# Patient Record
Sex: MALE | Race: BLACK OR AFRICAN AMERICAN | NOT HISPANIC OR LATINO | URBAN - METROPOLITAN AREA
[De-identification: names, ages, dates, MRNs, and addresses within clinical notes are randomized per-mention and may not be internally consistent; named-entity substitution may affect disease eponyms.]

---

## 2019-08-23 ENCOUNTER — EMERGENCY (EMERGENCY)
Facility: HOSPITAL | Age: 49
LOS: 1 days | Discharge: HOME | End: 2019-08-23
Attending: EMERGENCY MEDICINE
Payer: MEDICAID

## 2019-08-23 VITALS
HEART RATE: 78 BPM | OXYGEN SATURATION: 99 % | TEMPERATURE: 98 F | WEIGHT: 184.97 LBS | SYSTOLIC BLOOD PRESSURE: 119 MMHG | DIASTOLIC BLOOD PRESSURE: 84 MMHG | RESPIRATION RATE: 18 BRPM | HEIGHT: 70 IN

## 2019-08-23 PROCEDURE — 99283 EMERGENCY DEPT VISIT LOW MDM: CPT

## 2019-08-23 PROCEDURE — 99282 EMERGENCY DEPT VISIT SF MDM: CPT

## 2019-08-23 NOTE — ED STATDOCS - NS ED ROS FT
ROS: no CP/SOB. no cough. no fever. (+) nausea No v/d/c. (+) abd pain. no rash. no bleeding. no urinary complaints. no weakness. no vision changes. no HA. no neck/back pain. no extremity swelling/deformity. No change in mental status.

## 2019-08-23 NOTE — ED STATDOCS - CLINICAL SUMMARY MEDICAL DECISION MAKING FREE TEXT BOX
Pt with several weeks of abd pain, cramping, intermittent. Patient is asymptomatic now. Abdominal exam unremarkable. Tolerating PO. passing flatus, Normal bowel movement yesterday. No SHx. Will recommend Motrin, Tylenol and GI followup. Pt with several weeks of abd pain, cramping, intermittent. Patient is asymptomatic now. Abdominal exam unremarkable. Tolerating PO. passing flatus, Normal bowel movement yesterday. No SHx. no concern for acute surgical or infectious process. Will recommend Motrin, Tylenol and GI followup.

## 2019-08-23 NOTE — ED STATDOCS - PHYSICAL EXAMINATION
Gen: NAD, AOx3  Head: NCAT  HEENT: PERRL, EOMI, oral mucosa moist, normal conjunctiva, neck supple  Lung: CTAB, no respiratory distress  CV: rrr, no murmur, Normal perfusion  Abd: soft, NTND, no CVA tenderness  MSK: No edema, no visible deformities  Neuro: No focal neurologic deficits  Skin: No rash   Psych: Flat affect Gen: NAD, AOx3  Head: NCAT  HEENT: EOMI, oral mucosa moist, normal conjunctiva, neck supple  Lung: CTAB, no respiratory distress  CV: rrr, no murmur, Normal perfusion  Abd: soft, NTND  MSK: No edema, no visible deformities  Neuro: No focal neurologic deficits  Skin: No rash   Psych: Flat affect

## 2019-08-23 NOTE — ED STATDOCS - NS_ ATTENDINGSCRIBEDETAILS _ED_A_ED_FT
I, Teresita De La Cruz, performed the initial face to face bedside interview with this patient regarding history of present illness, review of symptoms and relevant past medical, social and family history.  I completed an independent physical examination.  The history, relevant review of systems, past medical and surgical history, medical decision making, and physical examination was documented by the scribe in my presence and I attest to the accuracy of the documentation.

## 2019-08-23 NOTE — ED STATDOCS - OBJECTIVE STATEMENT
49 y/o M pt with no significant PMHx presents to the ED c/o intermittent abdominal pain, onset 2 weeks ago. The pain is described as pressure. Associated sx of nausea and reported constipation. He reports that last night he had a HA along with the abdominal pain, prompting him to come to the ED. Patient last had bowel movement yesterday, and states that he had to strain to pass it. Patient has not taken any medication for his pain. Patient is planning to set up appointment to see GI. Denies CP, vomiting, diarrhea, recent travel, weight loss. No further acute complaints at this time. 47 y/o M pt with no significant PMHx presents to the ED c/o intermittent abdominal pain, onset 2 weeks ago. The pain is described as pressure. Associated sx of nausea and reported constipation. He reports that last night he had a HA along with the abdominal pain, prompting him to come to the ED. Patient last had bowel movement yesterday, and states that he had to strain to pass it. Patient has not taken any medication for his pain. +flatus. Patient is planning to set up appointment to see GI. so surgical history. no fevers. Denies CP, vomiting, diarrhea, recent travel, weight loss. No further acute complaints at this time.

## 2019-08-23 NOTE — ED ADULT TRIAGE NOTE - CHIEF COMPLAINT QUOTE
"I was seen in urgent care on Saturday for abdominal pain and nausea and the nausea won't go away"  Pt is here due to the nausea not going away. "I was seen in urgent care on Saturday for abdominal pain and nausea and the nausea won't go away"  Pt is here due to the nausea not going away.  Lower abdominal pain, + constipation, denies chest pain, denies difficulty breathing.  Was prescribed meds for nausea but finished it.

## 2022-05-31 ENCOUNTER — EMERGENCY (EMERGENCY)
Facility: HOSPITAL | Age: 52
LOS: 0 days | Discharge: HOME | End: 2022-05-31
Attending: EMERGENCY MEDICINE
Payer: MEDICAID

## 2022-05-31 VITALS — HEIGHT: 70 IN

## 2022-05-31 VITALS
RESPIRATION RATE: 18 BRPM | DIASTOLIC BLOOD PRESSURE: 84 MMHG | TEMPERATURE: 99 F | SYSTOLIC BLOOD PRESSURE: 121 MMHG | HEART RATE: 79 BPM | OXYGEN SATURATION: 98 %

## 2022-05-31 DIAGNOSIS — M79.602 PAIN IN LEFT ARM: ICD-10-CM

## 2022-05-31 DIAGNOSIS — Z85.3 PERSONAL HISTORY OF MALIGNANT NEOPLASM OF BREAST: ICD-10-CM

## 2022-05-31 DIAGNOSIS — R07.89 OTHER CHEST PAIN: ICD-10-CM

## 2022-05-31 LAB
ALBUMIN SERPL ELPH-MCNC: 3.8 G/DL — SIGNIFICANT CHANGE UP (ref 3.3–5)
ALP SERPL-CCNC: 64 U/L — SIGNIFICANT CHANGE UP (ref 40–120)
ALT FLD-CCNC: 32 U/L — SIGNIFICANT CHANGE UP (ref 12–78)
ANION GAP SERPL CALC-SCNC: 4 MMOL/L — LOW (ref 5–17)
AST SERPL-CCNC: 32 U/L — SIGNIFICANT CHANGE UP (ref 15–37)
BASOPHILS # BLD AUTO: 0.03 K/UL — SIGNIFICANT CHANGE UP (ref 0–0.2)
BASOPHILS NFR BLD AUTO: 0.7 % — SIGNIFICANT CHANGE UP (ref 0–2)
BILIRUB SERPL-MCNC: 0.5 MG/DL — SIGNIFICANT CHANGE UP (ref 0.2–1.2)
BUN SERPL-MCNC: 14 MG/DL — SIGNIFICANT CHANGE UP (ref 7–23)
CALCIUM SERPL-MCNC: 9.4 MG/DL — SIGNIFICANT CHANGE UP (ref 8.5–10.1)
CHLORIDE SERPL-SCNC: 108 MMOL/L — SIGNIFICANT CHANGE UP (ref 96–108)
CO2 SERPL-SCNC: 29 MMOL/L — SIGNIFICANT CHANGE UP (ref 22–31)
CREAT SERPL-MCNC: 1.16 MG/DL — SIGNIFICANT CHANGE UP (ref 0.5–1.3)
EGFR: 76 ML/MIN/1.73M2 — SIGNIFICANT CHANGE UP
EOSINOPHIL # BLD AUTO: 0.05 K/UL — SIGNIFICANT CHANGE UP (ref 0–0.5)
EOSINOPHIL NFR BLD AUTO: 1.2 % — SIGNIFICANT CHANGE UP (ref 0–6)
GLUCOSE SERPL-MCNC: 91 MG/DL — SIGNIFICANT CHANGE UP (ref 70–99)
HCT VFR BLD CALC: 40.3 % — SIGNIFICANT CHANGE UP (ref 39–50)
HGB BLD-MCNC: 14.3 G/DL — SIGNIFICANT CHANGE UP (ref 13–17)
IMM GRANULOCYTES NFR BLD AUTO: 0.2 % — SIGNIFICANT CHANGE UP (ref 0–1.5)
LYMPHOCYTES # BLD AUTO: 1.84 K/UL — SIGNIFICANT CHANGE UP (ref 1–3.3)
LYMPHOCYTES # BLD AUTO: 42.4 % — SIGNIFICANT CHANGE UP (ref 13–44)
MCHC RBC-ENTMCNC: 28.4 PG — SIGNIFICANT CHANGE UP (ref 27–34)
MCHC RBC-ENTMCNC: 35.5 GM/DL — SIGNIFICANT CHANGE UP (ref 32–36)
MCV RBC AUTO: 80 FL — SIGNIFICANT CHANGE UP (ref 80–100)
MONOCYTES # BLD AUTO: 0.4 K/UL — SIGNIFICANT CHANGE UP (ref 0–0.9)
MONOCYTES NFR BLD AUTO: 9.2 % — SIGNIFICANT CHANGE UP (ref 2–14)
NEUTROPHILS # BLD AUTO: 2.01 K/UL — SIGNIFICANT CHANGE UP (ref 1.8–7.4)
NEUTROPHILS NFR BLD AUTO: 46.3 % — SIGNIFICANT CHANGE UP (ref 43–77)
PLATELET # BLD AUTO: 174 K/UL — SIGNIFICANT CHANGE UP (ref 150–400)
POTASSIUM SERPL-MCNC: 4.3 MMOL/L — SIGNIFICANT CHANGE UP (ref 3.5–5.3)
POTASSIUM SERPL-SCNC: 4.3 MMOL/L — SIGNIFICANT CHANGE UP (ref 3.5–5.3)
PROT SERPL-MCNC: 7.6 GM/DL — SIGNIFICANT CHANGE UP (ref 6–8.3)
RBC # BLD: 5.04 M/UL — SIGNIFICANT CHANGE UP (ref 4.2–5.8)
RBC # FLD: 13.8 % — SIGNIFICANT CHANGE UP (ref 10.3–14.5)
SODIUM SERPL-SCNC: 141 MMOL/L — SIGNIFICANT CHANGE UP (ref 135–145)
TROPONIN I, HIGH SENSITIVITY RESULT: 6.44 NG/L — SIGNIFICANT CHANGE UP
WBC # BLD: 4.34 K/UL — SIGNIFICANT CHANGE UP (ref 3.8–10.5)
WBC # FLD AUTO: 4.34 K/UL — SIGNIFICANT CHANGE UP (ref 3.8–10.5)

## 2022-05-31 PROCEDURE — 85025 COMPLETE CBC W/AUTO DIFF WBC: CPT

## 2022-05-31 PROCEDURE — 84484 ASSAY OF TROPONIN QUANT: CPT

## 2022-05-31 PROCEDURE — 71046 X-RAY EXAM CHEST 2 VIEWS: CPT

## 2022-05-31 PROCEDURE — 99284 EMERGENCY DEPT VISIT MOD MDM: CPT

## 2022-05-31 PROCEDURE — 36415 COLL VENOUS BLD VENIPUNCTURE: CPT

## 2022-05-31 PROCEDURE — 80053 COMPREHEN METABOLIC PANEL: CPT

## 2022-05-31 PROCEDURE — 99283 EMERGENCY DEPT VISIT LOW MDM: CPT | Mod: 25

## 2022-05-31 PROCEDURE — 71046 X-RAY EXAM CHEST 2 VIEWS: CPT | Mod: 26

## 2022-05-31 NOTE — ED STATDOCS - PATIENT PORTAL LINK FT
You can access the FollowMyHealth Patient Portal offered by [facility] by registering at the following website: [email or web address]. By joining [employer]’s FollowMyHealth portal, you will also be able to view your health information using other applications (apps) compatible with our system.

## 2022-05-31 NOTE — ED ADULT TRIAGE NOTE - CHIEF COMPLAINT QUOTE
pt presents to ed ambulatory for evaluation of left arm pain and chest pain - pt reports he was being evaluated by dr bridger vergara at [place] for chemo initiation and port placement for lymphoma and ekg on 5/26 had changes but unsure of what and started on lisinopril and coreg. ekg in progress

## 2022-05-31 NOTE — ED STATDOCS - PROGRESS NOTE DETAILS
signed Paty Lima PA-C Pt seen initially in intake by Dr Lee.   51M c/o left sided chest pain since last night. pt has some soreness over his left sided chest port which was placed within the past few weeks, pt being treated for lymphoma. No swelling or apparent erythema of port area. Pt had recent wokrup with his cardiologist at [place], cannot recall their name. No significant findings on labs, imaging, or EKG. UNclear cause of pts chest pain, does not seem serious at this time, recommend close outpt f/u with his cardiologist this week. return precautions given. Pt feeling well at DC, agrees with DC and plan of care.

## 2022-05-31 NOTE — ED STATDOCS - OBJECTIVE STATEMENT
50 yo male w/PMH of lymphoma and breast cancer presents to the ED for CP and left arm pain since last night. Pt reports he was evaluated by Dr. Chris Pinto at [facility] x5 days ago for chemo initiation and port placement for lymphoma and breast cancer. States he had an echocardiogram done x5 days ago as well. Denies fever/chills, cough, SOB, abdominal pain or N/V/D. Pt is on Coreg and Lisinopril.

## 2022-05-31 NOTE — ED STATDOCS - CLINICAL SUMMARY MEDICAL DECISION MAKING FREE TEXT BOX
Pt poor historian but being worked up for lymphoma at [facility] and had a port placed last week. Saw cardiologist and had echo and started on BP medications. Some tenderness at port site. Reports CP and left arm pain x1 day. Doubt ACS. EKG normal. Will check labs including 1 troponin, if unremarkable pt will need to f/u with cardiologist this week.

## 2022-05-31 NOTE — ED ADULT NURSE NOTE - OBJECTIVE STATEMENT
c/o chest pain radiating to left arm, patient has history of breast cancer, lymophoma, and medication chest porty

## 2022-05-31 NOTE — ED STATDOCS - NSFOLLOWUPINSTRUCTIONS_ED_ALL_ED_FT
FOLLOW UP WITH YOUR CARDIOLOGIST IN 1-2 DAYS. RETURN TO THE ER FOR ANY WORSENING SYMPTOMS OR NEW CONCERNS.     Chest Pain    WHAT YOU NEED TO KNOW:    Chest pain can be caused by a range of conditions, from not serious to life-threatening. Chest pain can be a symptom of a digestive problem, such as acid reflux or a stomach ulcer. An anxiety attack or a strong emotion, such as anger, can also cause chest pain. Infection, inflammation, or a fracture in the bones or cartilage in your chest can cause pain or discomfort. Sometimes chest pain or pressure is caused by poor blood flow to your heart (angina). Chest pain may also be caused by life-threatening conditions such as a heart attack or blood clot in your lungs.     DISCHARGE INSTRUCTIONS:    Call 911 if:     You have any of the following signs of a heart attack:   Squeezing, pressure, or pain in your chest       and any of the following:   Discomfort or pain in your back, neck, jaw, stomach, or arm       Shortness of breath      Nausea or vomiting      Lightheadedness or a sudden cold sweat        Return to the emergency department if:     You have chest discomfort that gets worse, even with medicine.      You cough or vomit blood.       Your bowel movements are black or bloody.       You cannot stop vomiting, or it hurts to swallow.     Contact your healthcare provider if:     You have questions or concerns about your condition or care.        Medicines:     Medicines may be given to treat the cause of your chest pain. Examples include pain medicine, anxiety medicine, or medicines to increase blood flow to your heart.       Do not take certain medicines without asking your healthcare provider first. These include NSAIDs, herbal or vitamin supplements, or hormones (estrogen or progestin).       Take your medicine as directed. Contact your healthcare provider if you think your medicine is not helping or if you have side effects. Tell him or her if you are allergic to any medicine. Keep a list of the medicines, vitamins, and herbs you take. Include the amounts, and when and why you take them. Bring the list or the pill bottles to follow-up visits. Carry your medicine list with you in case of an emergency.    Follow up with your healthcare provider within 72 hours, or as directed: You may need to return for more tests to find the cause of your chest pain. You may be referred to a specialist, such as a cardiologist or gastroenterologist. Write down your questions so you remember to ask them during your visits.     Healthy living tips: The following are general healthy guidelines. If your chest pain is caused by a heart problem, your healthcare provider will give you specific guidelines to follow.    Do not smoke. Nicotine and other chemicals in cigarettes and cigars can cause lung and heart damage. Ask your healthcare provider for information if you currently smoke and need help to quit. E-cigarettes or smokeless tobacco still contain nicotine. Talk to your healthcare provider before you use these products.       Eat a variety of healthy, low-fat, low-salt foods. Healthy foods include fruits, vegetables, whole-grain breads, low-fat dairy products, beans, lean meats, and fish. Ask for more information about a heart healthy diet.      Drink plenty of water every day. Your body is made of mostly water. Water helps your body to control your temperature and blood pressure. Ask your healthcare provider how much water you should drink every day.      Ask about activity. Your healthcare provider will tell you which activities to limit or avoid. Ask when you can drive, return to work, and have sex. Ask about the best exercise plan for you.      Maintain a healthy weight. Ask your healthcare provider how much you should weigh. Ask him or her to help you create a weight loss plan if you are overweight.       Get the flu and pneumonia vaccines. All adults should get the influenza (flu) vaccine. Get it every year as soon as it becomes available. The pneumococcal vaccine is given to adults aged 65 years or older. The vaccine is given every 5 years to prevent pneumococcal disease, such as pneumonia.    If you have a stent:     Carry your stent card with you at all times.       Let all healthcare providers know that you have a stent.

## 2022-05-31 NOTE — ED STATDOCS - NS ED ATTENDING STATEMENT MOD
This was a shared visit with the JOYCE. I reviewed and verified the documentation and independently performed the documented:

## 2022-05-31 NOTE — ED STATDOCS - ATTENDING APP SHARED VISIT CONTRIBUTION OF CARE
I,Rustam Lee MD,  performed the initial face to face bedside interview with this patient regarding history of present illness, review of symptoms and relevant past medical, social and family history.  I completed an independent physical examination.  I was the initial provider who evaluated this patient. I have signed out the follow up of any pending tests (i.e. labs, radiological studies) to the ACP.  I have communicated the patient’s plan of care and disposition with the ACP.  The history, relevant review of systems, past medical and surgical history, medical decision making, and physical examination was documented by the scribe in my presence and I attest to the accuracy of the documentation.

## 2022-05-31 NOTE — ED ADULT NURSE NOTE - CHIEF COMPLAINT QUOTE
pt presents to ed ambulatory for evaluation of left arm pain and chest pain - pt reports he was being evaluated by dr bridger vergara at [place] for chemo initiation and port placement for lymphoma and ekg on 5/26 had changes but unsure of what and started on lisinopril and coreg. ekg in progress

## 2022-07-21 ENCOUNTER — EMERGENCY (EMERGENCY)
Facility: HOSPITAL | Age: 52
LOS: 1 days | Discharge: HOME | End: 2022-07-21
Attending: EMERGENCY MEDICINE
Payer: COMMERCIAL

## 2022-07-21 VITALS
OXYGEN SATURATION: 100 % | DIASTOLIC BLOOD PRESSURE: 69 MMHG | TEMPERATURE: 99 F | WEIGHT: 177.91 LBS | SYSTOLIC BLOOD PRESSURE: 104 MMHG | RESPIRATION RATE: 18 BRPM | HEART RATE: 76 BPM | HEIGHT: 70 IN

## 2022-07-21 LAB
ALBUMIN SERPL ELPH-MCNC: 3.8 G/DL — SIGNIFICANT CHANGE UP (ref 3.3–5.2)
ALP SERPL-CCNC: 55 U/L — SIGNIFICANT CHANGE UP (ref 40–120)
ALT FLD-CCNC: 34 U/L — SIGNIFICANT CHANGE UP
ANION GAP SERPL CALC-SCNC: 10 MMOL/L — SIGNIFICANT CHANGE UP (ref 5–17)
ANISOCYTOSIS BLD QL: SLIGHT — SIGNIFICANT CHANGE UP
AST SERPL-CCNC: 30 U/L — SIGNIFICANT CHANGE UP
BASOPHILS # BLD AUTO: 0.17 K/UL — SIGNIFICANT CHANGE UP (ref 0–0.2)
BASOPHILS NFR BLD AUTO: 1.8 % — SIGNIFICANT CHANGE UP (ref 0–2)
BILIRUB SERPL-MCNC: 0.2 MG/DL — LOW (ref 0.4–2)
BUN SERPL-MCNC: 9.9 MG/DL — SIGNIFICANT CHANGE UP (ref 8–20)
CALCIUM SERPL-MCNC: 8.8 MG/DL — SIGNIFICANT CHANGE UP (ref 8.6–10.2)
CHLORIDE SERPL-SCNC: 103 MMOL/L — SIGNIFICANT CHANGE UP (ref 98–107)
CO2 SERPL-SCNC: 26 MMOL/L — SIGNIFICANT CHANGE UP (ref 22–29)
CREAT SERPL-MCNC: 1.03 MG/DL — SIGNIFICANT CHANGE UP (ref 0.5–1.3)
EGFR: 87 ML/MIN/1.73M2 — SIGNIFICANT CHANGE UP
EOSINOPHIL # BLD AUTO: 0 K/UL — SIGNIFICANT CHANGE UP (ref 0–0.5)
EOSINOPHIL NFR BLD AUTO: 0 % — SIGNIFICANT CHANGE UP (ref 0–6)
GIANT PLATELETS BLD QL SMEAR: PRESENT — SIGNIFICANT CHANGE UP
GLUCOSE SERPL-MCNC: 93 MG/DL — SIGNIFICANT CHANGE UP (ref 70–99)
HCT VFR BLD CALC: 27.9 % — LOW (ref 39–50)
HGB BLD-MCNC: 10 G/DL — LOW (ref 13–17)
LIDOCAIN IGE QN: 30 U/L — SIGNIFICANT CHANGE UP (ref 22–51)
LYMPHOCYTES # BLD AUTO: 2.37 K/UL — SIGNIFICANT CHANGE UP (ref 1–3.3)
LYMPHOCYTES # BLD AUTO: 25.6 % — SIGNIFICANT CHANGE UP (ref 13–44)
MACROCYTES BLD QL: SLIGHT — SIGNIFICANT CHANGE UP
MANUAL SMEAR VERIFICATION: SIGNIFICANT CHANGE UP
MCHC RBC-ENTMCNC: 28.2 PG — SIGNIFICANT CHANGE UP (ref 27–34)
MCHC RBC-ENTMCNC: 35.8 GM/DL — SIGNIFICANT CHANGE UP (ref 32–36)
MCV RBC AUTO: 78.8 FL — LOW (ref 80–100)
METAMYELOCYTES # FLD: 0.9 % — HIGH (ref 0–0)
MONOCYTES # BLD AUTO: 1.31 K/UL — HIGH (ref 0–0.9)
MONOCYTES NFR BLD AUTO: 14.1 % — HIGH (ref 2–14)
MYELOCYTES NFR BLD: 0.9 % — HIGH (ref 0–0)
NEUTROPHILS # BLD AUTO: 5.08 K/UL — SIGNIFICANT CHANGE UP (ref 1.8–7.4)
NEUTROPHILS NFR BLD AUTO: 54.9 % — SIGNIFICANT CHANGE UP (ref 43–77)
OVALOCYTES BLD QL SMEAR: SLIGHT — SIGNIFICANT CHANGE UP
PLAT MORPH BLD: NORMAL — SIGNIFICANT CHANGE UP
PLATELET # BLD AUTO: 311 K/UL — SIGNIFICANT CHANGE UP (ref 150–400)
POIKILOCYTOSIS BLD QL AUTO: SLIGHT — SIGNIFICANT CHANGE UP
POLYCHROMASIA BLD QL SMEAR: SLIGHT — SIGNIFICANT CHANGE UP
POTASSIUM SERPL-MCNC: 4.1 MMOL/L — SIGNIFICANT CHANGE UP (ref 3.5–5.3)
POTASSIUM SERPL-SCNC: 4.1 MMOL/L — SIGNIFICANT CHANGE UP (ref 3.5–5.3)
PROMYELOCYTES # FLD: 1.8 % — HIGH (ref 0–0)
PROT SERPL-MCNC: 6.2 G/DL — LOW (ref 6.6–8.7)
RBC # BLD: 3.54 M/UL — LOW (ref 4.2–5.8)
RBC # FLD: 15.4 % — HIGH (ref 10.3–14.5)
RBC BLD AUTO: ABNORMAL
SMUDGE CELLS # BLD: PRESENT — SIGNIFICANT CHANGE UP
SODIUM SERPL-SCNC: 139 MMOL/L — SIGNIFICANT CHANGE UP (ref 135–145)
TARGETS BLD QL SMEAR: SLIGHT — SIGNIFICANT CHANGE UP
TROPONIN T SERPL-MCNC: <0.01 NG/ML — SIGNIFICANT CHANGE UP (ref 0–0.06)
WBC # BLD: 9.26 K/UL — SIGNIFICANT CHANGE UP (ref 3.8–10.5)
WBC # FLD AUTO: 9.26 K/UL — SIGNIFICANT CHANGE UP (ref 3.8–10.5)

## 2022-07-21 PROCEDURE — 93010 ELECTROCARDIOGRAM REPORT: CPT

## 2022-07-21 PROCEDURE — 99285 EMERGENCY DEPT VISIT HI MDM: CPT

## 2022-07-21 RX ORDER — QUETIAPINE FUMARATE 200 MG/1
200 TABLET, FILM COATED ORAL ONCE
Refills: 0 | Status: COMPLETED | OUTPATIENT
Start: 2022-07-21 | End: 2022-07-21

## 2022-07-21 RX ADMIN — QUETIAPINE FUMARATE 200 MILLIGRAM(S): 200 TABLET, FILM COATED ORAL at 21:41

## 2022-07-21 NOTE — ED PROVIDER NOTE - OBJECTIVE STATEMENT
50 yo male with hx of breast cancer and active lymphoma on chemo presents to the ED for diarrhea. Patient states that he has had diarrhea for two weeks assc with abdominal pain in the center of abdomen. Denies N/V. Last chemo treatment 11 days ago. Tolerating PO, no abdominal surgeries in the past.

## 2022-07-21 NOTE — ED PROVIDER NOTE - ATTENDING CONTRIBUTION TO CARE
I, Rustam Aguilera, performed a face to face bedside interview with this patient regarding history of present illness, review of symptoms and relevant past medical, social and family history.  I completed an independent physical examination. I have communicated the patient’s plan of care and disposition with the resident.  51 year old male with PMh lymphoma on chemo presents with 2 weeks of diarrhea. pt reports waterry, non-bloody diarrhea x 2 weeks. No fevers, vomiting, dysuria.  Gen: NAD, well appearing  CV: RRR  Pul: CTA b/l  Abd: Soft, non-distended, non-tender  Neuro: no focal deficits  Pt improved, abd soft and non-surgical, stable for dc

## 2022-07-21 NOTE — ED STATDOCS - PROGRESS NOTE DETAILS
51y male with a PMHx of breast CA, lymphoma -- chemo and radiation treatments x1/week presents to the ED c/o diarrhea, nausea, dizziness with associated abdominal pain onset x2 weeks. Pt reports telling his doctor about it when he was planned to receive treatment, but he was not given treatment for his cancer due to lower white count. Sent to [facility] for further evaluation.

## 2022-07-21 NOTE — ED STATDOCS - NS_ ATTENDINGSCRIBEDETAILS _ED_A_ED_FT
I, Meir Venegas, performed the initial face to face bedside interview with this patient regarding history of present illness, review of symptoms and relevant past medical, social and family history.  I completed an independent physical examination.   The history, relevant review of systems, past medical and surgical history, medical decision making, and physical examination was documented by the scribe in my presence and I attest to the accuracy of the documentation.

## 2022-07-21 NOTE — ED PROVIDER NOTE - CLINICAL SUMMARY MEDICAL DECISION MAKING FREE TEXT BOX
Patient with diarrhea for 2 weeks. Vitally stable. Well appearing. Complicated cancer hx on chemo. Will ct abdomen/pel, labs.

## 2022-07-21 NOTE — ED PROVIDER NOTE - PATIENT PORTAL LINK FT
You can access the FollowMyHealth Patient Portal offered by [facility] by registering at the following website: [email or web address]. By joining [employer]’s FollowMyHealth portal, you will also be able to view your health information using other applications (apps) compatible with our system.

## 2022-07-21 NOTE — ED PROVIDER NOTE - PROGRESS NOTE DETAILS
labs, CT, urine reviewed. stable on reassessment. pending stool PCR. will dc with pcp follow up. -DO Julio César

## 2022-07-21 NOTE — ED PROVIDER NOTE - NSFOLLOWUPINSTRUCTIONS_ED_ALL_ED_FT
Diarrhea    Diarrhea is frequent loose or watery bowel movements that has many causes. Diarrhea can make you feel weak and cause you to become dehydrated. Diarrhea typically lasts 2–3 days, but can last longer if it is a sign of something more serious. Drink clear fluids to prevent dehydration. Eat bland, easy-to-digest foods as tolerated.     SEEK IMMEDIATE MEDICAL CARE IF YOU HAVE ANY OF THE FOLLOWING SYMPTOMS: high fevers, lightheadedness/dizziness, chest pain, black or bloody stools, shortness of breath, severe abdominal or back pain, or any signs of dehydration.    - Please follow-up with your primary care doctor.  Please call for an appointment in the next 1-3 days but if you cannot follow-up with your primary care doctor please return to the ED for any urgent issues.  - You were given a copy of the tests performed today.  Please bring the results with you and review them with your primary care doctor.  - If you have any worsening of symptoms or any other concerns please return to the ED immediately.  - Please continue taking your home medications as directed.

## 2022-07-22 VITALS
HEART RATE: 84 BPM | DIASTOLIC BLOOD PRESSURE: 77 MMHG | RESPIRATION RATE: 20 BRPM | SYSTOLIC BLOOD PRESSURE: 123 MMHG | OXYGEN SATURATION: 99 %

## 2022-07-22 LAB
APPEARANCE UR: CLEAR — SIGNIFICANT CHANGE UP
BACTERIA # UR AUTO: ABNORMAL
BILIRUB UR-MCNC: NEGATIVE — SIGNIFICANT CHANGE UP
COLOR SPEC: YELLOW — SIGNIFICANT CHANGE UP
CULTURE RESULTS: SIGNIFICANT CHANGE UP
DIFF PNL FLD: NEGATIVE — SIGNIFICANT CHANGE UP
EPI CELLS # UR: SIGNIFICANT CHANGE UP
GLUCOSE UR QL: NEGATIVE MG/DL — SIGNIFICANT CHANGE UP
KETONES UR-MCNC: NEGATIVE — SIGNIFICANT CHANGE UP
LEUKOCYTE ESTERASE UR-ACNC: NEGATIVE — SIGNIFICANT CHANGE UP
NITRITE UR-MCNC: NEGATIVE — SIGNIFICANT CHANGE UP
PH UR: 7 — SIGNIFICANT CHANGE UP (ref 5–8)
PROT UR-MCNC: NEGATIVE — SIGNIFICANT CHANGE UP
RBC CASTS # UR COMP ASSIST: SIGNIFICANT CHANGE UP /HPF (ref 0–4)
SP GR SPEC: 1 — LOW (ref 1.01–1.02)
SPECIMEN SOURCE: SIGNIFICANT CHANGE UP
UROBILINOGEN FLD QL: NEGATIVE MG/DL — SIGNIFICANT CHANGE UP
WBC UR QL: SIGNIFICANT CHANGE UP /HPF (ref 0–5)

## 2022-07-22 PROCEDURE — 87046 STOOL CULTR AEROBIC BACT EA: CPT

## 2022-07-22 PROCEDURE — 74177 CT ABD & PELVIS W/CONTRAST: CPT | Mod: ME

## 2022-07-22 PROCEDURE — 80053 COMPREHEN METABOLIC PANEL: CPT

## 2022-07-22 PROCEDURE — 87045 FECES CULTURE AEROBIC BACT: CPT

## 2022-07-22 PROCEDURE — 84484 ASSAY OF TROPONIN QUANT: CPT

## 2022-07-22 PROCEDURE — G1004: CPT

## 2022-07-22 PROCEDURE — 36415 COLL VENOUS BLD VENIPUNCTURE: CPT

## 2022-07-22 PROCEDURE — 81001 URINALYSIS AUTO W/SCOPE: CPT

## 2022-07-22 PROCEDURE — 93005 ELECTROCARDIOGRAM TRACING: CPT

## 2022-07-22 PROCEDURE — 87507 IADNA-DNA/RNA PROBE TQ 12-25: CPT

## 2022-07-22 PROCEDURE — 83690 ASSAY OF LIPASE: CPT

## 2022-07-22 PROCEDURE — 74177 CT ABD & PELVIS W/CONTRAST: CPT | Mod: 26,ME

## 2022-07-22 PROCEDURE — 85025 COMPLETE CBC W/AUTO DIFF WBC: CPT

## 2022-07-22 PROCEDURE — 99285 EMERGENCY DEPT VISIT HI MDM: CPT | Mod: 25

## 2022-07-22 PROCEDURE — 87077 CULTURE AEROBIC IDENTIFY: CPT

## 2022-07-22 NOTE — ED ADULT NURSE REASSESSMENT NOTE - NS ED NURSE REASSESS COMMENT FT1
pt resting comfortably showing no signs of respiratory distress or pain, pt is calm and cooperative, awaiting discharge
Assumed care of pt at 19:15 as stated in report from MARYBETH Olson. Charting as noted. Patient A&O x4, denies pain/discomfort, denies CP/SOB. Updated on the plan of care. Call bell within reach, bed locked in lowest position. IV site flushed w/ NS. No redness, swelling or pain noted to site. No signs of acute distress noted, safety maintained.
pt resting comfortably showing no signs of respiratory distress or pain, pt is calm and cooperative

## 2022-07-25 LAB
CULTURE RESULTS: SIGNIFICANT CHANGE UP
SPECIMEN SOURCE: SIGNIFICANT CHANGE UP

## 2022-08-14 ENCOUNTER — EMERGENCY (EMERGENCY)
Facility: HOSPITAL | Age: 52
LOS: 1 days | Discharge: HOME | End: 2022-08-14
Attending: EMERGENCY MEDICINE
Payer: COMMERCIAL

## 2022-08-14 VITALS
RESPIRATION RATE: 17 BRPM | DIASTOLIC BLOOD PRESSURE: 67 MMHG | OXYGEN SATURATION: 98 % | SYSTOLIC BLOOD PRESSURE: 107 MMHG | HEART RATE: 94 BPM

## 2022-08-14 VITALS
OXYGEN SATURATION: 97 % | DIASTOLIC BLOOD PRESSURE: 66 MMHG | HEART RATE: 116 BPM | HEIGHT: 70 IN | TEMPERATURE: 98 F | RESPIRATION RATE: 16 BRPM | SYSTOLIC BLOOD PRESSURE: 98 MMHG | WEIGHT: 160.06 LBS

## 2022-08-14 PROCEDURE — 93010 ELECTROCARDIOGRAM REPORT: CPT

## 2022-08-14 PROCEDURE — 93005 ELECTROCARDIOGRAM TRACING: CPT

## 2022-08-14 PROCEDURE — 99284 EMERGENCY DEPT VISIT MOD MDM: CPT

## 2022-08-14 PROCEDURE — 99283 EMERGENCY DEPT VISIT LOW MDM: CPT

## 2022-08-14 NOTE — ED PROVIDER NOTE - CLINICAL SUMMARY MEDICAL DECISION MAKING FREE TEXT BOX
51YOM w/ PMH of Lymphoma , breast cancer, HTN, currently on chemotherapy tx, complaining of palpitations occurring at 12am today 8/14/22.    CXR, EKG, Troponin Levels ordered to r/o cardiopulmonary abnormalities, Pulm edema, ACS/MI  CBC/BMP ordered to r/o anemia, infection, electrolyte abnormalities pt ekg wnl, no acute complaints at this time, discussed need to be compliant with his medications.

## 2022-08-14 NOTE — ED ADULT NURSE REASSESSMENT NOTE - NS ED NURSE REASSESS COMMENT FT1
Pt dc, dc instructions provided to pt, verbalized understanding. Pt oob left amb with steady gait. VSS

## 2022-08-14 NOTE — ED PROVIDER NOTE - NS ED ROS FT
- CONSTITUTIONAL: Denies, fever or chills.    - HEENT: Denies changes in vision or hearing.    - RESPIRATORY: Denies SOB or cough.    - CV: Denies palpitations or CP.    - GI: Denies abdominal pain,. Endorses nausea & diarrhea at baseline 2/2 to chemo.    - : Denies dysuria, hematuria.    - MSK: Denies myalgia and joint pain.    - SKIN: Denies rash and pruritus.    - NEUROLOGICAL: Denies headache or dizziness

## 2022-08-14 NOTE — ED ADULT TRIAGE NOTE - CHIEF COMPLAINT QUOTE
Ambulatory to ED c/o palpitations beginning this PM. Patient states he missed one dose of daily Lisinopril and carvedilol yesterday. Patient states "I feel my heartbeat in my ear". Patient denies chest pain/discomfort or SOB at triage. EKG in progress.

## 2022-08-14 NOTE — ED ADULT NURSE NOTE - DISCHARGE DATE/TIME
09/04/21 1325   SLP Deferred Reason   SLP Deferred Reason Routine  (Consult received for dysphagia eval. Pt currently NPO for GI. Will eval when cleared for PO.)      14-Aug-2022 03:20

## 2022-08-14 NOTE — ED PROVIDER NOTE - ATTENDING CONTRIBUTION TO CARE
I personally saw the patient with the student, and completed the key components of the history and physical exam. I then discussed the management plan with the student. I personally evaluated the patient with the medical student. I completed my own independent history, physical exam, and assessment. I have independently reviewed all relevant test results. I have reviewed and agree with the notes as documented by the student.

## 2022-08-14 NOTE — ED PROVIDER NOTE - OBJECTIVE STATEMENT
51YOM w/ PMH of Lymphoma , breast cancer, HTN, currently on chemotherapy tx, complaining of palpitations occurring at 12am today 8/14/22.    Pt states he was lying in bed at midnight when he felt palpitations in his chest lasting for roughly 1 hr. Pt denies sensation of CP, SOB, Chest tightness. Pt states he has not taken his carvedilol or lisinopril medication for 2 days, and recently skipped one dose of Seroquel yesterday Pt endorses baseline nausea & diarrhea 2/2 chemo treatment. Pt denies HA, dizziness, fevers, chills, congestion, cough, dysuria, hematuria, or hematochezia.

## 2022-08-14 NOTE — ED PROVIDER NOTE - PHYSICAL EXAMINATION
- GENERAL: Alert and oriented x 3. No acute distress. Well-nourished.    - EYES: EOMI. Anicteric.    - HENT: NCAT. Moist mucous membranes. No scleral icterus. No cervical lymphadenopathy.    - LUNGS: Clear to auscultation bilaterally. No accessory muscle use.    - CARDIOVASCULAR: Regular rate and rhythm. No murmur. No JVD.    - ABDOMEN: Soft, non-tender and non-distended. No palpable masses.    - EXTREMITIES: No edema. Non-tender.?SKIN: No rashes or lesions. Chemo port site clean and intact, no signs of infection.    - NEUROLOGIC: No focal neurological deficits. CN II-XII grossly intact, but not individually tested.    - PSYCHIATRIC: Cooperative. Appropriate mood and affect.

## 2022-08-14 NOTE — ED PROVIDER NOTE - PATIENT PORTAL LINK FT
You can access the FollowMyHealth Patient Portal offered by [facility] by registering at the following website: [email or web address]. By joining [employer]’s FollowMyHealth portal, you will also be able to view your health information using other applications (apps) compatible with our system.

## 2022-08-24 NOTE — ED STATDOCS - CHPI ED QUALITY
PRESSURE Peng Advancement Flap Text: The defect edges were debeveled with a #15 scalpel blade.  Given the location of the defect, shape of the defect and the proximity to free margins a Peng advancement flap was deemed most appropriate.  Using a sterile surgical marker, an appropriate advancement flap was drawn incorporating the defect and placing the expected incisions within the relaxed skin tension lines where possible. The area thus outlined was incised deep to adipose tissue with a #15 scalpel blade.  The skin margins were undermined to an appropriate distance in all directions utilizing iris scissors.

## 2022-08-25 ENCOUNTER — EMERGENCY (EMERGENCY)
Facility: HOSPITAL | Age: 52
LOS: 1 days | Discharge: HOME | End: 2022-08-25
Attending: EMERGENCY MEDICINE
Payer: COMMERCIAL

## 2022-08-25 VITALS
OXYGEN SATURATION: 98 % | DIASTOLIC BLOOD PRESSURE: 80 MMHG | RESPIRATION RATE: 18 BRPM | TEMPERATURE: 98 F | SYSTOLIC BLOOD PRESSURE: 122 MMHG | HEART RATE: 80 BPM

## 2022-08-25 VITALS
DIASTOLIC BLOOD PRESSURE: 81 MMHG | HEART RATE: 72 BPM | WEIGHT: 184.97 LBS | SYSTOLIC BLOOD PRESSURE: 121 MMHG | RESPIRATION RATE: 20 BRPM | HEIGHT: 70 IN | OXYGEN SATURATION: 93 % | TEMPERATURE: 99 F

## 2022-08-25 PROBLEM — C50.919 MALIGNANT NEOPLASM OF UNSPECIFIED SITE OF UNSPECIFIED FEMALE BREAST: Chronic | Status: ACTIVE | Noted: 2022-08-14

## 2022-08-25 PROBLEM — C85.90 NON-HODGKIN LYMPHOMA, UNSPECIFIED, UNSPECIFIED SITE: Chronic | Status: ACTIVE | Noted: 2022-08-14

## 2022-08-25 LAB
ALBUMIN SERPL ELPH-MCNC: 3.9 G/DL — SIGNIFICANT CHANGE UP (ref 3.3–5.2)
ALP SERPL-CCNC: 68 U/L — SIGNIFICANT CHANGE UP (ref 40–120)
ALT FLD-CCNC: 20 U/L — SIGNIFICANT CHANGE UP
ANION GAP SERPL CALC-SCNC: 10 MMOL/L — SIGNIFICANT CHANGE UP (ref 5–17)
ANION GAP SERPL CALC-SCNC: 10 MMOL/L — SIGNIFICANT CHANGE UP (ref 5–17)
ANION GAP SERPL CALC-SCNC: 9 MMOL/L — SIGNIFICANT CHANGE UP (ref 5–17)
APPEARANCE UR: CLEAR — SIGNIFICANT CHANGE UP
APTT BLD: 23.7 SEC — LOW (ref 27.5–35.5)
AST SERPL-CCNC: 24 U/L — SIGNIFICANT CHANGE UP
BACTERIA # UR AUTO: ABNORMAL
BASOPHILS # BLD AUTO: 0.01 K/UL — SIGNIFICANT CHANGE UP (ref 0–0.2)
BASOPHILS NFR BLD AUTO: 0.3 % — SIGNIFICANT CHANGE UP (ref 0–2)
BILIRUB SERPL-MCNC: 1.4 MG/DL — SIGNIFICANT CHANGE UP (ref 0.4–2)
BILIRUB UR-MCNC: NEGATIVE — SIGNIFICANT CHANGE UP
BUN SERPL-MCNC: 19.1 MG/DL — SIGNIFICANT CHANGE UP (ref 8–20)
BUN SERPL-MCNC: 19.5 MG/DL — SIGNIFICANT CHANGE UP (ref 8–20)
BUN SERPL-MCNC: 20.1 MG/DL — HIGH (ref 8–20)
CALCIUM SERPL-MCNC: 8.5 MG/DL — SIGNIFICANT CHANGE UP (ref 8.4–10.5)
CALCIUM SERPL-MCNC: 8.7 MG/DL — SIGNIFICANT CHANGE UP (ref 8.4–10.5)
CALCIUM SERPL-MCNC: 8.9 MG/DL — SIGNIFICANT CHANGE UP (ref 8.4–10.5)
CHLORIDE SERPL-SCNC: 93 MMOL/L — LOW (ref 98–107)
CHLORIDE SERPL-SCNC: 96 MMOL/L — LOW (ref 98–107)
CHLORIDE SERPL-SCNC: 99 MMOL/L — SIGNIFICANT CHANGE UP (ref 98–107)
CO2 SERPL-SCNC: 23 MMOL/L — SIGNIFICANT CHANGE UP (ref 22–29)
CO2 SERPL-SCNC: 24 MMOL/L — SIGNIFICANT CHANGE UP (ref 22–29)
CO2 SERPL-SCNC: 25 MMOL/L — SIGNIFICANT CHANGE UP (ref 22–29)
COLOR SPEC: YELLOW — SIGNIFICANT CHANGE UP
CREAT ?TM UR-MCNC: 92 MG/DL — SIGNIFICANT CHANGE UP
CREAT SERPL-MCNC: 1.03 MG/DL — SIGNIFICANT CHANGE UP (ref 0.5–1.3)
CREAT SERPL-MCNC: 1.11 MG/DL — SIGNIFICANT CHANGE UP (ref 0.5–1.3)
CREAT SERPL-MCNC: 1.2 MG/DL — SIGNIFICANT CHANGE UP (ref 0.5–1.3)
D DIMER BLD IA.RAPID-MCNC: 226 NG/ML DDU — SIGNIFICANT CHANGE UP
DIFF PNL FLD: NEGATIVE — SIGNIFICANT CHANGE UP
EGFR: 73 ML/MIN/1.73M2 — SIGNIFICANT CHANGE UP
EGFR: 80 ML/MIN/1.73M2 — SIGNIFICANT CHANGE UP
EGFR: 87 ML/MIN/1.73M2 — SIGNIFICANT CHANGE UP
EOSINOPHIL # BLD AUTO: 0 K/UL — SIGNIFICANT CHANGE UP (ref 0–0.5)
EOSINOPHIL NFR BLD AUTO: 0 % — SIGNIFICANT CHANGE UP (ref 0–6)
EPI CELLS # UR: SIGNIFICANT CHANGE UP
GLUCOSE SERPL-MCNC: 88 MG/DL — SIGNIFICANT CHANGE UP (ref 70–99)
GLUCOSE SERPL-MCNC: 94 MG/DL — SIGNIFICANT CHANGE UP (ref 70–99)
GLUCOSE SERPL-MCNC: 94 MG/DL — SIGNIFICANT CHANGE UP (ref 70–99)
GLUCOSE UR QL: NEGATIVE MG/DL — SIGNIFICANT CHANGE UP
HCT VFR BLD CALC: 25.7 % — LOW (ref 39–50)
HGB BLD-MCNC: 9.3 G/DL — LOW (ref 13–17)
IMM GRANULOCYTES NFR BLD AUTO: 0.3 % — SIGNIFICANT CHANGE UP (ref 0–1.5)
INR BLD: 1.16 RATIO — SIGNIFICANT CHANGE UP (ref 0.88–1.16)
KETONES UR-MCNC: ABNORMAL
LEUKOCYTE ESTERASE UR-ACNC: NEGATIVE — SIGNIFICANT CHANGE UP
LYMPHOCYTES # BLD AUTO: 1.23 K/UL — SIGNIFICANT CHANGE UP (ref 1–3.3)
LYMPHOCYTES # BLD AUTO: 31.1 % — SIGNIFICANT CHANGE UP (ref 13–44)
MAGNESIUM SERPL-MCNC: 2.2 MG/DL — SIGNIFICANT CHANGE UP (ref 1.6–2.6)
MCHC RBC-ENTMCNC: 29.1 PG — SIGNIFICANT CHANGE UP (ref 27–34)
MCHC RBC-ENTMCNC: 36.2 GM/DL — HIGH (ref 32–36)
MCV RBC AUTO: 80.3 FL — SIGNIFICANT CHANGE UP (ref 80–100)
MONOCYTES # BLD AUTO: 0.13 K/UL — SIGNIFICANT CHANGE UP (ref 0–0.9)
MONOCYTES NFR BLD AUTO: 3.3 % — SIGNIFICANT CHANGE UP (ref 2–14)
NEUTROPHILS # BLD AUTO: 2.57 K/UL — SIGNIFICANT CHANGE UP (ref 1.8–7.4)
NEUTROPHILS NFR BLD AUTO: 65 % — SIGNIFICANT CHANGE UP (ref 43–77)
NITRITE UR-MCNC: NEGATIVE — SIGNIFICANT CHANGE UP
OSMOLALITY SERPL: 282 MOSMOL/KG — SIGNIFICANT CHANGE UP (ref 275–300)
OSMOLALITY UR: 470 MOSM/KG — SIGNIFICANT CHANGE UP (ref 300–1000)
PH UR: 8 — SIGNIFICANT CHANGE UP (ref 5–8)
PLATELET # BLD AUTO: 247 K/UL — SIGNIFICANT CHANGE UP (ref 150–400)
POTASSIUM SERPL-MCNC: 4.3 MMOL/L — SIGNIFICANT CHANGE UP (ref 3.5–5.3)
POTASSIUM SERPL-SCNC: 4.3 MMOL/L — SIGNIFICANT CHANGE UP (ref 3.5–5.3)
PROT SERPL-MCNC: 6.7 G/DL — SIGNIFICANT CHANGE UP (ref 6.6–8.7)
PROT UR-MCNC: NEGATIVE — SIGNIFICANT CHANGE UP
PROTHROM AB SERPL-ACNC: 13.5 SEC — HIGH (ref 10.5–13.4)
RBC # BLD: 3.2 M/UL — LOW (ref 4.2–5.8)
RBC # FLD: 17.2 % — HIGH (ref 10.3–14.5)
RBC CASTS # UR COMP ASSIST: SIGNIFICANT CHANGE UP /HPF (ref 0–4)
SODIUM SERPL-SCNC: 128 MMOL/L — LOW (ref 135–145)
SODIUM SERPL-SCNC: 128 MMOL/L — LOW (ref 135–145)
SODIUM SERPL-SCNC: 132 MMOL/L — LOW (ref 135–145)
SODIUM UR-SCNC: 84 MMOL/L — SIGNIFICANT CHANGE UP
SP GR SPEC: 1.01 — SIGNIFICANT CHANGE UP (ref 1.01–1.02)
TROPONIN T SERPL-MCNC: <0.01 NG/ML — SIGNIFICANT CHANGE UP (ref 0–0.06)
TROPONIN T SERPL-MCNC: <0.01 NG/ML — SIGNIFICANT CHANGE UP (ref 0–0.06)
UROBILINOGEN FLD QL: NEGATIVE MG/DL — SIGNIFICANT CHANGE UP
WBC # BLD: 3.95 K/UL — SIGNIFICANT CHANGE UP (ref 3.8–10.5)
WBC # FLD AUTO: 3.95 K/UL — SIGNIFICANT CHANGE UP (ref 3.8–10.5)
WBC UR QL: SIGNIFICANT CHANGE UP /HPF (ref 0–5)

## 2022-08-25 PROCEDURE — 84436 ASSAY OF TOTAL THYROXINE: CPT

## 2022-08-25 PROCEDURE — 93005 ELECTROCARDIOGRAM TRACING: CPT

## 2022-08-25 PROCEDURE — 83735 ASSAY OF MAGNESIUM: CPT

## 2022-08-25 PROCEDURE — 85730 THROMBOPLASTIN TIME PARTIAL: CPT

## 2022-08-25 PROCEDURE — 96361 HYDRATE IV INFUSION ADD-ON: CPT

## 2022-08-25 PROCEDURE — 71046 X-RAY EXAM CHEST 2 VIEWS: CPT | Mod: 26

## 2022-08-25 PROCEDURE — 83930 ASSAY OF BLOOD OSMOLALITY: CPT

## 2022-08-25 PROCEDURE — 81001 URINALYSIS AUTO W/SCOPE: CPT

## 2022-08-25 PROCEDURE — 80048 BASIC METABOLIC PNL TOTAL CA: CPT

## 2022-08-25 PROCEDURE — 85025 COMPLETE CBC W/AUTO DIFF WBC: CPT

## 2022-08-25 PROCEDURE — 84540 ASSAY OF URINE/UREA-N: CPT

## 2022-08-25 PROCEDURE — 80053 COMPREHEN METABOLIC PANEL: CPT

## 2022-08-25 PROCEDURE — 99285 EMERGENCY DEPT VISIT HI MDM: CPT

## 2022-08-25 PROCEDURE — 84480 ASSAY TRIIODOTHYRONINE (T3): CPT

## 2022-08-25 PROCEDURE — 85610 PROTHROMBIN TIME: CPT

## 2022-08-25 PROCEDURE — 84300 ASSAY OF URINE SODIUM: CPT

## 2022-08-25 PROCEDURE — 84443 ASSAY THYROID STIM HORMONE: CPT

## 2022-08-25 PROCEDURE — 93010 ELECTROCARDIOGRAM REPORT: CPT

## 2022-08-25 PROCEDURE — 36415 COLL VENOUS BLD VENIPUNCTURE: CPT

## 2022-08-25 PROCEDURE — 84484 ASSAY OF TROPONIN QUANT: CPT

## 2022-08-25 PROCEDURE — 96360 HYDRATION IV INFUSION INIT: CPT

## 2022-08-25 PROCEDURE — 99285 EMERGENCY DEPT VISIT HI MDM: CPT | Mod: 25

## 2022-08-25 PROCEDURE — 71046 X-RAY EXAM CHEST 2 VIEWS: CPT

## 2022-08-25 PROCEDURE — 82570 ASSAY OF URINE CREATININE: CPT

## 2022-08-25 PROCEDURE — 85379 FIBRIN DEGRADATION QUANT: CPT

## 2022-08-25 PROCEDURE — 83935 ASSAY OF URINE OSMOLALITY: CPT

## 2022-08-25 RX ORDER — ACETAMINOPHEN 500 MG
650 TABLET ORAL ONCE
Refills: 0 | Status: COMPLETED | OUTPATIENT
Start: 2022-08-25 | End: 2022-08-25

## 2022-08-25 RX ORDER — QUETIAPINE FUMARATE 200 MG/1
200 TABLET, FILM COATED ORAL ONCE
Refills: 0 | Status: COMPLETED | OUTPATIENT
Start: 2022-08-25 | End: 2022-08-25

## 2022-08-25 RX ORDER — BUPROPION HYDROCHLORIDE 150 MG/1
150 TABLET, EXTENDED RELEASE ORAL DAILY
Refills: 0 | Status: DISCONTINUED | OUTPATIENT
Start: 2022-08-25 | End: 2022-08-30

## 2022-08-25 RX ORDER — SODIUM CHLORIDE 9 MG/ML
500 INJECTION INTRAMUSCULAR; INTRAVENOUS; SUBCUTANEOUS ONCE
Refills: 0 | Status: COMPLETED | OUTPATIENT
Start: 2022-08-25 | End: 2022-08-25

## 2022-08-25 RX ORDER — SODIUM CHLORIDE 9 MG/ML
1000 INJECTION, SOLUTION INTRAVENOUS ONCE
Refills: 0 | Status: COMPLETED | OUTPATIENT
Start: 2022-08-25 | End: 2022-08-25

## 2022-08-25 RX ORDER — CARVEDILOL PHOSPHATE 80 MG/1
12.5 CAPSULE, EXTENDED RELEASE ORAL EVERY 12 HOURS
Refills: 0 | Status: DISCONTINUED | OUTPATIENT
Start: 2022-08-25 | End: 2022-08-30

## 2022-08-25 RX ADMIN — SODIUM CHLORIDE 1000 MILLILITER(S): 9 INJECTION, SOLUTION INTRAVENOUS at 18:57

## 2022-08-25 RX ADMIN — SODIUM CHLORIDE 500 MILLILITER(S): 9 INJECTION INTRAMUSCULAR; INTRAVENOUS; SUBCUTANEOUS at 17:49

## 2022-08-25 RX ADMIN — CARVEDILOL PHOSPHATE 12.5 MILLIGRAM(S): 80 CAPSULE, EXTENDED RELEASE ORAL at 20:18

## 2022-08-25 RX ADMIN — BUPROPION HYDROCHLORIDE 150 MILLIGRAM(S): 150 TABLET, EXTENDED RELEASE ORAL at 20:18

## 2022-08-25 RX ADMIN — QUETIAPINE FUMARATE 200 MILLIGRAM(S): 200 TABLET, FILM COATED ORAL at 20:18

## 2022-08-25 RX ADMIN — Medication 650 MILLIGRAM(S): at 14:31

## 2022-08-25 NOTE — ED PROVIDER NOTE - CLINICAL SUMMARY MEDICAL DECISION MAKING FREE TEXT BOX
51YOM w/ PMH of lymphoma, breast cancer, HTN & HLD presents to [facility] ED c/o acute chest pain and ongoing intermittent palpitations and SOB.     - CXR, EKG, D-Dimer, Troponin ordered to r/o ACS/MI, PE, Pneumonia, Cardiopulmonary abnormalities  - CBC, CMP ordered to r/o anemia, signs of infection and/or electrolyte abnormalities.

## 2022-08-25 NOTE — ED PROVIDER NOTE - ATTENDING CONTRIBUTION TO CARE
I have personally performed a face to face medical and diagnostic evaluation of the patient. I have discussed with and reviewed the student's note and agree with the History, ROS, Physical Exam and MDM unless otherwise indicated. A brief summary of my personal evaluation and impression can be found below.    52yo man on chemo for possible breast cancer vs lymphoma, HTN, HLD presents with chest pain, "fogginess" and SOB x few weeks with worsening symptoms over the past few days. Pt states that he has been having some diarrhea after chemo. No n/v. No lightheadedness. No fever, cough, leg swelling or pain. No abdominal pain. No dysuria.    On exam, initial vitals were reviewed.  Pt is well-appearing in no acute distress. NC/AT, clear eyes, MMM, supple neck, RRR, pulses intact in all extremities, no LE edema, clear lungs bilaterally, no hypoxia or increased work of breathing, abdomen soft, nontender, nondistended, no obvious joint deformities, pt is awake and alert and moving all extremities without difficulty, no rash noted.     Low suspicion for ACS or PE. Blood work including trop and d-dimer were sent, EKG without ischemic changes, XR. Hyponatremia noted on blood work. Given fluids and blood work was rechecked.

## 2022-08-25 NOTE — ED PROVIDER NOTE - PHYSICAL EXAMINATION
- GENERAL: Alert and oriented x 3. No acute distress. Well-nourished.    - EYES: EOMI. Anicteric.    - HENT: NCAT. Moist mucous membranes. No scleral icterus. No cervical lymphadenopathy.    - LUNGS: Clear to auscultation bilaterally. No accessory muscle use.    - CARDIOVASCULAR: Regular rate and rhythm. No murmur. No JVD. Tender to palpation of the LUQ near port site.     - ABDOMEN: Soft, non-tender and non-distended. No palpable masses.    - EXTREMITIES: No edema. Non-tender.?SKIN: No rashes or lesions. Warm. Port site appears intact w/ no erythema, swelling or warmth.     - NEUROLOGIC: No focal neurological deficits. CN II-XII grossly intact, but not individually tested.    - PSYCHIATRIC: Cooperative. Appropriate mood and affect.

## 2022-08-25 NOTE — ED PROVIDER NOTE - NS ED ROS FT
- CONSTITUTIONAL: Denies fever or chills.    - HEENT: Denies changes in vision or hearing.    - RESPIRATORY: Endorses SOB for several weeks.     - CV: Endorses CP, palpitations.     - GI: Denies new abdominal pain, nausea or vomiting. Endorses diarrhea for several weeks.     - : Denies dysuria or hematuria.    - MSK: Denies new myalgias or joint pains.    - NEUROLOGICAL: Pt reports intermittent LH & headaches at baseline.

## 2022-08-25 NOTE — ED PROVIDER NOTE - OBJECTIVE STATEMENT
51YOM w/ PMH of lymphoma, breast cancer, HTN & HLD presents to [facility] ED c/o chest pain. Pt states starting this AM he began having intermittent chest pain in the left upper chest. Pt states this morning in early AM began noticing intermittent c/p in the left upper quadrant of the chest. Pt states pain was non radiating. In addition pt states he has had some intermittent palpitations and SOB today, but this has been ongoing for several weeks and has not worsened/changed. Pt endorses diarrhea for several weeks most likely 2/2 to chemo. Additionally pt reports increasing confusion over past several weeks/months. Pt denies fevers, chills, n/v, dysuria, hematuria. 51YOM w/ PMH of lymphoma, breast cancer, HTN & HLD presents to [facility] ED c/o chest pain.     Pt states starting this AM he began having intermittent chest pain in the left upper chest. Pt states this morning in early AM began noticing intermittent c/p in the left upper quadrant of the chest. Pt states pain was non radiating. In addition pt states he has had some intermittent palpitations and SOB today, but this has been ongoing for several weeks and has not worsened/changed. Pt endorses diarrhea for several weeks most likely 2/2 to chemo. Pt also reports sensation of increased confusion over past several weeks/months. Pt denies fevers, chills, n/v, dysuria, hematuria.

## 2022-08-25 NOTE — ED ADULT NURSE NOTE - OBJECTIVE STATEMENT
Assumed care of patient in a16L. Pt a&ox4 rr even and unlabored with pmhx of lymphona, breast ca, htn currently in chemotherapy (last 8/15 as per pt) presents to ed with intermittent chest pain, denies chest pain upon RN assessment. Pt denies headache, changes in vision, fevers, chills, cough, gu/gi symptoms. pt educated on plan of care, pt able to successfully teach back plan of care to RN, RN will continue to reeducate pt during hospital stay.

## 2022-08-25 NOTE — ED PROVIDER NOTE - PATIENT PORTAL LINK FT
You can access the FollowMyHealth Patient Portal offered by [facility] by registering at the following website: [email or web address]. By joining [facility]’s FollowMyHealth portal, you will also be able to view your health information using other applications (apps) compatible with our system.

## 2022-08-26 LAB — UUN UR-MCNC: 695 MG/DL — SIGNIFICANT CHANGE UP

## 2022-09-02 ENCOUNTER — INPATIENT (INPATIENT)
Facility: HOSPITAL | Age: 52
LOS: 2 days | Discharge: HOME | DRG: 641 | End: 2022-09-05
Attending: HOSPITALIST | Admitting: STUDENT IN AN ORGANIZED HEALTH CARE EDUCATION/TRAINING PROGRAM
Payer: COMMERCIAL

## 2022-09-02 VITALS
SYSTOLIC BLOOD PRESSURE: 128 MMHG | WEIGHT: 199.96 LBS | HEART RATE: 91 BPM | RESPIRATION RATE: 20 BRPM | DIASTOLIC BLOOD PRESSURE: 83 MMHG | TEMPERATURE: 98 F | HEIGHT: 70 IN | OXYGEN SATURATION: 99 %

## 2022-09-02 DIAGNOSIS — R41.82 ALTERED MENTAL STATUS, UNSPECIFIED: ICD-10-CM

## 2022-09-02 LAB
ALBUMIN SERPL ELPH-MCNC: 3.5 G/DL — SIGNIFICANT CHANGE UP (ref 3.3–5.2)
ALP SERPL-CCNC: 58 U/L — SIGNIFICANT CHANGE UP (ref 40–120)
ALT FLD-CCNC: 29 U/L — SIGNIFICANT CHANGE UP
AMPHET UR-MCNC: NEGATIVE — SIGNIFICANT CHANGE UP
ANION GAP SERPL CALC-SCNC: 16 MMOL/L — SIGNIFICANT CHANGE UP (ref 5–17)
ANION GAP SERPL CALC-SCNC: 18 MMOL/L — HIGH (ref 5–17)
ANISOCYTOSIS BLD QL: SLIGHT — SIGNIFICANT CHANGE UP
APPEARANCE UR: CLEAR — SIGNIFICANT CHANGE UP
APTT BLD: 28.1 SEC — SIGNIFICANT CHANGE UP (ref 27.5–35.5)
AST SERPL-CCNC: 32 U/L — SIGNIFICANT CHANGE UP
BARBITURATES UR SCN-MCNC: NEGATIVE — SIGNIFICANT CHANGE UP
BASOPHILS # BLD AUTO: 0 K/UL — SIGNIFICANT CHANGE UP (ref 0–0.2)
BASOPHILS NFR BLD AUTO: 0 % — SIGNIFICANT CHANGE UP (ref 0–2)
BENZODIAZ UR-MCNC: NEGATIVE — SIGNIFICANT CHANGE UP
BILIRUB SERPL-MCNC: 0.5 MG/DL — SIGNIFICANT CHANGE UP (ref 0.4–2)
BILIRUB UR-MCNC: NEGATIVE — SIGNIFICANT CHANGE UP
BUN SERPL-MCNC: 7.9 MG/DL — LOW (ref 8–20)
BUN SERPL-MCNC: 8.1 MG/DL — SIGNIFICANT CHANGE UP (ref 8–20)
CALCIUM SERPL-MCNC: 7.7 MG/DL — LOW (ref 8.4–10.5)
CALCIUM SERPL-MCNC: 8.8 MG/DL — SIGNIFICANT CHANGE UP (ref 8.4–10.5)
CHLORIDE SERPL-SCNC: 100 MMOL/L — SIGNIFICANT CHANGE UP (ref 98–107)
CHLORIDE SERPL-SCNC: 107 MMOL/L — SIGNIFICANT CHANGE UP (ref 98–107)
CO2 SERPL-SCNC: 17 MMOL/L — LOW (ref 22–29)
CO2 SERPL-SCNC: 20 MMOL/L — LOW (ref 22–29)
COCAINE METAB.OTHER UR-MCNC: NEGATIVE — SIGNIFICANT CHANGE UP
COLOR SPEC: YELLOW — SIGNIFICANT CHANGE UP
CREAT SERPL-MCNC: 0.97 MG/DL — SIGNIFICANT CHANGE UP (ref 0.5–1.3)
CREAT SERPL-MCNC: 0.97 MG/DL — SIGNIFICANT CHANGE UP (ref 0.5–1.3)
DIFF PNL FLD: NEGATIVE — SIGNIFICANT CHANGE UP
EGFR: 94 ML/MIN/1.73M2 — SIGNIFICANT CHANGE UP
EGFR: 94 ML/MIN/1.73M2 — SIGNIFICANT CHANGE UP
EOSINOPHIL # BLD AUTO: 0 K/UL — SIGNIFICANT CHANGE UP (ref 0–0.5)
EOSINOPHIL NFR BLD AUTO: 0 % — SIGNIFICANT CHANGE UP (ref 0–6)
FLUAV AG NPH QL: SIGNIFICANT CHANGE UP
FLUBV AG NPH QL: SIGNIFICANT CHANGE UP
GLUCOSE SERPL-MCNC: 81 MG/DL — SIGNIFICANT CHANGE UP (ref 70–99)
GLUCOSE SERPL-MCNC: 87 MG/DL — SIGNIFICANT CHANGE UP (ref 70–99)
GLUCOSE UR QL: NEGATIVE MG/DL — SIGNIFICANT CHANGE UP
HCT VFR BLD CALC: 22.2 % — LOW (ref 39–50)
HGB BLD-MCNC: 8.2 G/DL — LOW (ref 13–17)
INR BLD: 1.27 RATIO — HIGH (ref 0.88–1.16)
KETONES UR-MCNC: ABNORMAL
LACTATE BLDV-MCNC: 1.1 MMOL/L — SIGNIFICANT CHANGE UP (ref 0.5–2)
LEUKOCYTE ESTERASE UR-ACNC: NEGATIVE — SIGNIFICANT CHANGE UP
LYMPHOCYTES # BLD AUTO: 1.11 K/UL — SIGNIFICANT CHANGE UP (ref 1–3.3)
LYMPHOCYTES # BLD AUTO: 24.2 % — SIGNIFICANT CHANGE UP (ref 13–44)
MACROCYTES BLD QL: SLIGHT — SIGNIFICANT CHANGE UP
MANUAL SMEAR VERIFICATION: SIGNIFICANT CHANGE UP
MCHC RBC-ENTMCNC: 30 PG — SIGNIFICANT CHANGE UP (ref 27–34)
MCHC RBC-ENTMCNC: 36.9 GM/DL — HIGH (ref 32–36)
MCV RBC AUTO: 81.3 FL — SIGNIFICANT CHANGE UP (ref 80–100)
METAMYELOCYTES # FLD: 4 % — HIGH (ref 0–0)
METHADONE UR-MCNC: NEGATIVE — SIGNIFICANT CHANGE UP
MONOCYTES # BLD AUTO: 0.56 K/UL — SIGNIFICANT CHANGE UP (ref 0–0.9)
MONOCYTES NFR BLD AUTO: 12.1 % — SIGNIFICANT CHANGE UP (ref 2–14)
MYELOCYTES NFR BLD: 0.8 % — HIGH (ref 0–0)
NEUTROPHILS # BLD AUTO: 2.41 K/UL — SIGNIFICANT CHANGE UP (ref 1.8–7.4)
NEUTROPHILS NFR BLD AUTO: 51.6 % — SIGNIFICANT CHANGE UP (ref 43–77)
NEUTS BAND # BLD: 0.8 % — SIGNIFICANT CHANGE UP (ref 0–8)
NITRITE UR-MCNC: NEGATIVE — SIGNIFICANT CHANGE UP
OPIATES UR-MCNC: NEGATIVE — SIGNIFICANT CHANGE UP
OVALOCYTES BLD QL SMEAR: SLIGHT — SIGNIFICANT CHANGE UP
PCP SPEC-MCNC: SIGNIFICANT CHANGE UP
PCP UR-MCNC: NEGATIVE — SIGNIFICANT CHANGE UP
PH UR: 6.5 — SIGNIFICANT CHANGE UP (ref 5–8)
PLAT MORPH BLD: NORMAL — SIGNIFICANT CHANGE UP
PLATELET # BLD AUTO: 397 K/UL — SIGNIFICANT CHANGE UP (ref 150–400)
POLYCHROMASIA BLD QL SMEAR: SLIGHT — SIGNIFICANT CHANGE UP
POTASSIUM SERPL-MCNC: 2.6 MMOL/L — CRITICAL LOW (ref 3.5–5.3)
POTASSIUM SERPL-MCNC: 3.2 MMOL/L — LOW (ref 3.5–5.3)
POTASSIUM SERPL-SCNC: 2.6 MMOL/L — CRITICAL LOW (ref 3.5–5.3)
POTASSIUM SERPL-SCNC: 3.2 MMOL/L — LOW (ref 3.5–5.3)
PROMYELOCYTES # FLD: 1.6 % — HIGH (ref 0–0)
PROT SERPL-MCNC: 6.2 G/DL — LOW (ref 6.6–8.7)
PROT UR-MCNC: NEGATIVE — SIGNIFICANT CHANGE UP
PROTHROM AB SERPL-ACNC: 14.8 SEC — HIGH (ref 10.5–13.4)
RBC # BLD: 2.73 M/UL — LOW (ref 4.2–5.8)
RBC # FLD: 17.4 % — HIGH (ref 10.3–14.5)
RBC BLD AUTO: ABNORMAL
RSV RNA NPH QL NAA+NON-PROBE: SIGNIFICANT CHANGE UP
SARS-COV-2 RNA SPEC QL NAA+PROBE: SIGNIFICANT CHANGE UP
SMUDGE CELLS # BLD: PRESENT — SIGNIFICANT CHANGE UP
SODIUM SERPL-SCNC: 137 MMOL/L — SIGNIFICANT CHANGE UP (ref 135–145)
SODIUM SERPL-SCNC: 140 MMOL/L — SIGNIFICANT CHANGE UP (ref 135–145)
SP GR SPEC: 1.01 — SIGNIFICANT CHANGE UP (ref 1.01–1.02)
TARGETS BLD QL SMEAR: SIGNIFICANT CHANGE UP
THC UR QL: NEGATIVE — SIGNIFICANT CHANGE UP
UROBILINOGEN FLD QL: NEGATIVE MG/DL — SIGNIFICANT CHANGE UP
VARIANT LYMPHS # BLD: 4.9 % — SIGNIFICANT CHANGE UP (ref 0–6)
WBC # BLD: 4.6 K/UL — SIGNIFICANT CHANGE UP (ref 3.8–10.5)
WBC # FLD AUTO: 4.6 K/UL — SIGNIFICANT CHANGE UP (ref 3.8–10.5)

## 2022-09-02 PROCEDURE — 99223 1ST HOSP IP/OBS HIGH 75: CPT

## 2022-09-02 PROCEDURE — 99285 EMERGENCY DEPT VISIT HI MDM: CPT

## 2022-09-02 PROCEDURE — 70450 CT HEAD/BRAIN W/O DYE: CPT | Mod: 26,MG

## 2022-09-02 PROCEDURE — G1004: CPT

## 2022-09-02 PROCEDURE — 71045 X-RAY EXAM CHEST 1 VIEW: CPT | Mod: 26

## 2022-09-02 RX ORDER — QUETIAPINE FUMARATE 200 MG/1
200 TABLET, FILM COATED ORAL
Refills: 0 | Status: DISCONTINUED | OUTPATIENT
Start: 2022-09-02 | End: 2022-09-05

## 2022-09-02 RX ORDER — ONDANSETRON 8 MG/1
8 TABLET, FILM COATED ORAL EVERY 8 HOURS
Refills: 0 | Status: DISCONTINUED | OUTPATIENT
Start: 2022-09-02 | End: 2022-09-05

## 2022-09-02 RX ORDER — SODIUM CHLORIDE 9 MG/ML
1000 INJECTION INTRAMUSCULAR; INTRAVENOUS; SUBCUTANEOUS
Refills: 0 | Status: DISCONTINUED | OUTPATIENT
Start: 2022-09-02 | End: 2022-09-04

## 2022-09-02 RX ORDER — ENOXAPARIN SODIUM 100 MG/ML
40 INJECTION SUBCUTANEOUS EVERY 24 HOURS
Refills: 0 | Status: DISCONTINUED | OUTPATIENT
Start: 2022-09-02 | End: 2022-09-05

## 2022-09-02 RX ORDER — SODIUM CHLORIDE 9 MG/ML
2300 INJECTION INTRAMUSCULAR; INTRAVENOUS; SUBCUTANEOUS ONCE
Refills: 0 | Status: COMPLETED | OUTPATIENT
Start: 2022-09-02 | End: 2022-09-02

## 2022-09-02 RX ORDER — ACETAMINOPHEN 500 MG
650 TABLET ORAL EVERY 6 HOURS
Refills: 0 | Status: DISCONTINUED | OUTPATIENT
Start: 2022-09-02 | End: 2022-09-05

## 2022-09-02 RX ORDER — PANTOPRAZOLE SODIUM 20 MG/1
40 TABLET, DELAYED RELEASE ORAL
Refills: 0 | Status: DISCONTINUED | OUTPATIENT
Start: 2022-09-02 | End: 2022-09-05

## 2022-09-02 RX ORDER — LISINOPRIL 2.5 MG/1
10 TABLET ORAL DAILY
Refills: 0 | Status: DISCONTINUED | OUTPATIENT
Start: 2022-09-02 | End: 2022-09-05

## 2022-09-02 RX ORDER — QUETIAPINE FUMARATE 200 MG/1
200 TABLET, FILM COATED ORAL
Refills: 0 | Status: DISCONTINUED | OUTPATIENT
Start: 2022-09-02 | End: 2022-09-02

## 2022-09-02 RX ORDER — BUPROPION HYDROCHLORIDE 150 MG/1
1 TABLET, EXTENDED RELEASE ORAL
Qty: 0 | Refills: 0 | DISCHARGE

## 2022-09-02 RX ORDER — ONDANSETRON 8 MG/1
8 TABLET, FILM COATED ORAL THREE TIMES A DAY
Refills: 0 | Status: DISCONTINUED | OUTPATIENT
Start: 2022-09-02 | End: 2022-09-02

## 2022-09-02 RX ORDER — QUETIAPINE FUMARATE 200 MG/1
1 TABLET, FILM COATED ORAL
Qty: 0 | Refills: 0 | DISCHARGE

## 2022-09-02 RX ORDER — BUPROPION HYDROCHLORIDE 150 MG/1
200 TABLET, EXTENDED RELEASE ORAL DAILY
Refills: 0 | Status: DISCONTINUED | OUTPATIENT
Start: 2022-09-02 | End: 2022-09-02

## 2022-09-02 RX ORDER — POTASSIUM CHLORIDE 20 MEQ
40 PACKET (EA) ORAL EVERY 4 HOURS
Refills: 0 | Status: COMPLETED | OUTPATIENT
Start: 2022-09-02 | End: 2022-09-03

## 2022-09-02 RX ORDER — BUPROPION HYDROCHLORIDE 150 MG/1
200 TABLET, EXTENDED RELEASE ORAL DAILY
Refills: 0 | Status: DISCONTINUED | OUTPATIENT
Start: 2022-09-02 | End: 2022-09-05

## 2022-09-02 RX ORDER — LANOLIN ALCOHOL/MO/W.PET/CERES
3 CREAM (GRAM) TOPICAL AT BEDTIME
Refills: 0 | Status: DISCONTINUED | OUTPATIENT
Start: 2022-09-02 | End: 2022-09-05

## 2022-09-02 RX ORDER — POTASSIUM CHLORIDE 20 MEQ
10 PACKET (EA) ORAL
Refills: 0 | Status: COMPLETED | OUTPATIENT
Start: 2022-09-02 | End: 2022-09-02

## 2022-09-02 RX ADMIN — SODIUM CHLORIDE 2300 MILLILITER(S): 9 INJECTION INTRAMUSCULAR; INTRAVENOUS; SUBCUTANEOUS at 14:53

## 2022-09-02 RX ADMIN — Medication 100 MILLIEQUIVALENT(S): at 17:11

## 2022-09-02 RX ADMIN — Medication 100 MILLIEQUIVALENT(S): at 16:01

## 2022-09-02 RX ADMIN — ENOXAPARIN SODIUM 40 MILLIGRAM(S): 100 INJECTION SUBCUTANEOUS at 21:11

## 2022-09-02 RX ADMIN — Medication 100 MILLIEQUIVALENT(S): at 18:25

## 2022-09-02 RX ADMIN — SODIUM CHLORIDE 100 MILLILITER(S): 9 INJECTION INTRAMUSCULAR; INTRAVENOUS; SUBCUTANEOUS at 21:10

## 2022-09-02 RX ADMIN — Medication 650 MILLIGRAM(S): at 22:31

## 2022-09-02 RX ADMIN — Medication 40 MILLIEQUIVALENT(S): at 21:11

## 2022-09-02 RX ADMIN — Medication 650 MILLIGRAM(S): at 21:31

## 2022-09-02 NOTE — ED ADULT NURSE NOTE - CHIEF COMPLAINT QUOTE
BIBA from home - as per EMS pt currently getting chemo for breast CA and suddenly  around 8 am stopped speaking . EMS stattes pt  was seen for the same in [place] for the same recenltly and was told symptoms were related to dehydration. Pt very slow in responding - not providing any info- fallows commends

## 2022-09-02 NOTE — ED PROVIDER NOTE - PROGRESS NOTE DETAILS
Aimee: pt wife arrived states pt has been like this intermittently ove rpast few weeks. she has seen him take his psych meds. non focal. not speaking/cooperative. possible psych component but given lymphoma likely needs mri/further neuro w/u prior to psych assessment. admitted to medicine.

## 2022-09-02 NOTE — H&P ADULT - NSHPPHYSICALEXAM_GEN_ALL_CORE
Vital Signs Last 24 Hrs  T(F): 98 (02 Sep 2022 13:04), Max: 98 (02 Sep 2022 13:04)  HR: 91 (02 Sep 2022 13:04) (91 - 91)  BP: 128/83 (02 Sep 2022 13:04) (128/83 - 128/83)  RR: 20 (02 Sep 2022 13:04) (20 - 20)  SpO2: 99% (02 Sep 2022 13:04) (99% - 99%)    Physical Exam:  Constitutional: Middle aged male, non-cooperative, inconsistently answering questions with blank stare  HEENT: NC/AT  Respiratory: CTA bilaterally, symmetrical chest rise  Cardiovascular: RRR, no m/g/r  Gastrointestinal: Soft, NT/ND, BS+  Vascular: 2+ peripheral pulses  Neurological: Awake, non-cooperative with exam. Moving extremities.   Psych: Blank stare  Musculoskeletal: No edema, cyanosis, deformities. ROM normal  Skin: No obvious rash, lesions. No jaundice.

## 2022-09-02 NOTE — ED PROVIDER NOTE - OBJECTIVE STATEMENT
50 y/o male hx breast cancer/lymphoma on chemo at [place], major depression/psych on seroquel/fluphenazine p/w intermittent change in mental status over past 2 weeks. Pt not providing much history, wife reports pt has had episodes where doesn't eat drink/refuses to take meds, doesn't talk. Last one was sunday-monday, went to [facility] for 24 hours was told dehydrdation and resolved. Similar to today. States started a little bit last night but worse thing morning. pt follows some commands and occsasionally will speak in sentences but mostly staring ahead and not answring questions.     limited ros by historian.

## 2022-09-02 NOTE — ED PROVIDER NOTE - PHYSICAL EXAMINATION
Gen: No acute distress, non toxic  HEENT:  pink conjunctivae, EOMI  CV: RRR, nl s1/s2.  Resp: CTAB, normal rate and effort  GI: Abdomen soft, NT, ND. No rebound, no guarding  : No CVAT  Neuro: moves all extremities, non focal. speaks softly in full sentences but doesn't answer questions directly.   MSK: No spine or joint tenderness to palpation  Skin: No rashes. intact and perfused.

## 2022-09-02 NOTE — ED ADULT NURSE NOTE - OBJECTIVE STATEMENT
Patient presented to ED complaining of weakness, and change in mental status x2weeks. PMHX of Breast CA on chemo with mediport. patient being treated at [facility]. Patient has had episodes of decrease eating and drinking. Refuses to Take meds. Patient quet refusing to anser questions at time. Wife at bedside. No distress noted at this time.

## 2022-09-02 NOTE — ED PROVIDER NOTE - CLINICAL SUMMARY MEDICAL DECISION MAKING FREE TEXT BOX
intermittent episodes of not eating/speaking/cooperating. non focal neuro though not speaking/cooperating but pt did state "I had an iv in this arm" pointing to right arm and was articulate when speaking. will check labs, treat hydration. afebrile and no localizing symptoms, possible psych component, will reassess.

## 2022-09-02 NOTE — ED ADULT NURSE NOTE - NSIMPLEMENTINTERV_GEN_ALL_ED
Implemented All Fall Risk Interventions:  [place] to call system. Call bell, personal items and telephone within reach. Instruct patient to call for assistance. Room bathroom lighting operational. Non-slip footwear when patient is off stretcher. Physically safe environment: no spills, clutter or unnecessary equipment. Stretcher in lowest position, wheels locked, appropriate side rails in place. Provide visual cue, wrist band, yellow gown, etc. Monitor gait and stability. Monitor for mental status changes and reorient to person, place, and time. Review medications for side effects contributing to fall risk. Reinforce activity limits and safety measures with patient and family.

## 2022-09-02 NOTE — H&P ADULT - ASSESSMENT
Hypokalemia, AGMA  - In setting of chemo/dehydration  - K: 2.6, s/p KCl IV x 3, will order additional 40mEq x 2  - S/p 2.3 L bolus, start maintenace IVFs.  - Monitor BMP, correct electrolytes as needed    AMS  - Possibly due to underlying Psychiatric condition vs neurological event  - CTH negative  - Neurology consult  - Psych consult  - Fall precautions  - U/a, Urine toxicology,  - Check Ammonia, ETOH, TSH    HTN  - Continue Lisinopril    GERD  - Continue PPI    Schizophrenia, Depression  - Per med rec, patient is on Bupropion, Seroquel and Fluphenazine.  - Psych consulted    Hx of R breast CA/Lymphoma  - Follows with Dr. Pinto at [facility]    DVT ppx: Lovenox Patient is a 50 yo M w/ PMH of Breast CA, Lymphoma (follows at [facility] w/ Dr. Pinto), Major Depression/Psych Disorder, HTN, GERD presenting with chief complaint of AMS.    Hypokalemia, AGMA  - In setting of chemo/dehydration  - K: 2.6, s/p KCl IV x 3, will order additional 40mEq x 2  - S/p 2.3 L bolus, start maintenance IVFs.  - Monitor BMP, correct electrolytes as needed    AMS  - Possibly due to underlying Psychiatric condition vs neurological event  - CTH negative  - Neurology consulted for AM  - Psych consulted  - Fall precautions  - U/a, Urine toxicology,  - Check Ammonia, ETOH, TSH    HTN  - Continue Lisinopril    GERD  - Continue PPI    Schizophrenia, Depression  - Per med rec, patient is on Bupropion, Seroquel and Fluphenazine.  - Psych consulted    Hx of R breast CA/Lymphoma  - Follows with Dr. Pinto at [facility]    DVT ppx: Lovenox Patient is a 52 yo M w/ PMH of Breast CA, Lymphoma (follows at [facility] w/ Dr. Pinto), Major Depression/Psych Disorder, HTN, GERD presenting with chief complaint of AMS.    Hypokalemia, AGMA  - In setting of chemo/dehydration  - K: 2.6, s/p KCl IV x 3, will order additional 40mEq x 2  - S/p 2.3 L bolus, start maintenance IVFs.  - Monitor BMP, correct electrolytes as needed    AMS  - Possibly due to underlying Psychiatric condition vs neurological event  - CTH negative  - Neurology consulted for AM  - Psych consulted  - Fall precautions  - U/a, Urine toxicology, Ammonia, ETOH, TSH    Hx of Depression and underlying Psych Disorder  - Per wife, patient just has history of Depression however med rec lists Bupropion, Seroquel, and Fluphenazine IM.  - Psych consulted    Anemia  - Anemia panel ordered  - Monitor     HTN  - Continue Lisinopril    GERD  - Continue PPI    Hx of R breast CA/Lymphoma  - Follows with Dr. Pinto at [facility]  - Last chemo last week per patient    DVT ppx: Lovenox

## 2022-09-02 NOTE — H&P ADULT - HISTORY OF PRESENT ILLNESS
Patient is a 52 yo M w/ PMH of Breast CA, Lymphoma (follows at [facility] w/ Dr. Pinto), Major Depression/Psych Disorder, HTN, GERD presenting with chief complaint of AMS. Originally on assessment of patient, patient opened eyes and stared blankly at myself. He mumbled some words and asked to see my badge, then refused to cooperate with questions or exam. Shortly after, his wife Court arrived to bedside and patient became somewhat more cooperative with short inconsistent answers. Per wife, patient initially became "out of it" last Thursday. He had a blank stare and refused to eat or drink. He was in the [facility] ED on Saturday and was told he had a "viral infection" and was discharged. He returned to his baseline however similar symptoms occured this morning. She attempted to take him to [facility] where he normally follows but he refused to get in the car, therefore she called EMS. He has a history of Breast CA and Lymphoma and receives chemotherapy every week, last received last week.

## 2022-09-02 NOTE — ED ADULT TRIAGE NOTE - CHIEF COMPLAINT QUOTE
BIBA from home - as per EMS pt currently getting chemo for breast CA and suddenly  around 8 am stopped speaking . EMS stattes pt  was seen for the same in [place] for the same recenltly and was told symptoms were related to dehydration. Pt very slow in responding - not providing any info- fallows commends

## 2022-09-02 NOTE — ED ADULT NURSE NOTE - PRO INTERPRETER NEED 2
Patient completed Session 17 of Phase II Monitored Cardiac Rehabilitation. Please see Media Tab for scanned in Exercise Session Report.     English

## 2022-09-03 LAB
ALBUMIN SERPL ELPH-MCNC: 3.4 G/DL — SIGNIFICANT CHANGE UP (ref 3.3–5.2)
ALP SERPL-CCNC: 58 U/L — SIGNIFICANT CHANGE UP (ref 40–120)
ALT FLD-CCNC: 31 U/L — SIGNIFICANT CHANGE UP
AMMONIA BLD-MCNC: 84 UMOL/L — HIGH (ref 11–55)
ANION GAP SERPL CALC-SCNC: 17 MMOL/L — SIGNIFICANT CHANGE UP (ref 5–17)
ANISOCYTOSIS BLD QL: SLIGHT — SIGNIFICANT CHANGE UP
AST SERPL-CCNC: 37 U/L — SIGNIFICANT CHANGE UP
BASE EXCESS BLDA CALC-SCNC: -3 MMOL/L — LOW (ref -2–3)
BASOPHILS # BLD AUTO: 0.04 K/UL — SIGNIFICANT CHANGE UP (ref 0–0.2)
BASOPHILS NFR BLD AUTO: 0.9 % — SIGNIFICANT CHANGE UP (ref 0–2)
BILIRUB SERPL-MCNC: 0.4 MG/DL — SIGNIFICANT CHANGE UP (ref 0.4–2)
BLOOD GAS COMMENTS ARTERIAL: SIGNIFICANT CHANGE UP
BUN SERPL-MCNC: 9.5 MG/DL — SIGNIFICANT CHANGE UP (ref 8–20)
CALCIUM SERPL-MCNC: 8.3 MG/DL — LOW (ref 8.4–10.5)
CHLORIDE SERPL-SCNC: 105 MMOL/L — SIGNIFICANT CHANGE UP (ref 98–107)
CO2 SERPL-SCNC: 19 MMOL/L — LOW (ref 22–29)
CREAT SERPL-MCNC: 0.97 MG/DL — SIGNIFICANT CHANGE UP (ref 0.5–1.3)
CULTURE RESULTS: NO GROWTH — SIGNIFICANT CHANGE UP
EGFR: 94 ML/MIN/1.73M2 — SIGNIFICANT CHANGE UP
EOSINOPHIL # BLD AUTO: 0 K/UL — SIGNIFICANT CHANGE UP (ref 0–0.5)
EOSINOPHIL NFR BLD AUTO: 0 % — SIGNIFICANT CHANGE UP (ref 0–6)
ETHANOL SERPL-MCNC: <10 MG/DL — SIGNIFICANT CHANGE UP (ref 0–9)
FERRITIN SERPL-MCNC: 474 NG/ML — HIGH (ref 30–400)
FOLATE SERPL-MCNC: 16.7 NG/ML — SIGNIFICANT CHANGE UP
GAS PNL BLDA: SIGNIFICANT CHANGE UP
GIANT PLATELETS BLD QL SMEAR: PRESENT — SIGNIFICANT CHANGE UP
GLUCOSE SERPL-MCNC: 87 MG/DL — SIGNIFICANT CHANGE UP (ref 70–99)
HCO3 BLDA-SCNC: 22 MMOL/L — SIGNIFICANT CHANGE UP (ref 21–28)
HCT VFR BLD CALC: 22.1 % — LOW (ref 39–50)
HGB BLD-MCNC: 7.8 G/DL — LOW (ref 13–17)
HOROWITZ INDEX BLDA+IHG-RTO: 21 — SIGNIFICANT CHANGE UP
HYPOCHROMIA BLD QL: SLIGHT — SIGNIFICANT CHANGE UP
IRON SATN MFR SERPL: 23 % — SIGNIFICANT CHANGE UP (ref 16–55)
IRON SATN MFR SERPL: 44 UG/DL — LOW (ref 59–158)
LACTATE SERPL-SCNC: 1.2 MMOL/L — SIGNIFICANT CHANGE UP (ref 0.5–2)
LYMPHOCYTES # BLD AUTO: 1.4 K/UL — SIGNIFICANT CHANGE UP (ref 1–3.3)
LYMPHOCYTES # BLD AUTO: 28.9 % — SIGNIFICANT CHANGE UP (ref 13–44)
MACROCYTES BLD QL: SLIGHT — SIGNIFICANT CHANGE UP
MAGNESIUM SERPL-MCNC: 1.6 MG/DL — LOW (ref 1.8–2.6)
MANUAL SMEAR VERIFICATION: SIGNIFICANT CHANGE UP
MCHC RBC-ENTMCNC: 29.4 PG — SIGNIFICANT CHANGE UP (ref 27–34)
MCHC RBC-ENTMCNC: 35.3 GM/DL — SIGNIFICANT CHANGE UP (ref 32–36)
MCV RBC AUTO: 83.4 FL — SIGNIFICANT CHANGE UP (ref 80–100)
METAMYELOCYTES # FLD: 1.8 % — HIGH (ref 0–0)
MONOCYTES # BLD AUTO: 0.55 K/UL — SIGNIFICANT CHANGE UP (ref 0–0.9)
MONOCYTES NFR BLD AUTO: 11.4 % — SIGNIFICANT CHANGE UP (ref 2–14)
MYELOCYTES NFR BLD: 7 % — HIGH (ref 0–0)
NEUTROPHILS # BLD AUTO: 2.33 K/UL — SIGNIFICANT CHANGE UP (ref 1.8–7.4)
NEUTROPHILS NFR BLD AUTO: 48.2 % — SIGNIFICANT CHANGE UP (ref 43–77)
PCO2 BLDA: 34 MMHG — LOW (ref 35–48)
PH BLDA: 7.41 — SIGNIFICANT CHANGE UP (ref 7.35–7.45)
PHOSPHATE SERPL-MCNC: 2.7 MG/DL — SIGNIFICANT CHANGE UP (ref 2.4–4.7)
PLAT MORPH BLD: NORMAL — SIGNIFICANT CHANGE UP
PLATELET # BLD AUTO: 387 K/UL — SIGNIFICANT CHANGE UP (ref 150–400)
PO2 BLDA: 91 MMHG — SIGNIFICANT CHANGE UP (ref 83–108)
POIKILOCYTOSIS BLD QL AUTO: SIGNIFICANT CHANGE UP
POLYCHROMASIA BLD QL SMEAR: SIGNIFICANT CHANGE UP
POTASSIUM SERPL-MCNC: 3.3 MMOL/L — LOW (ref 3.5–5.3)
POTASSIUM SERPL-SCNC: 3.3 MMOL/L — LOW (ref 3.5–5.3)
PROCALCITONIN SERPL-MCNC: 0.08 NG/ML — SIGNIFICANT CHANGE UP (ref 0.02–0.1)
PROMYELOCYTES # FLD: 1.8 % — HIGH (ref 0–0)
PROT SERPL-MCNC: 5.7 G/DL — LOW (ref 6.6–8.7)
RBC # BLD: 2.65 M/UL — LOW (ref 4.2–5.8)
RBC # FLD: 17.7 % — HIGH (ref 10.3–14.5)
RBC BLD AUTO: ABNORMAL
SAO2 % BLDA: 98.8 % — HIGH (ref 94–98)
SMUDGE CELLS # BLD: PRESENT — SIGNIFICANT CHANGE UP
SODIUM SERPL-SCNC: 141 MMOL/L — SIGNIFICANT CHANGE UP (ref 135–145)
SPECIMEN SOURCE: SIGNIFICANT CHANGE UP
T4 AB SER-ACNC: 6 UG/DL — SIGNIFICANT CHANGE UP (ref 4.5–12)
TARGETS BLD QL SMEAR: SIGNIFICANT CHANGE UP
TIBC SERPL-MCNC: 193 UG/DL — LOW (ref 220–430)
TRANSFERRIN SERPL-MCNC: 135 MG/DL — LOW (ref 180–329)
TSH SERPL-MCNC: 1.1 UIU/ML — SIGNIFICANT CHANGE UP (ref 0.27–4.2)
VIT B12 SERPL-MCNC: 1334 PG/ML — HIGH (ref 232–1245)
WBC # BLD: 4.83 K/UL — SIGNIFICANT CHANGE UP (ref 3.8–10.5)
WBC # FLD AUTO: 4.83 K/UL — SIGNIFICANT CHANGE UP (ref 3.8–10.5)

## 2022-09-03 PROCEDURE — 99233 SBSQ HOSP IP/OBS HIGH 50: CPT

## 2022-09-03 RX ORDER — MAGNESIUM SULFATE 500 MG/ML
2 VIAL (ML) INJECTION ONCE
Refills: 0 | Status: DISCONTINUED | OUTPATIENT
Start: 2022-09-03 | End: 2022-09-03

## 2022-09-03 RX ORDER — MAGNESIUM SULFATE 500 MG/ML
2 VIAL (ML) INJECTION ONCE
Refills: 0 | Status: COMPLETED | OUTPATIENT
Start: 2022-09-03 | End: 2022-09-03

## 2022-09-03 RX ADMIN — Medication 25 GRAM(S): at 11:18

## 2022-09-03 RX ADMIN — QUETIAPINE FUMARATE 200 MILLIGRAM(S): 200 TABLET, FILM COATED ORAL at 05:15

## 2022-09-03 RX ADMIN — Medication 40 MILLIEQUIVALENT(S): at 03:01

## 2022-09-03 RX ADMIN — ONDANSETRON 8 MILLIGRAM(S): 8 TABLET, FILM COATED ORAL at 00:02

## 2022-09-03 RX ADMIN — SODIUM CHLORIDE 100 MILLILITER(S): 9 INJECTION INTRAMUSCULAR; INTRAVENOUS; SUBCUTANEOUS at 16:21

## 2022-09-03 RX ADMIN — LISINOPRIL 10 MILLIGRAM(S): 2.5 TABLET ORAL at 05:14

## 2022-09-03 RX ADMIN — Medication 200 MILLIGRAM(S): at 21:25

## 2022-09-03 RX ADMIN — PANTOPRAZOLE SODIUM 40 MILLIGRAM(S): 20 TABLET, DELAYED RELEASE ORAL at 07:50

## 2022-09-03 RX ADMIN — ENOXAPARIN SODIUM 40 MILLIGRAM(S): 100 INJECTION SUBCUTANEOUS at 21:07

## 2022-09-03 RX ADMIN — QUETIAPINE FUMARATE 200 MILLIGRAM(S): 200 TABLET, FILM COATED ORAL at 17:55

## 2022-09-03 RX ADMIN — BUPROPION HYDROCHLORIDE 200 MILLIGRAM(S): 150 TABLET, EXTENDED RELEASE ORAL at 11:19

## 2022-09-03 NOTE — PROGRESS NOTE ADULT - ASSESSMENT
51y/oM PMH breast CA, lymphoma (follows at [facility] w/Dr. Pinto), major depression, HTN, GERD, presenting with AMS     Hypokalemia, AGMA   Hypomagnesemia   -in setting of chemo/dehydration (chemo last week)   -replace electrolytes   -IVF     AMS  -?psych vs neuro event   -improved this am 9/3  -CTH neg   -f/u neuro   -f/u psych   -UA, utox neg   -tsh, etoh wnl   -elevated ammonia     Hx depression, underlying psych disorder   -as per med rec, taking bupropion, seroquel, fluphenazine   -f/u psych     Normocytic Anemia of chronic disease/inflammation   -iron studies reviewed   -cont to monitor     HTN   -lisinopril     GERD   -PPI     R breast CA, Lymphoma   -[facility]   -last chemo last week     VTE ppx: lovenox sq  51y/oM PMH breast CA, lymphoma (follows at [facility] w/Dr. Pinto), major depression, HTN, GERD, presenting with AMS     Hypokalemia, AGMA   Hypomagnesemia   -in setting of chemo/dehydration (chemo last week)   -replace electrolytes   -IVF     AMS  -?psych vs neuro event   -r/o infection   -improved this am 9/3  -CTH neg   -f/u neuro   -f/u psych   -UA, utox neg   -tsh, etoh wnl   -elevated ammonia   -febrile o/n; f/u cultures     Hx depression, underlying psych disorder   -as per med rec, taking bupropion, seroquel, fluphenazine   -f/u psych     Normocytic Anemia of chronic disease/inflammation   -iron studies reviewed   -cont to monitor     HTN   -lisinopril     GERD   -PPI     R breast CA, Lymphoma   -[facility]   -last chemo last week     VTE ppx: lovenox sq

## 2022-09-04 LAB
ANION GAP SERPL CALC-SCNC: 11 MMOL/L — SIGNIFICANT CHANGE UP (ref 5–17)
ANISOCYTOSIS BLD QL: SLIGHT — SIGNIFICANT CHANGE UP
BASOPHILS # BLD AUTO: 0 K/UL — SIGNIFICANT CHANGE UP (ref 0–0.2)
BASOPHILS NFR BLD AUTO: 0 % — SIGNIFICANT CHANGE UP (ref 0–2)
BUN SERPL-MCNC: 6.3 MG/DL — LOW (ref 8–20)
CALCIUM SERPL-MCNC: 8.3 MG/DL — LOW (ref 8.4–10.5)
CHLORIDE SERPL-SCNC: 105 MMOL/L — SIGNIFICANT CHANGE UP (ref 98–107)
CO2 SERPL-SCNC: 21 MMOL/L — LOW (ref 22–29)
CREAT SERPL-MCNC: 0.93 MG/DL — SIGNIFICANT CHANGE UP (ref 0.5–1.3)
EGFR: 99 ML/MIN/1.73M2 — SIGNIFICANT CHANGE UP
EOSINOPHIL # BLD AUTO: 0.04 K/UL — SIGNIFICANT CHANGE UP (ref 0–0.5)
EOSINOPHIL NFR BLD AUTO: 0.9 % — SIGNIFICANT CHANGE UP (ref 0–6)
GIANT PLATELETS BLD QL SMEAR: PRESENT — SIGNIFICANT CHANGE UP
GLUCOSE SERPL-MCNC: 102 MG/DL — HIGH (ref 70–99)
HCT VFR BLD CALC: 23.5 % — LOW (ref 39–50)
HGB BLD-MCNC: 8.6 G/DL — LOW (ref 13–17)
HYPOCHROMIA BLD QL: SLIGHT — SIGNIFICANT CHANGE UP
LYMPHOCYTES # BLD AUTO: 1.19 K/UL — SIGNIFICANT CHANGE UP (ref 1–3.3)
LYMPHOCYTES # BLD AUTO: 27.9 % — SIGNIFICANT CHANGE UP (ref 13–44)
MACROCYTES BLD QL: SLIGHT — SIGNIFICANT CHANGE UP
MAGNESIUM SERPL-MCNC: 1.9 MG/DL — SIGNIFICANT CHANGE UP (ref 1.6–2.6)
MANUAL SMEAR VERIFICATION: SIGNIFICANT CHANGE UP
MCHC RBC-ENTMCNC: 30.2 PG — SIGNIFICANT CHANGE UP (ref 27–34)
MCHC RBC-ENTMCNC: 36.6 GM/DL — HIGH (ref 32–36)
MCV RBC AUTO: 82.5 FL — SIGNIFICANT CHANGE UP (ref 80–100)
METAMYELOCYTES # FLD: 1.8 % — HIGH (ref 0–0)
MICROCYTES BLD QL: SLIGHT — SIGNIFICANT CHANGE UP
MONOCYTES # BLD AUTO: 1.19 K/UL — HIGH (ref 0–0.9)
MONOCYTES NFR BLD AUTO: 27.9 % — HIGH (ref 2–14)
MYELOCYTES NFR BLD: 2.7 % — HIGH (ref 0–0)
NEUTROPHILS # BLD AUTO: 1.54 K/UL — LOW (ref 1.8–7.4)
NEUTROPHILS NFR BLD AUTO: 36.1 % — LOW (ref 43–77)
PHOSPHATE SERPL-MCNC: 2.2 MG/DL — LOW (ref 2.4–4.7)
PLAT MORPH BLD: NORMAL — SIGNIFICANT CHANGE UP
PLATELET # BLD AUTO: 409 K/UL — HIGH (ref 150–400)
POIKILOCYTOSIS BLD QL AUTO: SIGNIFICANT CHANGE UP
POLYCHROMASIA BLD QL SMEAR: SIGNIFICANT CHANGE UP
POTASSIUM SERPL-MCNC: 3.2 MMOL/L — LOW (ref 3.5–5.3)
POTASSIUM SERPL-SCNC: 3.2 MMOL/L — LOW (ref 3.5–5.3)
RBC # BLD: 2.85 M/UL — LOW (ref 4.2–5.8)
RBC # FLD: 17.5 % — HIGH (ref 10.3–14.5)
RBC BLD AUTO: ABNORMAL
SCHISTOCYTES BLD QL AUTO: SLIGHT — SIGNIFICANT CHANGE UP
SMUDGE CELLS # BLD: PRESENT — SIGNIFICANT CHANGE UP
SODIUM SERPL-SCNC: 137 MMOL/L — SIGNIFICANT CHANGE UP (ref 135–145)
TARGETS BLD QL SMEAR: SIGNIFICANT CHANGE UP
VARIANT LYMPHS # BLD: 2.7 % — SIGNIFICANT CHANGE UP (ref 0–6)
WBC # BLD: 4.27 K/UL — SIGNIFICANT CHANGE UP (ref 3.8–10.5)
WBC # FLD AUTO: 4.27 K/UL — SIGNIFICANT CHANGE UP (ref 3.8–10.5)

## 2022-09-04 PROCEDURE — 99232 SBSQ HOSP IP/OBS MODERATE 35: CPT

## 2022-09-04 RX ORDER — POTASSIUM PHOSPHATE, MONOBASIC POTASSIUM PHOSPHATE, DIBASIC 236; 224 MG/ML; MG/ML
15 INJECTION, SOLUTION INTRAVENOUS ONCE
Refills: 0 | Status: COMPLETED | OUTPATIENT
Start: 2022-09-04 | End: 2022-09-04

## 2022-09-04 RX ORDER — POTASSIUM CHLORIDE 20 MEQ
40 PACKET (EA) ORAL ONCE
Refills: 0 | Status: COMPLETED | OUTPATIENT
Start: 2022-09-04 | End: 2022-09-04

## 2022-09-04 RX ORDER — SODIUM CHLORIDE 9 MG/ML
1000 INJECTION, SOLUTION INTRAVENOUS
Refills: 0 | Status: DISCONTINUED | OUTPATIENT
Start: 2022-09-04 | End: 2022-09-05

## 2022-09-04 RX ADMIN — SODIUM CHLORIDE 100 MILLILITER(S): 9 INJECTION INTRAMUSCULAR; INTRAVENOUS; SUBCUTANEOUS at 01:21

## 2022-09-04 RX ADMIN — QUETIAPINE FUMARATE 200 MILLIGRAM(S): 200 TABLET, FILM COATED ORAL at 16:37

## 2022-09-04 RX ADMIN — Medication 40 MILLIEQUIVALENT(S): at 10:20

## 2022-09-04 RX ADMIN — BUPROPION HYDROCHLORIDE 200 MILLIGRAM(S): 150 TABLET, EXTENDED RELEASE ORAL at 10:20

## 2022-09-04 RX ADMIN — ENOXAPARIN SODIUM 40 MILLIGRAM(S): 100 INJECTION SUBCUTANEOUS at 21:29

## 2022-09-04 RX ADMIN — SODIUM CHLORIDE 75 MILLILITER(S): 9 INJECTION, SOLUTION INTRAVENOUS at 16:39

## 2022-09-04 RX ADMIN — PANTOPRAZOLE SODIUM 40 MILLIGRAM(S): 20 TABLET, DELAYED RELEASE ORAL at 05:17

## 2022-09-04 RX ADMIN — POTASSIUM PHOSPHATE, MONOBASIC POTASSIUM PHOSPHATE, DIBASIC 62.5 MILLIMOLE(S): 236; 224 INJECTION, SOLUTION INTRAVENOUS at 10:20

## 2022-09-04 RX ADMIN — QUETIAPINE FUMARATE 200 MILLIGRAM(S): 200 TABLET, FILM COATED ORAL at 05:17

## 2022-09-04 RX ADMIN — LISINOPRIL 10 MILLIGRAM(S): 2.5 TABLET ORAL at 05:17

## 2022-09-04 NOTE — PROGRESS NOTE ADULT - SUBJECTIVE AND OBJECTIVE BOX
YUKO CORDOVA    [id number]    51y      Male    CC: ams    INTERVAL HPI/OVERNIGHT EVENTS: pt seen and examined. awake, alert, eating bfast this am     REVIEW OF SYSTEMS:    CONSTITUTIONAL: No fever, weight loss  RESPIRATORY: No cough, wheezing, hemoptysis; No shortness of breath  CARDIOVASCULAR: No chest pain, palpitations  GASTROINTESTINAL: No abdominal or epigastric pain. No nausea, vomiting  NEUROLOGICAL: No headaches    Vital Signs Last 24 Hrs  T(C): 37.2 (03 Sep 2022 11:45), Max: 38.1 (02 Sep 2022 20:26)  T(F): 98.9 (03 Sep 2022 11:45), Max: 100.6 (02 Sep 2022 20:26)  HR: 98 (03 Sep 2022 11:45) (98 - 104)  BP: 98/61 (03 Sep 2022 11:45) (98/61 - 129/64)  BP(mean): --  RR: 18 (03 Sep 2022 11:45) (18 - 20)  SpO2: 95% (03 Sep 2022 11:45) (95% - 100%)    Parameters below as of 03 Sep 2022 11:45  Patient On (Oxygen Delivery Method): room air        PHYSICAL EXAM:    GENERAL: NAD  HEENT: +EOMI  NECK: soft, supple  CHEST/LUNG: Clear to auscultation bilaterally  HEART: S1S2+, Regular rate and rhythm  ABDOMEN: Soft, Nontender, Nondistended; Bowel sounds present  SKIN: warm, dry  NEURO: Awake, alert; grossly non-focal   PSYCH: normal affect     LABS:                        7.8    4.83  )-----------( 387      ( 03 Sep 2022 02:21 )             22.1     09-03    141  |  105  |  9.5  ----------------------------<  87  3.3<L>   |  19.0<L>  |  0.97    Ca    8.3<L>      03 Sep 2022 02:21  Phos  2.7     09-  Mg     1.6     -03    TPro  5.7<L>  /  Alb  3.4  /  TBili  0.4  /  DBili  x   /  AST  37  /  ALT  31  /  AlkPhos  58  09-03    PT/INR - ( 02 Sep 2022 14:40 )   PT: 14.8 sec;   INR: 1.27 ratio         PTT - ( 02 Sep 2022 14:40 )  PTT:28.1 sec  Urinalysis Basic - ( 02 Sep 2022 19:10 )    Color: Yellow / Appearance: Clear / S.010 / pH: x  Gluc: x / Ketone: Large  / Bili: Negative / Urobili: Negative mg/dL   Blood: x / Protein: Negative / Nitrite: Negative   Leuk Esterase: Negative / RBC: x / WBC x   Sq Epi: x / Non Sq Epi: x / Bacteria: x          MEDICATIONS  (STANDING):  buPROPion SR (12-Hour) 200 milliGRAM(s) Oral daily  enoxaparin Injectable 40 milliGRAM(s) SubCutaneous every 24 hours  lisinopril 10 milliGRAM(s) Oral daily  pantoprazole    Tablet 40 milliGRAM(s) Oral before breakfast  QUEtiapine 200 milliGRAM(s) Oral two times a day  sodium chloride 0.9%. 1000 milliLiter(s) (100 mL/Hr) IV Continuous <Continuous>    MEDICATIONS  (PRN):  acetaminophen     Tablet .. 650 milliGRAM(s) Oral every 6 hours PRN Temp greater or equal to 38C (100.4F), Mild Pain (1 - 3)  aluminum hydroxide/magnesium hydroxide/simethicone Suspension 30 milliLiter(s) Oral every 4 hours PRN Dyspepsia  melatonin 3 milliGRAM(s) Oral at bedtime PRN Insomnia  ondansetron Injectable 8 milliGRAM(s) IV Push every 8 hours PRN Nausea and/or Vomiting      RADIOLOGY & ADDITIONAL TESTS:  
YUKO CORDOVA    [id number]    51y      Male    CC: ams    INTERVAL HPI/OVERNIGHT EVENTS: pt seen and examined. no acute events reported o/n    REVIEW OF SYSTEMS:    CONSTITUTIONAL: No fever, weight loss  RESPIRATORY: No cough, wheezing, hemoptysis; No shortness of breath  CARDIOVASCULAR: No chest pain, palpitations  GASTROINTESTINAL: No abdominal or epigastric pain. No nausea, vomiting  NEUROLOGICAL: No headaches    Vital Signs Last 24 Hrs  T(C): 36.8 (04 Sep 2022 08:39), Max: 37.3 (03 Sep 2022 16:03)  T(F): 98.3 (04 Sep 2022 08:39), Max: 99.2 (03 Sep 2022 16:03)  HR: 94 (04 Sep 2022 08:39) (88 - 106)  BP: 106/70 (04 Sep 2022 08:39) (104/66 - 115/74)  BP(mean): --  RR: 16 (04 Sep 2022 08:39) (16 - 18)  SpO2: 99% (04 Sep 2022 08:39) (94% - 100%)    Parameters below as of 04 Sep 2022 08:39  Patient On (Oxygen Delivery Method): room air        PHYSICAL EXAM:    GENERAL: NAD  HEENT: +EOMI  NECK: soft, supple  CHEST/LUNG: Clear to auscultation bilaterally  HEART: S1S2+, Regular rate and rhythm  ABDOMEN: Soft, Nontender, Nondistended; Bowel sounds present  SKIN: warm, dry  NEURO: Awake, alert; grossly non-focal   PSYCH: normal affect     LABS:                        8.6    4.27  )-----------( 409      ( 04 Sep 2022 06:15 )             23.5     09-04    137  |  105  |  6.3<L>  ----------------------------<  102<H>  3.2<L>   |  21.0<L>  |  0.93    Ca    8.3<L>      04 Sep 2022 06:15  Phos  2.2     09-04  Mg     1.9     -04    TPro  5.7<L>  /  Alb  3.4  /  TBili  0.4  /  DBili  x   /  AST  37  /  ALT  31  /  AlkPhos  58  09-03    PT/INR - ( 02 Sep 2022 14:40 )   PT: 14.8 sec;   INR: 1.27 ratio         PTT - ( 02 Sep 2022 14:40 )  PTT:28.1 sec  Urinalysis Basic - ( 02 Sep 2022 19:10 )    Color: Yellow / Appearance: Clear / S.010 / pH: x  Gluc: x / Ketone: Large  / Bili: Negative / Urobili: Negative mg/dL   Blood: x / Protein: Negative / Nitrite: Negative   Leuk Esterase: Negative / RBC: x / WBC x   Sq Epi: x / Non Sq Epi: x / Bacteria: x          MEDICATIONS  (STANDING):  buPROPion SR (12-Hour) 200 milliGRAM(s) Oral daily  enoxaparin Injectable 40 milliGRAM(s) SubCutaneous every 24 hours  lisinopril 10 milliGRAM(s) Oral daily  pantoprazole    Tablet 40 milliGRAM(s) Oral before breakfast  QUEtiapine 200 milliGRAM(s) Oral two times a day  sodium chloride 0.9%. 1000 milliLiter(s) (100 mL/Hr) IV Continuous <Continuous>    MEDICATIONS  (PRN):  acetaminophen     Tablet .. 650 milliGRAM(s) Oral every 6 hours PRN Temp greater or equal to 38C (100.4F), Mild Pain (1 - 3)  aluminum hydroxide/magnesium hydroxide/simethicone Suspension 30 milliLiter(s) Oral every 4 hours PRN Dyspepsia  melatonin 3 milliGRAM(s) Oral at bedtime PRN Insomnia  ondansetron Injectable 8 milliGRAM(s) IV Push every 8 hours PRN Nausea and/or Vomiting      RADIOLOGY & ADDITIONAL TESTS:

## 2022-09-04 NOTE — PROGRESS NOTE ADULT - ASSESSMENT
51y/oM PMH breast CA, lymphoma (follows at [facility] w/Dr. Pinto), major depression, HTN, GERD, presenting with AMS     Hypokalemia, AGMA   Hypomagnesemia   -in setting of chemo/dehydration (chemo last week)   -replace electrolytes   -IVF     AMS  -?psych vs neuro event   -r/o infection   -improved this am 9/3  -CTH neg   -UA, ucx, utox neg   -tsh, etoh wnl   -elevated ammonia   -blood cultures ngtd    Hx depression, underlying psych disorder   -as per med rec, taking bupropion, seroquel, fluphenazine   -f/u psych     Normocytic Anemia of chronic disease/inflammation   -iron studies reviewed   -cont to monitor     HTN   -lisinopril     GERD   -PPI     R breast CA, Lymphoma   -f/[facility]   -last chemo last week     VTE ppx: lovenox sq     dispo:  51y/oM PMH breast CA, lymphoma (follows at [facility] w/Dr. Pinto), major depression, HTN, GERD, presenting with AMS     Hypokalemia, AGMA   Hypomagnesemia   -in setting of chemo/dehydration (chemo last week)   -replace electrolytes   -IVF     AMS  -?psych vs neuro event   -r/o infection   -improved this am 9/3  -CTH neg   -UA, ucx, utox neg   -tsh, etoh wnl   -elevated ammonia   -blood cultures ngtd    Hx depression, underlying psych disorder   -as per med rec, taking bupropion, seroquel, fluphenazine   -f/u psych     Normocytic Anemia of chronic disease/inflammation   -iron studies reviewed   -cont to monitor     HTN   -lisinopril     GERD   -PPI     R breast CA, Lymphoma   -f/u [facility]   -last chemo last week     VTE ppx: lovenox sq     dispo: wife, Court, updated 51y/oM PMH breast CA, lymphoma (follows at [facility] w/Dr. Pinto), major depression, HTN, GERD, presenting with AMS     Hypokalemia, AGMA   Hypomagnesemia   -in setting of chemo/dehydration (chemo last week)   -replace electrolytes   -IVF     AMS  -?psych vs neuro event   -r/o infection   -improved this am 9/3  -CTH neg   -UA, ucx, utox neg   -tsh, etoh wnl   -elevated ammonia   -blood cultures ngtd    Hx depression, underlying psych disorder   -as per med rec, taking bupropion, seroquel, fluphenazine   -f/u psych     Normocytic Anemia of chronic disease/inflammation   -iron studies reviewed   -cont to monitor     HTN   -lisinopril     GERD   -PPI     R breast CA, Lymphoma   -f/u [facility]   -last chemo last week     VTE ppx: lovenox sq     dispo: poss home next 24-48hrs  wife, Court, updated

## 2022-09-05 ENCOUNTER — TRANSCRIPTION ENCOUNTER (OUTPATIENT)
Age: 52
End: 2022-09-05

## 2022-09-05 VITALS
SYSTOLIC BLOOD PRESSURE: 109 MMHG | OXYGEN SATURATION: 99 % | TEMPERATURE: 98 F | RESPIRATION RATE: 18 BRPM | HEART RATE: 81 BPM | DIASTOLIC BLOOD PRESSURE: 68 MMHG

## 2022-09-05 LAB
ANION GAP SERPL CALC-SCNC: 9 MMOL/L — SIGNIFICANT CHANGE UP (ref 5–17)
ANISOCYTOSIS BLD QL: SLIGHT — SIGNIFICANT CHANGE UP
BASOPHILS # BLD AUTO: 0.08 K/UL — SIGNIFICANT CHANGE UP (ref 0–0.2)
BASOPHILS NFR BLD AUTO: 1.8 % — SIGNIFICANT CHANGE UP (ref 0–2)
BUN SERPL-MCNC: <3 MG/DL — LOW (ref 8–20)
CALCIUM SERPL-MCNC: 8.3 MG/DL — LOW (ref 8.4–10.5)
CHLORIDE SERPL-SCNC: 105 MMOL/L — SIGNIFICANT CHANGE UP (ref 98–107)
CO2 SERPL-SCNC: 26 MMOL/L — SIGNIFICANT CHANGE UP (ref 22–29)
CREAT SERPL-MCNC: 0.78 MG/DL — SIGNIFICANT CHANGE UP (ref 0.5–1.3)
EGFR: 107 ML/MIN/1.73M2 — SIGNIFICANT CHANGE UP
EOSINOPHIL # BLD AUTO: 0 K/UL — SIGNIFICANT CHANGE UP (ref 0–0.5)
EOSINOPHIL NFR BLD AUTO: 0 % — SIGNIFICANT CHANGE UP (ref 0–6)
GIANT PLATELETS BLD QL SMEAR: PRESENT — SIGNIFICANT CHANGE UP
GLUCOSE SERPL-MCNC: 105 MG/DL — HIGH (ref 70–99)
HCT VFR BLD CALC: 23.6 % — LOW (ref 39–50)
HGB BLD-MCNC: 8.4 G/DL — LOW (ref 13–17)
LYMPHOCYTES # BLD AUTO: 1.51 K/UL — SIGNIFICANT CHANGE UP (ref 1–3.3)
LYMPHOCYTES # BLD AUTO: 36 % — SIGNIFICANT CHANGE UP (ref 13–44)
MACROCYTES BLD QL: SLIGHT — SIGNIFICANT CHANGE UP
MAGNESIUM SERPL-MCNC: 1.8 MG/DL — SIGNIFICANT CHANGE UP (ref 1.6–2.6)
MANUAL SMEAR VERIFICATION: SIGNIFICANT CHANGE UP
MCHC RBC-ENTMCNC: 29.7 PG — SIGNIFICANT CHANGE UP (ref 27–34)
MCHC RBC-ENTMCNC: 35.6 GM/DL — SIGNIFICANT CHANGE UP (ref 32–36)
MCV RBC AUTO: 83.4 FL — SIGNIFICANT CHANGE UP (ref 80–100)
METAMYELOCYTES # FLD: 1.8 % — HIGH (ref 0–0)
MONOCYTES # BLD AUTO: 0.4 K/UL — SIGNIFICANT CHANGE UP (ref 0–0.9)
MONOCYTES NFR BLD AUTO: 9.6 % — SIGNIFICANT CHANGE UP (ref 2–14)
MYELOCYTES NFR BLD: 6.1 % — HIGH (ref 0–0)
NEUTROPHILS # BLD AUTO: 1.77 K/UL — LOW (ref 1.8–7.4)
NEUTROPHILS NFR BLD AUTO: 42.1 % — LOW (ref 43–77)
PHOSPHATE SERPL-MCNC: 2.3 MG/DL — LOW (ref 2.4–4.7)
PLAT MORPH BLD: NORMAL — SIGNIFICANT CHANGE UP
PLATELET # BLD AUTO: 340 K/UL — SIGNIFICANT CHANGE UP (ref 150–400)
POLYCHROMASIA BLD QL SMEAR: SLIGHT — SIGNIFICANT CHANGE UP
POTASSIUM SERPL-MCNC: 3.7 MMOL/L — SIGNIFICANT CHANGE UP (ref 3.5–5.3)
POTASSIUM SERPL-SCNC: 3.7 MMOL/L — SIGNIFICANT CHANGE UP (ref 3.5–5.3)
RBC # BLD: 2.83 M/UL — LOW (ref 4.2–5.8)
RBC # FLD: 17.5 % — HIGH (ref 10.3–14.5)
RBC BLD AUTO: ABNORMAL
SODIUM SERPL-SCNC: 140 MMOL/L — SIGNIFICANT CHANGE UP (ref 135–145)
TARGETS BLD QL SMEAR: SLIGHT — SIGNIFICANT CHANGE UP
VARIANT LYMPHS # BLD: 2.6 % — SIGNIFICANT CHANGE UP (ref 0–6)
WBC # BLD: 4.2 K/UL — SIGNIFICANT CHANGE UP (ref 3.8–10.5)
WBC # FLD AUTO: 4.2 K/UL — SIGNIFICANT CHANGE UP (ref 3.8–10.5)

## 2022-09-05 PROCEDURE — 81003 URINALYSIS AUTO W/O SCOPE: CPT

## 2022-09-05 PROCEDURE — 87040 BLOOD CULTURE FOR BACTERIA: CPT

## 2022-09-05 PROCEDURE — 93005 ELECTROCARDIOGRAM TRACING: CPT

## 2022-09-05 PROCEDURE — 99239 HOSP IP/OBS DSCHRG MGMT >30: CPT

## 2022-09-05 PROCEDURE — 96376 TX/PRO/DX INJ SAME DRUG ADON: CPT

## 2022-09-05 PROCEDURE — 87086 URINE CULTURE/COLONY COUNT: CPT

## 2022-09-05 PROCEDURE — 84436 ASSAY OF TOTAL THYROXINE: CPT

## 2022-09-05 PROCEDURE — 82140 ASSAY OF AMMONIA: CPT

## 2022-09-05 PROCEDURE — 84145 PROCALCITONIN (PCT): CPT

## 2022-09-05 PROCEDURE — 80307 DRUG TEST PRSMV CHEM ANLYZR: CPT

## 2022-09-05 PROCEDURE — 85610 PROTHROMBIN TIME: CPT

## 2022-09-05 PROCEDURE — 87637 SARSCOV2&INF A&B&RSV AMP PRB: CPT

## 2022-09-05 PROCEDURE — 82728 ASSAY OF FERRITIN: CPT

## 2022-09-05 PROCEDURE — 84466 ASSAY OF TRANSFERRIN: CPT

## 2022-09-05 PROCEDURE — 82746 ASSAY OF FOLIC ACID SERUM: CPT

## 2022-09-05 PROCEDURE — G1004: CPT

## 2022-09-05 PROCEDURE — 82607 VITAMIN B-12: CPT

## 2022-09-05 PROCEDURE — 80048 BASIC METABOLIC PNL TOTAL CA: CPT

## 2022-09-05 PROCEDURE — 83735 ASSAY OF MAGNESIUM: CPT

## 2022-09-05 PROCEDURE — 84443 ASSAY THYROID STIM HORMONE: CPT

## 2022-09-05 PROCEDURE — 83605 ASSAY OF LACTIC ACID: CPT

## 2022-09-05 PROCEDURE — 85730 THROMBOPLASTIN TIME PARTIAL: CPT

## 2022-09-05 PROCEDURE — 83540 ASSAY OF IRON: CPT

## 2022-09-05 PROCEDURE — 70450 CT HEAD/BRAIN W/O DYE: CPT | Mod: MG

## 2022-09-05 PROCEDURE — 99285 EMERGENCY DEPT VISIT HI MDM: CPT

## 2022-09-05 PROCEDURE — 80053 COMPREHEN METABOLIC PANEL: CPT

## 2022-09-05 PROCEDURE — 82803 BLOOD GASES ANY COMBINATION: CPT

## 2022-09-05 PROCEDURE — 96374 THER/PROPH/DIAG INJ IV PUSH: CPT

## 2022-09-05 PROCEDURE — 84100 ASSAY OF PHOSPHORUS: CPT

## 2022-09-05 PROCEDURE — 83550 IRON BINDING TEST: CPT

## 2022-09-05 PROCEDURE — 71045 X-RAY EXAM CHEST 1 VIEW: CPT

## 2022-09-05 PROCEDURE — 85025 COMPLETE CBC W/AUTO DIFF WBC: CPT

## 2022-09-05 PROCEDURE — 36415 COLL VENOUS BLD VENIPUNCTURE: CPT

## 2022-09-05 RX ORDER — GABAPENTIN 400 MG/1
100 CAPSULE ORAL THREE TIMES A DAY
Refills: 0 | Status: DISCONTINUED | OUTPATIENT
Start: 2022-09-05 | End: 2022-09-05

## 2022-09-05 RX ORDER — POTASSIUM PHOSPHATE, MONOBASIC POTASSIUM PHOSPHATE, DIBASIC 236; 224 MG/ML; MG/ML
15 INJECTION, SOLUTION INTRAVENOUS ONCE
Refills: 0 | Status: COMPLETED | OUTPATIENT
Start: 2022-09-05 | End: 2022-09-05

## 2022-09-05 RX ADMIN — BUPROPION HYDROCHLORIDE 200 MILLIGRAM(S): 150 TABLET, EXTENDED RELEASE ORAL at 09:21

## 2022-09-05 RX ADMIN — GABAPENTIN 100 MILLIGRAM(S): 400 CAPSULE ORAL at 11:56

## 2022-09-05 RX ADMIN — SODIUM CHLORIDE 75 MILLILITER(S): 9 INJECTION, SOLUTION INTRAVENOUS at 06:09

## 2022-09-05 RX ADMIN — LISINOPRIL 10 MILLIGRAM(S): 2.5 TABLET ORAL at 05:56

## 2022-09-05 RX ADMIN — Medication 3 MILLIGRAM(S): at 00:10

## 2022-09-05 RX ADMIN — POTASSIUM PHOSPHATE, MONOBASIC POTASSIUM PHOSPHATE, DIBASIC 62.5 MILLIMOLE(S): 236; 224 INJECTION, SOLUTION INTRAVENOUS at 09:21

## 2022-09-05 RX ADMIN — PANTOPRAZOLE SODIUM 40 MILLIGRAM(S): 20 TABLET, DELAYED RELEASE ORAL at 05:56

## 2022-09-05 RX ADMIN — QUETIAPINE FUMARATE 200 MILLIGRAM(S): 200 TABLET, FILM COATED ORAL at 05:56

## 2022-09-05 NOTE — DISCHARGE NOTE PROVIDER - NSDCMRMEDTOKEN_GEN_ALL_CORE_FT
buPROPion 200 mg/12 hours (SR) oral tablet, extended release: 1 tab(s) orally once a day  fluPHENAZine 2.5 mg/mL injectable solution: 2 milliliter(s) injectable every 4 weeks  lisinopril 10 mg oral tablet: 1 tab(s) orally once a day  omeprazole 20 mg oral delayed release capsule: 1 cap(s) orally once a day  SEROquel 200 mg oral tablet: 1 tab(s) orally 2 times a day  Zofran 8 mg oral tablet: 1 tab(s) orally 3 times a day, As Needed

## 2022-09-05 NOTE — DISCHARGE NOTE PROVIDER - HOSPITAL COURSE
52y/o M w/ PMH of Breast CA, Lymphoma (follows at [facility] w/ Dr. Pinto), Major Depression/Psych Disorder, HTN, GERD presenting with chief complaint of AMS. Per wife, patient initially became "out of it" last Thursday. He had a blank stare and refused to eat or drink. He was in the [facility] ED on Saturday and was told he had a "viral infection" and was discharged. He returned to his baseline however similar symptoms occurred this morning. She attempted to take him to [facility] where he normally follows but he refused to get in the car, therefore she called EMS. He has a history of Breast CA and Lymphoma and receives chemotherapy every week, last received last week. 52y/o M w/ PMH of Breast CA, Lymphoma (follows at [facility] w/ Dr. Pinto), Major Depression/Psych Disorder, HTN, GERD presenting with chief complaint of AMS. Per wife, patient initially became "out of it" last Thursday. He had a blank stare and refused to eat or drink. He was in the [facility] ED on Saturday and was told he had a "viral infection" and was discharged. He returned to his baseline however similar symptoms occurred this morning. She attempted to take him to [facility] where he normally follows but he refused to get in the car, therefore she called EMS. He has a history of Breast CA and Lymphoma and receives chemotherapy every week, last received last week. On admission, pt with anion gap metabolic acidosis, hypokalemia, hypomagnesemia, likely due to dehydration and recent chemo. Electrolytes repleted, given IVF. Infection ruled out, cultures negative. Pt now alert and oriented. pt to dc home

## 2022-09-05 NOTE — DISCHARGE NOTE NURSING/CASE MANAGEMENT/SOCIAL WORK - PATIENT PORTAL LINK FT
You can access the FollowMyHealth Patient Portal offered by [facility] by registering at the following website: [email or web address]. By joining [employer]’s FollowMyHealth portal, you will also be able to view your health information using other applications (apps) compatible with our system.

## 2022-09-05 NOTE — DISCHARGE NOTE PROVIDER - NSDCCPCAREPLAN_GEN_ALL_CORE_FT
PRINCIPAL DISCHARGE DIAGNOSIS  Diagnosis: Altered mental status  Assessment and Plan of Treatment:       SECONDARY DISCHARGE DIAGNOSES  Diagnosis: Hypokalemia  Assessment and Plan of Treatment:

## 2022-09-05 NOTE — DISCHARGE NOTE NURSING/CASE MANAGEMENT/SOCIAL WORK - NSDCPEFALRISK_GEN_ALL_CORE
For information on Fall & Injury Prevention, visit: [email or web address] OR  [email or web address] OR  https://www.cdc.gov/steadi/patient.html

## 2022-09-05 NOTE — DISCHARGE NOTE PROVIDER - CARE PROVIDER_API CALL
NOEMY COLEMAN  Internal Medicine  [address] [place] [address]  Phone: ()-  Fax: ()-  Follow Up Time:     PMD,   Phone: (   )    -  Fax: (   )    -  Follow Up Time:

## 2022-09-05 NOTE — CHART NOTE - NSCHARTNOTEFT_GEN_A_CORE
51y/oM PMH breast CA, lymphoma (follows at [facility] w/Dr. Pinto), major depression, HTN, GERD, presenting with b/l LE tingling x 1 year. The patient states that since having chemo he has been having paresthesia of the LE. PT denies dizziness, headaches, facial droop, change in speech and gait instability, CP, palpitations, SOB, dyspnea.    T(C): 36.7   HR: 90   BP: 117/76   RR: 18   SpO2: 97%     CONSTITUTIONAL: Well groomed, no apparent distress  EYES: PERRLA and symmetric, EOMI, No conjunctival or scleral injection, non-icteric  ENMT: Oral mucosa with moist membranes. Normal dentition; no pharyngeal injection or exudates             NECK: Supple, symmetric and without tracheal deviation   RESP: No respiratory distress, no use of accessory muscles; CTA b/l, no WRR  CV: RRR, +S1S2, no MRG; no JVD; no peripheral edema  GI: Soft, NT, ND, no rebound, no guarding; no palpable masses; no hepatosplenomegaly; no hernia palpated  LYMPH: No cervical LAD or tenderness; no axillary LAD or tenderness; no inguinal LAD or tenderness  MSK: Normal gait; No digital clubbing or cyanosis; examination of the (head/neck/spine/ribs/pelvis, RUE, LUE, RLE, LLE) without misalignment,            Normal ROM without pain, no spinal tenderness, normal muscle strength/tone  SKIN: No rashes or ulcers noted; no subcutaneous nodules or induration palpable  NEURO: CN II-XII intact; normal reflexes in upper and lower extremities, sensation intact in upper and lower extremities b/l to light touch, mild   PSYCH: Appropriate insight/judgment; A+O x 3, mood and affect appropriate, recent/remote memory intact    NIH scale: 0 51y/oM PMH breast CA, lymphoma (follows at [facility] w/Dr. Pinto), major depression, HTN, GERD, presenting with b/l LE tingling x 1 year. The patient states that since having chemo he has been having paresthesia of the LE. PT denies dizziness, headaches, facial droop, change in speech and gait instability, CP, palpitations, SOB, dyspnea.    T(C): 36.7   HR: 90   BP: 117/76   RR: 18   SpO2: 97%     CONSTITUTIONAL: Well groomed, pt is crying in bed   EYES: PERRLA and symmetric, EOMI, No conjunctival or scleral injection, non-icteric  ENMT: Oral mucosa with moist membranes. Normal dentition; no pharyngeal injection or exudates             NECK: Supple, symmetric and without tracheal deviation   RESP: No respiratory distress, no use of accessory muscles; CTA b/l, no WRR  CV: RRR, +S1S2, no MRG; no JVD; no peripheral edema  GI: Soft, NT, ND, no rebound, no guarding; no palpable masses; no hepatosplenomegaly; no hernia palpated  LYMPH: No cervical LAD or tenderness; no axillary LAD or tenderness; no inguinal LAD or tenderness  MSK: Normal gait; No digital clubbing or cyanosis; examination of the (head/neck/spine/ribs/pelvis, RUE, LUE, RLE, LLE) without misalignment,            Normal ROM without pain, no spinal tenderness, normal muscle strength/tone  SKIN: No rashes or ulcers noted; no subcutaneous nodules or induration palpable  NEURO: CN II-XII intact; normal reflexes in upper and lower extremities, sensation intact in upper and lower extremities b/l to light touch, mild difficulty with finger to nose b/l   PSYCH: Appropriate insight/judgment; A+O x 3, mood and affect appropriate, recent/remote memory intact    NIH scale: 0    Assessment:   - Peripheral neuropathy  -Breast Ca   -Lymphoma  -MDD    Plan:  -Start on Gabapentin 100 mg TID, renal functioning WNL, hold for lethargy   -check labs  -monitor for worsening sx   -f/u with PCP

## 2022-09-05 NOTE — DISCHARGE NOTE PROVIDER - ATTENDING DISCHARGE PHYSICAL EXAMINATION:
Vital Signs Last 24 Hrs  T(C): 36.7 (05 Sep 2022 05:54), Max: 37.3 (04 Sep 2022 21:29)  T(F): 98.1 (05 Sep 2022 05:54), Max: 99.2 (04 Sep 2022 21:29)  HR: 90 (05 Sep 2022 05:54) (90 - 94)  BP: 117/76 (05 Sep 2022 05:54) (114/75 - 132/78)  BP(mean): --  RR: 18 (05 Sep 2022 05:54) (18 - 18)  SpO2: 97% (05 Sep 2022 05:54) (95% - 98%)    Parameters below as of 05 Sep 2022 05:54  Patient On (Oxygen Delivery Method): room air    GENERAL: NAD  HEENT: +EOMI  NECK: soft, supple  CHEST/LUNG: Clear to auscultation bilaterally  HEART: S1S2+, Regular rate and rhythm  ABDOMEN: Soft, Nontender, Nondistended; Bowel sounds present  SKIN: warm, dry  NEURO: Awake, alert; grossly non-focal   PSYCH: normal affect

## 2022-09-07 LAB
CULTURE RESULTS: SIGNIFICANT CHANGE UP
CULTURE RESULTS: SIGNIFICANT CHANGE UP
SPECIMEN SOURCE: SIGNIFICANT CHANGE UP
SPECIMEN SOURCE: SIGNIFICANT CHANGE UP

## 2022-09-08 ENCOUNTER — INPATIENT (INPATIENT)
Facility: HOSPITAL | Age: 52
LOS: 11 days | Discharge: PSYCHIATRIC OR SUBSTANCE USE FACILITY | DRG: 885 | End: 2022-09-20
Attending: INTERNAL MEDICINE | Admitting: STUDENT IN AN ORGANIZED HEALTH CARE EDUCATION/TRAINING PROGRAM
Payer: COMMERCIAL

## 2022-09-08 VITALS
HEIGHT: 70 IN | OXYGEN SATURATION: 99 % | RESPIRATION RATE: 20 BRPM | DIASTOLIC BLOOD PRESSURE: 84 MMHG | TEMPERATURE: 98 F | SYSTOLIC BLOOD PRESSURE: 141 MMHG | HEART RATE: 114 BPM | WEIGHT: 199.96 LBS

## 2022-09-08 LAB
BASOPHILS # BLD AUTO: 0.05 K/UL — SIGNIFICANT CHANGE UP (ref 0–0.2)
BASOPHILS NFR BLD AUTO: 0.5 % — SIGNIFICANT CHANGE UP (ref 0–2)
EOSINOPHIL # BLD AUTO: 0.02 K/UL — SIGNIFICANT CHANGE UP (ref 0–0.5)
EOSINOPHIL NFR BLD AUTO: 0.2 % — SIGNIFICANT CHANGE UP (ref 0–6)
HCT VFR BLD CALC: 26 % — LOW (ref 39–50)
HGB BLD-MCNC: 9.5 G/DL — LOW (ref 13–17)
IMM GRANULOCYTES NFR BLD AUTO: 1.6 % — HIGH (ref 0–1.5)
LYMPHOCYTES # BLD AUTO: 2.57 K/UL — SIGNIFICANT CHANGE UP (ref 1–3.3)
LYMPHOCYTES # BLD AUTO: 25.9 % — SIGNIFICANT CHANGE UP (ref 13–44)
MCHC RBC-ENTMCNC: 29.8 PG — SIGNIFICANT CHANGE UP (ref 27–34)
MCHC RBC-ENTMCNC: 36.5 GM/DL — HIGH (ref 32–36)
MCV RBC AUTO: 81.5 FL — SIGNIFICANT CHANGE UP (ref 80–100)
MONOCYTES # BLD AUTO: 0.89 K/UL — SIGNIFICANT CHANGE UP (ref 0–0.9)
MONOCYTES NFR BLD AUTO: 9 % — SIGNIFICANT CHANGE UP (ref 2–14)
NEUTROPHILS # BLD AUTO: 6.23 K/UL — SIGNIFICANT CHANGE UP (ref 1.8–7.4)
NEUTROPHILS NFR BLD AUTO: 62.8 % — SIGNIFICANT CHANGE UP (ref 43–77)
PLATELET # BLD AUTO: 323 K/UL — SIGNIFICANT CHANGE UP (ref 150–400)
RBC # BLD: 3.19 M/UL — LOW (ref 4.2–5.8)
RBC # FLD: 17.7 % — HIGH (ref 10.3–14.5)
WBC # BLD: 9.92 K/UL — SIGNIFICANT CHANGE UP (ref 3.8–10.5)
WBC # FLD AUTO: 9.92 K/UL — SIGNIFICANT CHANGE UP (ref 3.8–10.5)

## 2022-09-08 PROCEDURE — 70450 CT HEAD/BRAIN W/O DYE: CPT | Mod: 26,MA

## 2022-09-08 PROCEDURE — 99285 EMERGENCY DEPT VISIT HI MDM: CPT

## 2022-09-08 NOTE — ED PROVIDER NOTE - OBJECTIVE STATEMENT
51yom w/ hx of lymphoma brought in by EMS with AMS. He had a recent admission for an episode of AMS and refusing to eat, had multiple electrolyte derangements corrected and returned to normal mental status. Today he apparently was getting agitated and threatening violence with the neighbor. On EMS arrival he was cooperative, answer yes/no questions but not providing history. Pt currently nonverbal on my exam and provides no history. Per chart review pt appears to be on antipsychotics, unknown adherence.

## 2022-09-08 NOTE — ED ADULT NURSE NOTE - OBJECTIVE STATEMENT
pt to ED with complaints of bizarre behavior. pmh brain CA. as per EMS pt went up to neighbor and was acting bizarre. upon assessment pt refusing to speak. pt opens eyes to speech. pt refuses to make eye contact. when asked the patients name he responded with "yes". pt refusing to answer any other questions.

## 2022-09-08 NOTE — ED ADULT TRIAGE NOTE - AS HEIGHT TYPE
[Yes] : Risk of tobacco use and health benefits of smoking cessation discussed: Yes [Cessation strategies including cessation program discussed] : Cessation strategies including cessation program discussed [No] : Not willing to quit smoking stated

## 2022-09-08 NOTE — ED ADULT NURSE NOTE - NURSING NEURO LEVEL OF CONSCIOUSNESS
I spoke to Hermes Edmondson on 9/1/21 and advised of US of abdomen results and referred him to GI and ordered CBC and CMP and he is to also schedule with hematology for elevated hemoglobin  alert and awake

## 2022-09-08 NOTE — ED PROVIDER NOTE - PROGRESS NOTE DETAILS
Guszack: Pt with rapid eye fluttering, non verbal, rapid shallow breathing. Possible seizure? medicated with lorazepam 2mg IV

## 2022-09-08 NOTE — ED PROVIDER NOTE - CLINICAL SUMMARY MEDICAL DECISION MAKING FREE TEXT BOX
Recurring episodes of mental status change, today agitated, in ED episode of possible seizure like activity. Nonfocal neuro exam, no actionable lab abnormalities or obvious toxidrome. CTH with no acute findings. Has history of an unspecified psychiatric disorder and appears to be on antipsychotics, unknown if adherent to the regimen, could be psychotic, however given hx of lymphoma will need to consider structural CNS causes of AMS. Will consult neurology and psychiatry, will defer LP at this time pending speciality consultation, pt has no symptoms suggestive of meningitis.

## 2022-09-08 NOTE — ED ADULT TRIAGE NOTE - CHIEF COMPLAINT QUOTE
BIBEMS from home where pt lives with his wife. PD was called after pt went up to neighbor and was acting bizarre like he was going to hit them.. Normally does not talk that much due to Hx of Brain CA that has metastasized. PT only answering yes or no questions. Denies pain.

## 2022-09-09 DIAGNOSIS — R41.82 ALTERED MENTAL STATUS, UNSPECIFIED: ICD-10-CM

## 2022-09-09 LAB
ALBUMIN SERPL ELPH-MCNC: 3.9 G/DL — SIGNIFICANT CHANGE UP (ref 3.3–5.2)
ALP SERPL-CCNC: 83 U/L — SIGNIFICANT CHANGE UP (ref 40–120)
ALT FLD-CCNC: 50 U/L — HIGH
ANION GAP SERPL CALC-SCNC: 18 MMOL/L — HIGH (ref 5–17)
APAP SERPL-MCNC: <3 UG/ML — LOW (ref 10–26)
AST SERPL-CCNC: 43 U/L — HIGH
B PERT DNA SPEC QL NAA+PROBE: SIGNIFICANT CHANGE UP
BILIRUB SERPL-MCNC: 0.5 MG/DL — SIGNIFICANT CHANGE UP (ref 0.4–2)
BUN SERPL-MCNC: 11.5 MG/DL — SIGNIFICANT CHANGE UP (ref 8–20)
C PNEUM DNA SPEC QL NAA+PROBE: SIGNIFICANT CHANGE UP
CALCIUM SERPL-MCNC: 9.5 MG/DL — SIGNIFICANT CHANGE UP (ref 8.4–10.5)
CHLORIDE SERPL-SCNC: 97 MMOL/L — LOW (ref 98–107)
CO2 SERPL-SCNC: 20 MMOL/L — LOW (ref 22–29)
CREAT SERPL-MCNC: 1.04 MG/DL — SIGNIFICANT CHANGE UP (ref 0.5–1.3)
EGFR: 86 ML/MIN/1.73M2 — SIGNIFICANT CHANGE UP
ETHANOL SERPL-MCNC: <10 MG/DL — SIGNIFICANT CHANGE UP (ref 0–9)
FLUAV H1 2009 PAND RNA SPEC QL NAA+PROBE: SIGNIFICANT CHANGE UP
FLUAV H1 RNA SPEC QL NAA+PROBE: SIGNIFICANT CHANGE UP
FLUAV H3 RNA SPEC QL NAA+PROBE: SIGNIFICANT CHANGE UP
FLUAV SUBTYP SPEC NAA+PROBE: SIGNIFICANT CHANGE UP
FLUBV RNA SPEC QL NAA+PROBE: SIGNIFICANT CHANGE UP
GLUCOSE SERPL-MCNC: 108 MG/DL — HIGH (ref 70–99)
HADV DNA SPEC QL NAA+PROBE: SIGNIFICANT CHANGE UP
HCOV PNL SPEC NAA+PROBE: SIGNIFICANT CHANGE UP
HMPV RNA SPEC QL NAA+PROBE: SIGNIFICANT CHANGE UP
HPIV1 RNA SPEC QL NAA+PROBE: SIGNIFICANT CHANGE UP
HPIV2 RNA SPEC QL NAA+PROBE: SIGNIFICANT CHANGE UP
HPIV3 RNA SPEC QL NAA+PROBE: SIGNIFICANT CHANGE UP
HPIV4 RNA SPEC QL NAA+PROBE: SIGNIFICANT CHANGE UP
MAGNESIUM SERPL-MCNC: 1.6 MG/DL — SIGNIFICANT CHANGE UP (ref 1.6–2.6)
POTASSIUM SERPL-MCNC: 3.8 MMOL/L — SIGNIFICANT CHANGE UP (ref 3.5–5.3)
POTASSIUM SERPL-SCNC: 3.8 MMOL/L — SIGNIFICANT CHANGE UP (ref 3.5–5.3)
PROT SERPL-MCNC: 6.6 G/DL — SIGNIFICANT CHANGE UP (ref 6.6–8.7)
RAPID RVP RESULT: DETECTED
RV+EV RNA SPEC QL NAA+PROBE: SIGNIFICANT CHANGE UP
SALICYLATES SERPL-MCNC: <0.6 MG/DL — LOW (ref 10–20)
SARS-COV-2 RNA SPEC QL NAA+PROBE: DETECTED
SODIUM SERPL-SCNC: 135 MMOL/L — SIGNIFICANT CHANGE UP (ref 135–145)

## 2022-09-09 PROCEDURE — 95819 EEG AWAKE AND ASLEEP: CPT | Mod: 26

## 2022-09-09 PROCEDURE — 99223 1ST HOSP IP/OBS HIGH 75: CPT

## 2022-09-09 PROCEDURE — 12345: CPT | Mod: NC

## 2022-09-09 RX ORDER — MAGNESIUM SULFATE 500 MG/ML
1 VIAL (ML) INJECTION ONCE
Refills: 0 | Status: COMPLETED | OUTPATIENT
Start: 2022-09-09 | End: 2022-09-09

## 2022-09-09 RX ORDER — ONDANSETRON 8 MG/1
4 TABLET, FILM COATED ORAL EVERY 8 HOURS
Refills: 0 | Status: DISCONTINUED | OUTPATIENT
Start: 2022-09-09 | End: 2022-09-20

## 2022-09-09 RX ORDER — LISINOPRIL 2.5 MG/1
10 TABLET ORAL DAILY
Refills: 0 | Status: DISCONTINUED | OUTPATIENT
Start: 2022-09-09 | End: 2022-09-11

## 2022-09-09 RX ORDER — ACETAMINOPHEN 500 MG
650 TABLET ORAL EVERY 6 HOURS
Refills: 0 | Status: DISCONTINUED | OUTPATIENT
Start: 2022-09-09 | End: 2022-09-20

## 2022-09-09 RX ORDER — PANTOPRAZOLE SODIUM 20 MG/1
40 TABLET, DELAYED RELEASE ORAL
Refills: 0 | Status: DISCONTINUED | OUTPATIENT
Start: 2022-09-09 | End: 2022-09-20

## 2022-09-09 RX ORDER — LANOLIN ALCOHOL/MO/W.PET/CERES
3 CREAM (GRAM) TOPICAL AT BEDTIME
Refills: 0 | Status: DISCONTINUED | OUTPATIENT
Start: 2022-09-09 | End: 2022-09-20

## 2022-09-09 RX ORDER — QUETIAPINE FUMARATE 200 MG/1
200 TABLET, FILM COATED ORAL
Refills: 0 | Status: DISCONTINUED | OUTPATIENT
Start: 2022-09-09 | End: 2022-09-20

## 2022-09-09 RX ORDER — SODIUM CHLORIDE 9 MG/ML
3 INJECTION INTRAMUSCULAR; INTRAVENOUS; SUBCUTANEOUS EVERY 8 HOURS
Refills: 0 | Status: DISCONTINUED | OUTPATIENT
Start: 2022-09-09 | End: 2022-09-20

## 2022-09-09 RX ADMIN — SODIUM CHLORIDE 3 MILLILITER(S): 9 INJECTION INTRAMUSCULAR; INTRAVENOUS; SUBCUTANEOUS at 14:00

## 2022-09-09 RX ADMIN — Medication 100 GRAM(S): at 05:56

## 2022-09-09 RX ADMIN — PANTOPRAZOLE SODIUM 40 MILLIGRAM(S): 20 TABLET, DELAYED RELEASE ORAL at 05:56

## 2022-09-09 RX ADMIN — Medication 2 MILLIGRAM(S): at 05:02

## 2022-09-09 RX ADMIN — SODIUM CHLORIDE 3 MILLILITER(S): 9 INJECTION INTRAMUSCULAR; INTRAVENOUS; SUBCUTANEOUS at 05:56

## 2022-09-09 RX ADMIN — SODIUM CHLORIDE 3 MILLILITER(S): 9 INJECTION INTRAMUSCULAR; INTRAVENOUS; SUBCUTANEOUS at 22:39

## 2022-09-09 RX ADMIN — LISINOPRIL 10 MILLIGRAM(S): 2.5 TABLET ORAL at 05:56

## 2022-09-09 RX ADMIN — QUETIAPINE FUMARATE 200 MILLIGRAM(S): 200 TABLET, FILM COATED ORAL at 05:56

## 2022-09-09 RX ADMIN — QUETIAPINE FUMARATE 200 MILLIGRAM(S): 200 TABLET, FILM COATED ORAL at 17:32

## 2022-09-09 NOTE — H&P ADULT - HISTORY OF PRESENT ILLNESS
50 y/o male brought in for agitation, increased confusion from baseline. Patient with hx of breast cancer and lymphoma being treated at [facility] under Dr. Pinto. Also has hx of MDD, HTN, GERD. Patient with recent admission to [facility] for similar issue from 9/2-9/5. He has a  negative infectious w/u, no concern for CVA. Symptoms thought to be related to dehydration and metabolic encephalopathy from chemo. Cultures negative, returned to baseline and was dcd. brought in for above with reports of eye fluttering and blank staring after aggressive behavior. At this time unable to obtain reliable history from Patient, no family present and no answer at EC. Patient currently awake, following commands but with withdrawn affect, delayed responses, and pre-occupied behavior. Does follow simple commands. Unclear what true baseline is. Patient reportedly at [facility] recently for similar episodes but unclear if had EEG/MRI. Vitals here stable, labs grossly unchanged, CT head without acute findings, recent utox neg, RVP pos. for COVID.

## 2022-09-09 NOTE — H&P ADULT - ASSESSMENT
50 y/o male with episodic agitation/impulsive behavior associated with eye fluttering/starring episodes, COVID, MDD, breast cancer/Lymphoma, HTN     Abnormal behavior/AMS  -Psychomotor agitation from MDD?  -COVID related?   -Leptomeningeal involvement from malignancy?  -non-convulsive seizure activity?  -Paraneoplastic related/Chemo related?   -EEG ordered   -Unable to obtain more collateral information as family not available   -Unable to do MRI screen until family available  -BH eval, Neuro eval  -CTH as above, vitals stable  -Fall risk, ambulate with assistance  -DVT-P     COVID:  -Supportive care   -not hypoxic  -isolation    MDD:  -Cont. o/p regimen  - f/u    Breast cancer/lymphoma:  -F/U with Dr. Pinto at [facility]    HTN:  -DASH diet  -Cont. o/p regimen     Discussed with ED staff

## 2022-09-09 NOTE — ED ADULT NURSE REASSESSMENT NOTE - NS ED NURSE REASSESS COMMENT FT1
assumed care for pt at 0730, charting as noted. pt is A&Ox2-3, stating it is the year 2020, but knows he is in the hospital, just doesn't know why. respirations even and unlabored. pt updated on plan of care. pt shows no s/s of acute distress at this time. safety precautions maintained. assumed care for pt at 0730, charting as noted. pt is A&Ox2-3, stating it is the year 2020, but knows he is in the hospital, just doesn't know why. respirations even and unlabored. pupils 2mm, PERRLA. arm and leg strength strong and equal b/l. no upper or lower extremity drift. pt updated on plan of care. pt shows no s/s of acute distress at this time. safety precautions maintained.

## 2022-09-09 NOTE — CONSULT NOTE ADULT - SUBJECTIVE AND OBJECTIVE BOX
[facility]                                     Neurology at [place]                                 [address]av[address], [address]                                  [address]                                        [place], [place], [address]                                             [phone]    CC: AMS  HPI:  The patient is a 51y Male who presented with Confusion.  He says that he was tired and disoriented, but feels better now.  He had recent admission 9/2-9/5  for AMS which was thought to be metabolic in etiology.  Apparently has had multiple similar episodes recently.  Neurology is asked to evaluate.    PAST MEDICAL & SURGICAL HISTORY:  Lymphoma      Breast cancer      Major depression      No significant past surgical history        MEDICATIONS  (STANDING):  lisinopril 10 milliGRAM(s) Oral daily  pantoprazole    Tablet 40 milliGRAM(s) Oral before breakfast  QUEtiapine 200 milliGRAM(s) Oral two times a day  sodium chloride 0.9% lock flush 3 milliLiter(s) IV Push every 8 hours    MEDICATIONS  (PRN):  acetaminophen     Tablet .. 650 milliGRAM(s) Oral every 6 hours PRN Temp greater or equal to 38C (100.4F), Mild Pain (1 - 3)  aluminum hydroxide/magnesium hydroxide/simethicone Suspension 30 milliLiter(s) Oral every 4 hours PRN Dyspepsia  melatonin 3 milliGRAM(s) Oral at bedtime PRN Insomnia  ondansetron Injectable 4 milliGRAM(s) IV Push every 8 hours PRN Nausea and/or Vomiting      Allergies    No Known Allergies    Intolerances      SOCIAL HISTORY:  no tob,   no alcohol   no drugs    FAMILY HISTORY:  Patient unable to provide medical history  no history seizure in either parent          ROS: 14 point ROS negative other than what is present in HPI or below    Vital Signs Last 24 Hrs  T(C): 37 (09 Sep 2022 06:04), Max: 37 (09 Sep 2022 06:04)  T(F): 98.6 (09 Sep 2022 06:04), Max: 98.6 (09 Sep 2022 06:04)  HR: 89 (09 Sep 2022 06:04) (76 - 114)  BP: 116/77 (09 Sep 2022 06:04) (116/77 - 141/84)  BP(mean): 71 (09 Sep 2022 04:12) (71 - 71)  RR: 17 (09 Sep 2022 06:04) (16 - 20)  SpO2: 99% (09 Sep 2022 06:04) (97% - 99%)    Parameters below as of 09 Sep 2022 06:04  Patient On (Oxygen Delivery Method): room air      General: NAD    Detailed Neurologic Exam:    Mental status: The patient is awake and alert and has normal attention span.  The patient is fully oriented in 3 spheres. The patient is oriented to current events. The patient is able to name objects, follow commands, repeat sentences.    Cranial nerves: Pupils equal and react symmetrically to light. There is no visual field deficit to confrontation. Extraocular motion is full with no nystagmus. There is no ptosis. Facial sensation is intact. Facial musculature is symmetric. Palate elevates symmetrically. Tongue is midline.    Motor: There is normal bulk and tone.  There is no tremor.  Strength is 5/5 in the right arm and leg.   Strength is 5/5 in the left arm and leg.    Sensation: Intact to light touch and pin in 4 extremities    Reflexes: 2+ patella b/l and plantar responses are flexor.    Cerebellar: There is no dysmetria on finger to nose testing.    Gait : deferred    LABS:                         9.5    9.92  )-----------( 323      ( 08 Sep 2022 23:28 )             26.0       09-08    135  |  97<L>  |  11.5  ----------------------------<  108<H>  3.8   |  20.0<L>  |  1.04    Ca    9.5      08 Sep 2022 23:28  Mg     1.6     09-08    TPro  6.6  /  Alb  3.9  /  TBili  0.5  /  DBili  x   /  AST  43<H>  /  ALT  50<H>  /  AlkPhos  83  09-08      RADIOLOGY & ADDITIONAL STUDIES (independently reviewed unless otherwise noted):  Head CT 9/8 did not show acute stroke, mass or blood

## 2022-09-09 NOTE — ED ADULT NURSE REASSESSMENT NOTE - NS ED NURSE REASSESS COMMENT FT1
pt is A&Ox3, forgetful at times. pt resting in stretcher, has no complaints at this time. EEG completed. respirations even and unlabored. pt shows no s/s of acute distress at this time. safety precautions maintained. pt is A&Ox3, forgetful at times. pt resting in stretcher, has no complaints at this time. EEG completed. pupils are equal, round, reactive to light and accomodation. arm and leg stength are equal b/l. no drift present in upper or lower extremities. respirations even and unlabored. pt shows no s/s of acute distress at this time. safety precautions maintained.

## 2022-09-09 NOTE — EEG REPORT - NS EEG TEXT BOX
History:    51 year-old man Breast CA, lymphoma, COVID + presenting with depressed mental status.     Pertinent Medication  -  Zofran  Tylenol  Seroquel  Protonix  Ativan  Zestril    Study Interpretation:  Findings: The background was continuous and reactive. During wakefulness, the posterior dominant rhythm consisted of symmetric, well-modulated 10 Hz activity, with amplitude to 50 uV, that attenuated to eye opening.     Background Slowing:  No generalized background slowing was present.    Focal Slowing:   None were present.    Sleep Background:  Drowsiness was characterized by fragmentation, attenuation, and slowing of the background activity.      Other Non-Epileptiform Findings:  None were present.    Interictal Epileptiform Activity:   None were present.    Events:  No event or seizure recorded.    Activation Procedures:   Hyperventilation was not performed.    Photic stimulation was performed and did not elicit any abnormalities.      Artifacts:  Intermittent myogenic and movement artifacts were noted.    ECG:  The heart rate on single channel ECG was predominantly between 70-80 BPM.    EEG Summary / Classification:    Normal EEG in the awake, drowsy states.    EEG Impression / Clinical Correlate:    Normal EEG study.  No epileptiform pattern or seizure seen.    Jessica Noland MD   Epilepsy Fellow, [facility]	      This is a preliminary read   History:    51 year-old man Breast CA, lymphoma, COVID + presenting with depressed mental status.     Pertinent Medication  -  Zofran  Tylenol  Seroquel  Protonix  Ativan  Zestril    Study Interpretation:  Findings: The background was continuous and reactive. During wakefulness, the posterior dominant rhythm consisted of symmetric, well-modulated 10 Hz activity, with amplitude to 50 uV, that attenuated to eye opening.     Background Slowing:  No generalized background slowing was present.    Focal Slowing:   None were present.    Sleep Background:  Drowsiness was characterized by fragmentation, attenuation, and slowing of the background activity.      Other Non-Epileptiform Findings:  None were present.    Interictal Epileptiform Activity:   None were present.    Events:  No event or seizure recorded.    Activation Procedures:   Hyperventilation was not performed.    Photic stimulation was performed and did not elicit any abnormalities.      Artifacts:  Intermittent myogenic and movement artifacts were noted.    ECG:  The heart rate on single channel ECG was predominantly between 70-80 BPM.    EEG Summary / Classification:    Normal EEG in the awake, drowsy states.    EEG Impression / Clinical Correlate:    Normal EEG study.  No epileptiform pattern or seizure seen.    Jessica Noland MD   Epilepsy Fellow, [facility]	    Deuce Mendez MD PhD  Director, Epilepsy Division, [facility] EEG Reading Room Ph#: [phone]  Epilepsy Answering Service after 5PM and before 8:30AM: Ph#: [phone]

## 2022-09-09 NOTE — CONSULT NOTE ADULT - ASSESSMENT
The patient is a 51y Male who is followed by neurology because of AMS    AMS  several episodes recently  now resolved  ?toxic-metabolic    -(+) COVID  agree with EEG  may need eventual MRI brain    will follow with you    Marquez Cunningham MD PhD   [phone]

## 2022-09-09 NOTE — ED ADULT NURSE REASSESSMENT NOTE - NS ED NURSE REASSESS COMMENT FT1
pt is A&Ox3, appears much more alert than this morning. pt is sitting up eating his dinner. pt verbalizes feeling better and "less out of it". pt updated on plan of care. respirations even and unlabored. pt shows no s/s of acute distress at this time. safety precautions maintained.

## 2022-09-10 LAB
AMMONIA BLD-MCNC: 19 UMOL/L — SIGNIFICANT CHANGE UP (ref 11–55)
ANION GAP SERPL CALC-SCNC: 11 MMOL/L — SIGNIFICANT CHANGE UP (ref 5–17)
BUN SERPL-MCNC: 9.7 MG/DL — SIGNIFICANT CHANGE UP (ref 8–20)
CALCIUM SERPL-MCNC: 8.8 MG/DL — SIGNIFICANT CHANGE UP (ref 8.4–10.5)
CHLORIDE SERPL-SCNC: 103 MMOL/L — SIGNIFICANT CHANGE UP (ref 98–107)
CO2 SERPL-SCNC: 23 MMOL/L — SIGNIFICANT CHANGE UP (ref 22–29)
CREAT SERPL-MCNC: 0.9 MG/DL — SIGNIFICANT CHANGE UP (ref 0.5–1.3)
EGFR: 103 ML/MIN/1.73M2 — SIGNIFICANT CHANGE UP
GLUCOSE SERPL-MCNC: 99 MG/DL — SIGNIFICANT CHANGE UP (ref 70–99)
HCT VFR BLD CALC: 26.7 % — LOW (ref 39–50)
HGB BLD-MCNC: 9.5 G/DL — LOW (ref 13–17)
MAGNESIUM SERPL-MCNC: 2.1 MG/DL — SIGNIFICANT CHANGE UP (ref 1.6–2.6)
MCHC RBC-ENTMCNC: 30.1 PG — SIGNIFICANT CHANGE UP (ref 27–34)
MCHC RBC-ENTMCNC: 35.6 GM/DL — SIGNIFICANT CHANGE UP (ref 32–36)
MCV RBC AUTO: 84.5 FL — SIGNIFICANT CHANGE UP (ref 80–100)
PHOSPHATE SERPL-MCNC: 3.1 MG/DL — SIGNIFICANT CHANGE UP (ref 2.4–4.7)
PLATELET # BLD AUTO: 287 K/UL — SIGNIFICANT CHANGE UP (ref 150–400)
POTASSIUM SERPL-MCNC: 4.3 MMOL/L — SIGNIFICANT CHANGE UP (ref 3.5–5.3)
POTASSIUM SERPL-SCNC: 4.3 MMOL/L — SIGNIFICANT CHANGE UP (ref 3.5–5.3)
RBC # BLD: 3.16 M/UL — LOW (ref 4.2–5.8)
RBC # FLD: 18 % — HIGH (ref 10.3–14.5)
SODIUM SERPL-SCNC: 137 MMOL/L — SIGNIFICANT CHANGE UP (ref 135–145)
WBC # BLD: 7.09 K/UL — SIGNIFICANT CHANGE UP (ref 3.8–10.5)
WBC # FLD AUTO: 7.09 K/UL — SIGNIFICANT CHANGE UP (ref 3.8–10.5)

## 2022-09-10 PROCEDURE — 99232 SBSQ HOSP IP/OBS MODERATE 35: CPT

## 2022-09-10 PROCEDURE — 99233 SBSQ HOSP IP/OBS HIGH 50: CPT

## 2022-09-10 RX ORDER — INFLUENZA VIRUS VACCINE 15; 15; 15; 15 UG/.5ML; UG/.5ML; UG/.5ML; UG/.5ML
0.5 SUSPENSION INTRAMUSCULAR ONCE
Refills: 0 | Status: DISCONTINUED | OUTPATIENT
Start: 2022-09-10 | End: 2022-09-20

## 2022-09-10 RX ORDER — CHOLECALCIFEROL (VITAMIN D3) 125 MCG
2000 CAPSULE ORAL DAILY
Refills: 0 | Status: DISCONTINUED | OUTPATIENT
Start: 2022-09-10 | End: 2022-09-20

## 2022-09-10 RX ADMIN — SODIUM CHLORIDE 3 MILLILITER(S): 9 INJECTION INTRAMUSCULAR; INTRAVENOUS; SUBCUTANEOUS at 21:39

## 2022-09-10 RX ADMIN — LISINOPRIL 10 MILLIGRAM(S): 2.5 TABLET ORAL at 05:23

## 2022-09-10 RX ADMIN — Medication 2000 UNIT(S): at 14:35

## 2022-09-10 RX ADMIN — SODIUM CHLORIDE 3 MILLILITER(S): 9 INJECTION INTRAMUSCULAR; INTRAVENOUS; SUBCUTANEOUS at 15:37

## 2022-09-10 RX ADMIN — QUETIAPINE FUMARATE 200 MILLIGRAM(S): 200 TABLET, FILM COATED ORAL at 05:23

## 2022-09-10 RX ADMIN — SODIUM CHLORIDE 3 MILLILITER(S): 9 INJECTION INTRAMUSCULAR; INTRAVENOUS; SUBCUTANEOUS at 05:21

## 2022-09-10 RX ADMIN — PANTOPRAZOLE SODIUM 40 MILLIGRAM(S): 20 TABLET, DELAYED RELEASE ORAL at 05:23

## 2022-09-10 NOTE — CHART NOTE - NSCHARTNOTEFT_GEN_A_CORE
Patient was seen at [address] [place] [address] for a psych eval. However, he states he is tired and did not feel like talking.  He was observed yawning  frequently and did not answer question related to his refusal of the Seroquel this morning. Per record, he is currently on Seroquel 200mg PO BID. His nurse states he has refused the morning dose for unknown reasons. Continuos monitoring in place.    Vital Signs Last 24 Hrs  T(C): 36.8 (10 Sep 2022 12:03), Max: 36.8 (10 Sep 2022 04:53)  T(F): 98.3 (10 Sep 2022 12:03), Max: 98.3 (10 Sep 2022 04:53)  HR: 101 (10 Sep 2022 12:03) (93 - 101)  BP: 131/94 (10 Sep 2022 12:03) (113/71 - 131/94)  BP(mean): --  RR: 18 (10 Sep 2022 12:03) (18 - 18)  SpO2: 98% (10 Sep 2022 12:03) (98% - 99%)    Parameters below as of 10 Sep 2022 12:03  Patient On (Oxygen Delivery Method): room air

## 2022-09-10 NOTE — PATIENT PROFILE ADULT - FALL HARM RISK - UNIVERSAL INTERVENTIONS
Bed in lowest position, wheels locked, appropriate side rails in place/Call bell, personal items and telephone in reach/Instruct patient to call for assistance before getting out of bed or chair/Non-slip footwear when patient is out of bed/[place] to call system/Physically safe environment - no spills, clutter or unnecessary equipment/Purposeful Proactive Rounding/Room/bathroom lighting operational, light cord in reach

## 2022-09-10 NOTE — PATIENT PROFILE ADULT - ARE SIGNIFICANT INDICATORS COMPLETE.
Subjective   Daija Cleary is a 31 y.o. male.     Vomiting    This is a new problem. The current episode started in the past 7 days (2 days ago). Episode frequency: 1-2 times daily; 1 episode related to coughing. Progression since onset: No vomiting today. The maximum temperature recorded prior to his arrival was 101 - 101.9 F. Associated symptoms include coughing, diarrhea (twice already this morning; 4-5 times yesterday;  No blood), a fever (100-101 over past 3 days) and headaches.   Cough   This is a new problem. The current episode started in the past 7 days (6 days ago). The problem has been gradually worsening. Cough characteristics: States cough is productive; but has not looked at sputum. Associated symptoms include a fever (100-101 over past 3 days), headaches, nasal congestion and postnasal drip. Associated symptoms comments: Back pain. Treatments tried: Cough drops; ibuprofen. The treatment provided no relief. His past medical history is significant for COPD.   Patient has taken Ibuprofen about 1 1/2 hours prior to office visit today     The following portions of the patient's history were reviewed and updated as appropriate: allergies, current medications, past family history, past medical history, past social history, past surgical history and problem list.    Review of Systems   Constitutional: Positive for fever (100-101 over past 3 days).   HENT: Positive for congestion and postnasal drip. Negative for sinus pressure.    Eyes: Negative.    Respiratory: Positive for cough.    Gastrointestinal: Positive for diarrhea (twice already this morning; 4-5 times yesterday;  No blood), nausea and vomiting.   Musculoskeletal: Positive for back pain.   Neurological: Positive for headaches.       Objective   Physical Exam   Constitutional: He appears well-developed and well-nourished. He appears ill (mild; like he doesn't feel well). No distress.   HENT:   Right Ear: External ear normal. Tympanic membrane is  scarred. Tympanic membrane is not erythematous.   Left Ear: External ear normal. Tympanic membrane is scarred. Tympanic membrane is not erythematous.   Nose: Right sinus exhibits no maxillary sinus tenderness and no frontal sinus tenderness. Left sinus exhibits no maxillary sinus tenderness and no frontal sinus tenderness.   Mouth/Throat: Oropharynx is clear and moist. No oropharyngeal exudate or posterior oropharyngeal erythema.   Neck: Neck supple.   Cardiovascular: Normal rate, regular rhythm and normal heart sounds.  Exam reveals no gallop and no friction rub.    No murmur heard.  Pulmonary/Chest: Effort normal. No respiratory distress. He has decreased breath sounds in the right lower field and the left lower field. He has no wheezes. He has no rales. He exhibits no tenderness.   Back pain to right upper back   Abdominal: Soft. Bowel sounds are increased. There is tenderness in the right lower quadrant. There is guarding.   Lymphadenopathy:     He has no cervical adenopathy.   Neurological: He is alert.   Skin: Skin is warm and dry. He is not diaphoretic.   Psychiatric: He has a normal mood and affect. His behavior is normal.       Assessment/Plan   Daija was seen today for vomiting.    Diagnoses and all orders for this visit:    Right lower quadrant abdominal pain    Referral of patient      Referred to higher level of care for further evaluation.  Patient agreed to seek treatment with ER.           Yes

## 2022-09-11 LAB
ALBUMIN SERPL ELPH-MCNC: 3.2 G/DL — LOW (ref 3.3–5.2)
ALP SERPL-CCNC: 74 U/L — SIGNIFICANT CHANGE UP (ref 40–120)
ALT FLD-CCNC: 31 U/L — SIGNIFICANT CHANGE UP
ANION GAP SERPL CALC-SCNC: 13 MMOL/L — SIGNIFICANT CHANGE UP (ref 5–17)
AST SERPL-CCNC: 22 U/L — SIGNIFICANT CHANGE UP
BILIRUB SERPL-MCNC: 0.5 MG/DL — SIGNIFICANT CHANGE UP (ref 0.4–2)
BUN SERPL-MCNC: 7.8 MG/DL — LOW (ref 8–20)
CALCIUM SERPL-MCNC: 9 MG/DL — SIGNIFICANT CHANGE UP (ref 8.4–10.5)
CHLORIDE SERPL-SCNC: 102 MMOL/L — SIGNIFICANT CHANGE UP (ref 98–107)
CO2 SERPL-SCNC: 22 MMOL/L — SIGNIFICANT CHANGE UP (ref 22–29)
CREAT SERPL-MCNC: 0.93 MG/DL — SIGNIFICANT CHANGE UP (ref 0.5–1.3)
EGFR: 99 ML/MIN/1.73M2 — SIGNIFICANT CHANGE UP
FOLATE SERPL-MCNC: 15.8 NG/ML — SIGNIFICANT CHANGE UP
GLUCOSE SERPL-MCNC: 111 MG/DL — HIGH (ref 70–99)
HCT VFR BLD CALC: 27.1 % — LOW (ref 39–50)
HGB BLD-MCNC: 9.6 G/DL — LOW (ref 13–17)
MCHC RBC-ENTMCNC: 29.2 PG — SIGNIFICANT CHANGE UP (ref 27–34)
MCHC RBC-ENTMCNC: 35.4 GM/DL — SIGNIFICANT CHANGE UP (ref 32–36)
MCV RBC AUTO: 82.4 FL — SIGNIFICANT CHANGE UP (ref 80–100)
PLATELET # BLD AUTO: 303 K/UL — SIGNIFICANT CHANGE UP (ref 150–400)
POTASSIUM SERPL-MCNC: 3.9 MMOL/L — SIGNIFICANT CHANGE UP (ref 3.5–5.3)
POTASSIUM SERPL-SCNC: 3.9 MMOL/L — SIGNIFICANT CHANGE UP (ref 3.5–5.3)
PROT SERPL-MCNC: 6.3 G/DL — LOW (ref 6.6–8.7)
RBC # BLD: 3.29 M/UL — LOW (ref 4.2–5.8)
RBC # FLD: 17.6 % — HIGH (ref 10.3–14.5)
SODIUM SERPL-SCNC: 137 MMOL/L — SIGNIFICANT CHANGE UP (ref 135–145)
TSH SERPL-MCNC: 1.37 UIU/ML — SIGNIFICANT CHANGE UP (ref 0.27–4.2)
VIT B12 SERPL-MCNC: 1022 PG/ML — SIGNIFICANT CHANGE UP (ref 232–1245)
WBC # BLD: 6.98 K/UL — SIGNIFICANT CHANGE UP (ref 3.8–10.5)
WBC # FLD AUTO: 6.98 K/UL — SIGNIFICANT CHANGE UP (ref 3.8–10.5)

## 2022-09-11 PROCEDURE — 99232 SBSQ HOSP IP/OBS MODERATE 35: CPT

## 2022-09-11 RX ORDER — ASCORBIC ACID 60 MG
500 TABLET,CHEWABLE ORAL
Refills: 0 | Status: DISCONTINUED | OUTPATIENT
Start: 2022-09-11 | End: 2022-09-20

## 2022-09-11 RX ORDER — LISINOPRIL 2.5 MG/1
5 TABLET ORAL DAILY
Refills: 0 | Status: DISCONTINUED | OUTPATIENT
Start: 2022-09-11 | End: 2022-09-15

## 2022-09-11 RX ORDER — FERROUS SULFATE 325(65) MG
325 TABLET ORAL DAILY
Refills: 0 | Status: DISCONTINUED | OUTPATIENT
Start: 2022-09-11 | End: 2022-09-20

## 2022-09-11 RX ADMIN — LISINOPRIL 10 MILLIGRAM(S): 2.5 TABLET ORAL at 06:02

## 2022-09-11 RX ADMIN — SODIUM CHLORIDE 3 MILLILITER(S): 9 INJECTION INTRAMUSCULAR; INTRAVENOUS; SUBCUTANEOUS at 06:15

## 2022-09-11 RX ADMIN — QUETIAPINE FUMARATE 200 MILLIGRAM(S): 200 TABLET, FILM COATED ORAL at 17:09

## 2022-09-11 RX ADMIN — Medication 325 MILLIGRAM(S): at 12:34

## 2022-09-11 RX ADMIN — Medication 2000 UNIT(S): at 12:33

## 2022-09-11 RX ADMIN — Medication 500 MILLIGRAM(S): at 17:09

## 2022-09-11 RX ADMIN — SODIUM CHLORIDE 3 MILLILITER(S): 9 INJECTION INTRAMUSCULAR; INTRAVENOUS; SUBCUTANEOUS at 13:55

## 2022-09-11 RX ADMIN — SODIUM CHLORIDE 3 MILLILITER(S): 9 INJECTION INTRAMUSCULAR; INTRAVENOUS; SUBCUTANEOUS at 23:05

## 2022-09-11 RX ADMIN — PANTOPRAZOLE SODIUM 40 MILLIGRAM(S): 20 TABLET, DELAYED RELEASE ORAL at 06:03

## 2022-09-11 RX ADMIN — QUETIAPINE FUMARATE 200 MILLIGRAM(S): 200 TABLET, FILM COATED ORAL at 06:02

## 2022-09-12 LAB
A1C WITH ESTIMATED AVERAGE GLUCOSE RESULT: 4.8 % — SIGNIFICANT CHANGE UP (ref 4–5.6)
ESTIMATED AVERAGE GLUCOSE: 91 MG/DL — SIGNIFICANT CHANGE UP (ref 68–114)

## 2022-09-12 PROCEDURE — 99232 SBSQ HOSP IP/OBS MODERATE 35: CPT

## 2022-09-12 RX ADMIN — Medication 2000 UNIT(S): at 11:12

## 2022-09-12 RX ADMIN — PANTOPRAZOLE SODIUM 40 MILLIGRAM(S): 20 TABLET, DELAYED RELEASE ORAL at 06:06

## 2022-09-12 RX ADMIN — SODIUM CHLORIDE 3 MILLILITER(S): 9 INJECTION INTRAMUSCULAR; INTRAVENOUS; SUBCUTANEOUS at 06:34

## 2022-09-12 RX ADMIN — Medication 500 MILLIGRAM(S): at 06:37

## 2022-09-12 RX ADMIN — Medication 325 MILLIGRAM(S): at 11:12

## 2022-09-12 RX ADMIN — SODIUM CHLORIDE 3 MILLILITER(S): 9 INJECTION INTRAMUSCULAR; INTRAVENOUS; SUBCUTANEOUS at 21:17

## 2022-09-12 RX ADMIN — LISINOPRIL 5 MILLIGRAM(S): 2.5 TABLET ORAL at 06:06

## 2022-09-12 RX ADMIN — SODIUM CHLORIDE 3 MILLILITER(S): 9 INJECTION INTRAMUSCULAR; INTRAVENOUS; SUBCUTANEOUS at 14:00

## 2022-09-12 RX ADMIN — QUETIAPINE FUMARATE 200 MILLIGRAM(S): 200 TABLET, FILM COATED ORAL at 06:06

## 2022-09-13 ENCOUNTER — TRANSCRIPTION ENCOUNTER (OUTPATIENT)
Age: 52
End: 2022-09-13

## 2022-09-13 PROCEDURE — 99223 1ST HOSP IP/OBS HIGH 75: CPT

## 2022-09-13 PROCEDURE — 99232 SBSQ HOSP IP/OBS MODERATE 35: CPT

## 2022-09-13 RX ADMIN — Medication 500 MILLIGRAM(S): at 18:14

## 2022-09-13 RX ADMIN — PANTOPRAZOLE SODIUM 40 MILLIGRAM(S): 20 TABLET, DELAYED RELEASE ORAL at 05:42

## 2022-09-13 RX ADMIN — SODIUM CHLORIDE 3 MILLILITER(S): 9 INJECTION INTRAMUSCULAR; INTRAVENOUS; SUBCUTANEOUS at 12:48

## 2022-09-13 RX ADMIN — QUETIAPINE FUMARATE 200 MILLIGRAM(S): 200 TABLET, FILM COATED ORAL at 18:14

## 2022-09-13 RX ADMIN — Medication 325 MILLIGRAM(S): at 12:44

## 2022-09-13 RX ADMIN — SODIUM CHLORIDE 3 MILLILITER(S): 9 INJECTION INTRAMUSCULAR; INTRAVENOUS; SUBCUTANEOUS at 05:47

## 2022-09-13 RX ADMIN — LISINOPRIL 5 MILLIGRAM(S): 2.5 TABLET ORAL at 05:44

## 2022-09-13 RX ADMIN — QUETIAPINE FUMARATE 200 MILLIGRAM(S): 200 TABLET, FILM COATED ORAL at 05:42

## 2022-09-13 RX ADMIN — SODIUM CHLORIDE 3 MILLILITER(S): 9 INJECTION INTRAMUSCULAR; INTRAVENOUS; SUBCUTANEOUS at 21:24

## 2022-09-13 RX ADMIN — Medication 2000 UNIT(S): at 12:44

## 2022-09-13 RX ADMIN — Medication 500 MILLIGRAM(S): at 05:41

## 2022-09-13 NOTE — BH CONSULTATION LIAISON ASSESSMENT NOTE - HPI (INCLUDE ILLNESS QUALITY, SEVERITY, DURATION, TIMING, CONTEXT, MODIFYING FACTORS, ASSOCIATED SIGNS AND SYMPTOMS)
Patient is a 51 year old male who resides with his wife and 11 y/o son, with a past psychiatric history of depression and schizophrenia, following with [facility] and with no history of substance abuse and with a PMH of breast CA / Lymphoma and HTN who was brought in for change in behavior and currently admitted with asymptomatic COVID 19.Patient seen by neurology, CT and EEG done with no acute findings and recommended psych eval.     Patient seen by writer and found to be initially calm but disorganized. Patient with significant thought blocking paranoia and at times appeared to be responding to internal stimuli with a labile affect, one minute crying and the next yelling. Patient stating "you know why I am here, its the angels". Patient denying any AVH but then talks to angels telling him things but could not elaborate. Patient stating over and over "things are not good, im not safe" but would not elaborate. Patient admits to psychiatric history but would not elaborate. Patient would not respond when asked about sleep or appetite and denies any s/h ideation.     Collateral info taken from wife who states patient has history of depression and schizophrenia but has been stable for many years and believes his last psych admission was in 2005 stating he follows regularly with the psychiatrist and receivers his monthly injectable. Wife states several days prior to admission, patient was not acting like himself, was not sleeping, not making sense at times, becoming paranoid while accusing others of doing things to him.     Writer contacted outpt psychiatrist who expressed concern over current presentation and reported current med regimen of Prolixin decanoate 50mg PO Q4 weeks with next dose being due on 9/14/22 and also on Seroquel 200mg PO BID and Wellbutrin SR 200mg PO daily

## 2022-09-13 NOTE — BH CONSULTATION LIAISON ASSESSMENT NOTE - SUMMARY
Patient is a 51 year old male who resides with his wife and 11 y/o son, with a past psychiatric history of depression and schizophrenia, following with [facility] and with no history of substance abuse and with a PMH of breast CA / Lymphoma and HTN who was brought in for change in behavior and currently admitted with asymptomatic COVID 19.Patient seen by neurology, CT and EEG done with no acute findings and recommended psych eval.     Patient seen for evaluation today and found to be disorganized with delusions of paranoia, AH and with a labile affect. Patient appears psychiatrically decompensated and would benefit form inpatient psych admission. Discussed with Primary team and patient medically cleared but recently diagnosed with COVID 19 (asymptomatic ) and will likely require quarantine before being transferred     Recs   -recommend to keep on 1 to 1 observation for elopement risk and impulsivity   -Continue Seroquel 200mg PO BID   -Recommend to order pts schedule dose of Prolixin Decanoate LOPEZ 50mg which is due tomorrow (9/13)   -If medically cleared, recommend 2PC admission to inpt psychiatry   -Will follow

## 2022-09-13 NOTE — BH CONSULTATION LIAISON ASSESSMENT NOTE - CURRENT MEDICATION
MEDICATIONS  (STANDING):  ascorbic acid 500 milliGRAM(s) Oral two times a day  cholecalciferol 2000 Unit(s) Oral daily  ferrous    sulfate 325 milliGRAM(s) Oral daily  influenza   Vaccine 0.5 milliLiter(s) IntraMuscular once  lisinopril 5 milliGRAM(s) Oral daily  pantoprazole    Tablet 40 milliGRAM(s) Oral before breakfast  QUEtiapine 200 milliGRAM(s) Oral two times a day  sodium chloride 0.9% lock flush 3 milliLiter(s) IV Push every 8 hours    MEDICATIONS  (PRN):  acetaminophen     Tablet .. 650 milliGRAM(s) Oral every 6 hours PRN Temp greater or equal to 38C (100.4F), Mild Pain (1 - 3)  aluminum hydroxide/magnesium hydroxide/simethicone Suspension 30 milliLiter(s) Oral every 4 hours PRN Dyspepsia  melatonin 3 milliGRAM(s) Oral at bedtime PRN Insomnia  ondansetron Injectable 4 milliGRAM(s) IV Push every 8 hours PRN Nausea and/or Vomiting

## 2022-09-13 NOTE — BH CONSULTATION LIAISON ASSESSMENT NOTE - NSBHCHARTREVIEWVS_PSY_A_CORE FT
Vital Signs Last 24 Hrs  T(C): 36.7 (13 Sep 2022 18:21), Max: 37.2 (13 Sep 2022 04:18)  T(F): 98 (13 Sep 2022 18:21), Max: 99 (13 Sep 2022 04:18)  HR: 100 (13 Sep 2022 18:21) (82 - 100)  BP: 140/88 (13 Sep 2022 18:21) (115/77 - 140/88)  BP(mean): --  RR: 18 (13 Sep 2022 18:21) (18 - 18)  SpO2: 92% (13 Sep 2022 18:21) (92% - 100%)    Parameters below as of 13 Sep 2022 18:21  Patient On (Oxygen Delivery Method): room air

## 2022-09-13 NOTE — BH CONSULTATION LIAISON ASSESSMENT NOTE - NSBHCHARTREVIEWLAB_PSY_A_CORE FT
Basic Metabolic Panel (09.10.22 @ 07:40)    Sodium, Serum: 137 mmol/L    Potassium, Serum: 4.3 mmol/L    Chloride, Serum: 103 mmol/L    Carbon Dioxide, Serum: 23.0 mmol/L    Anion Gap, Serum: 11 mmol/L    Blood Urea Nitrogen, Serum: 9.7 mg/dL    Creatinine, Serum: 0.90 mg/dL    Glucose, Serum: 99 mg/dL    Calcium, Total Serum: 8.8: Prior Reference Range of 8.6 – 10.2 was amended as of 7/26/2022 to 8.4 –  10.5. mg/dL    eGFR: 103: The estimated glomerular filtration rate (eGFR) is calculated using the  2021 CKD-EPI creatinine equation, which does not have a coefficient for  race and is validated in individuals 18 years of age and older (N Engl J  Med 2021; 385:2459-3617). Creatinine-based eGFR may be inaccurate in  various situations including but not limited to extremes of muscle mass,  altered dietary protein intake, or medications that affect renal tubular  creatinine secretion. mL/min/1.73m2

## 2022-09-13 NOTE — BH CONSULTATION LIAISON ASSESSMENT NOTE - NSBHCHARTREVIEWINVESTIGATE_PSY_A_CORE FT
< from: 12 Lead ECG (09.02.22 @ 15:50) >      Ventricular Rate 100 BPM    Atrial Rate 100 BPM    P-R Interval 188 ms    QRS Duration 94 ms    Q-T Interval 354 ms    QTC Calculation(Bazett) 456 ms    P Axis 31 degrees    R Axis 47 degrees    T Axis 9 degrees    Diagnosis Line Normal sinus rhythm  Normal ECG    < end of copied text >

## 2022-09-13 NOTE — DISCHARGE NOTE PROVIDER - NSDCMRMEDTOKEN_GEN_ALL_CORE_FT
buPROPion 200 mg/12 hours (SR) oral tablet, extended release: 1 tab(s) orally once a day  fluPHENAZine 2.5 mg/mL injectable solution: 2 milliliter(s) injectable every 4 weeks  lisinopril 10 mg oral tablet: 1 tab(s) orally once a day  omeprazole 20 mg oral delayed release capsule: 1 cap(s) orally once a day  SEROquel 200 mg oral tablet: 1 tab(s) orally 2 times a day  Zofran 8 mg oral tablet: 1 tab(s) orally 3 times a day, As Needed   acetaminophen 325 mg oral tablet: 2 tab(s) orally every 6 hours, As needed, Temp greater or equal to 38C (100.4F), Mild Pain (1 - 3)  ascorbic acid 500 mg oral tablet: 1 tab(s) orally 2 times a day  carvedilol 3.125 mg oral tablet: 1 tab(s) orally every 12 hours  cholecalciferol oral tablet: 2000 unit(s) orally once a day  ferrous sulfate 325 mg (65 mg elemental iron) oral tablet: 1 tab(s) orally once a day  lisinopril 10 mg oral tablet: 1 tab(s) orally once a day  melatonin 3 mg oral tablet: 1 tab(s) orally once a day (at bedtime), As needed, Insomnia  omeprazole 20 mg oral delayed release capsule: 1 cap(s) orally once a day  SEROquel 200 mg oral tablet: 1 tab(s) orally 2 times a day  Zofran 8 mg oral tablet: 1 tab(s) orally 3 times a day, As Needed   acetaminophen 325 mg oral tablet: 2 tab(s) orally every 6 hours, As needed, Temp greater or equal to 38C (100.4F), Mild Pain (1 - 3)  ascorbic acid 500 mg oral tablet: 1 tab(s) orally 2 times a day  carvedilol 3.125 mg oral tablet: 1 tab(s) orally every 12 hours  cholecalciferol oral tablet: 2000 unit(s) orally once a day  ferrous sulfate 325 mg (65 mg elemental iron) oral tablet: 1 tab(s) orally once a day  lisinopril 10 mg oral tablet: 1 tab(s) orally once a day  melatonin 3 mg oral tablet: 1 tab(s) orally once a day (at bedtime), As needed, Insomnia  omeprazole 20 mg oral delayed release capsule: 1 cap(s) orally once a day  Prolixin Decanoate 25 mg/mL injectable solution: 50 milligram(s) injectable every 4 weeks  SEROquel 200 mg oral tablet: 1 tab(s) orally 2 times a day  Zofran 8 mg oral tablet: 1 tab(s) orally 3 times a day, As Needed

## 2022-09-13 NOTE — DISCHARGE NOTE PROVIDER - PROVIDER TOKENS
FREE:[LAST:[foillow up with your oncologist and cardiologist],PHONE:[(   )    -],FAX:[(   )    -]] PROVIDER:[TOKEN:[[id number]]]

## 2022-09-13 NOTE — DISCHARGE NOTE PROVIDER - EXTENDED VTE YES NO FOR MLM ASPIRIN
[facility] called for an order for diagnostic mammogram for the pt.  Needs [place] faxed to them at [phone]
,

## 2022-09-13 NOTE — DISCHARGE NOTE PROVIDER - HOSPITAL COURSE
50 y/o male brought in for agitation, increased confusion from baseline. Patient with hx of breast cancer and lymphoma being treated at [facility] under Dr. Pinto. Also has hx of MDD, HTN, GERD. Patient with recent admission to [facility] for similar issue from 9/2-9/5. He has a  negative infectious w/u, no concern for CVA. Symptoms thought to be related to dehydration and metabolic encephalopathy from chemo. Cultures negative, returned to baseline and was discharged   he was  brought in for above with reports of eye fluttering and blank staring after aggressive behavior. At this time unable to obtain reliable history from Patient  Per wife he has  been getting on off confused lately and thinking his cardiac medication lisinopril causing it , on admission also found to have COVID positive , no acute symptoms no fever or hypoxia   seen by neurology . Patient reportedly at [facility] recently for similar episodes but unclear if had EEG/MRI. Vitals here stable, labs grossly unchanged, CT head without acute findings, recent utox neg, work up negative no metabolic cause , he is currently awake alert oriented    50 y/o male brought in for agitation, increased confusion from baseline. Patient with hx of breast cancer and lymphoma being treated at [facility] under Dr. Pinto. Also has hx of MDD, HTN, GERD. Patient with recent admission to [facility] for similar issue from 9/2-9/5. He has a  negative infectious w/u, no concern for CVA. Symptoms thought to be related to dehydration and metabolic encephalopathy from chemo. Cultures negative, returned to baseline and was discharged    He was  brought in for above with reports of eye fluttering and blank staring after aggressive behavior. At this time unable to obtain reliable history from Patient, Per wife, has  been getting on off confused lately and thinking his cardiac medication lisinopril causing it, on admission also found to have COVID positive, no acute symptoms no fever or hypoxia   seen by neurology . Patient reportedly at [facility] recently for similar episodes but unclear if had EEG/MRI. Vitals here stable, labs grossly unchanged, CT head without acute findings, recent utox neg, work up negative no metabolic cause , he is currently awake alert oriented     Patient seen by inpatient  who found patient to be disorganized in speech and paranoid delusions. He was ruled out for organic causes. He is now for inpatient psychiatric admission.   52 y/o male brought in for agitation, increased confusion from baseline. Patient with hx of breast cancer and lymphoma being treated at [facility] under Dr. Pinto. Also has hx of MDD, HTN, GERD. Patient with recent admission to [facility] for similar issue from 9/2-9/5. He has a negative infectious w/u, no concern for CVA. On that prior admission it was thought to be related to dehydration and metabolic encephalopathy from chemo. Cultures negative, returned to baseline and was discharged.    He was  brought back on 9/9 for reports of eye fluttering and blank staring after aggressive behavior. Per wife he has  been getting on off confused lately and thinking his cardiac medication lisinopril causing it, on admission also found to have COVID positive, no acute symptoms no fever or hypoxia. He was seen by neurology on this admission. Patient reportedly at [facility] recently for similar episodes but unclear if had EEG/MRI. Neurology felt this was not a primary neurological event. Vitals here stable, labs grossly unchanged, CT head without acute findings, recent utox neg, work up negative no metabolic cause , he is currently awake and alert    Patient seen by inpatient  who found patient to be disorganized in speech and paranoid delusions. He was ruled out for organic causes. He is now for inpatient psychiatric admission.

## 2022-09-13 NOTE — DISCHARGE NOTE PROVIDER - CARE PROVIDER_API CALL
foillow up with your oncologist and cardiologist,   Phone: (   )    -  Fax: (   )    -  Follow Up Time:    LARS DOMINGUEZ  Internal Medicine  [place] [address]  Phone: [phone]  Fax: [phone]  Follow Up Time:

## 2022-09-13 NOTE — BH CONSULTATION LIAISON ASSESSMENT NOTE - OTHER PAST PSYCHIATRIC HISTORY (INCLUDE DETAILS REGARDING ONSET, COURSE OF ILLNESS, INPATIENT/OUTPATIENT TREATMENT)
see HPI   h/o schizophrenia and follows with Dr Arreola at [facility] and on Prolixin decanoate 50mg PO Q4 weeks with next dose being due on 9/14/22 and also on seroquel 200mg PO BID and Wellbutrin SR 200mg PO daily

## 2022-09-13 NOTE — DISCHARGE NOTE PROVIDER - ATTENDING DISCHARGE PHYSICAL EXAMINATION:
PHYSICAL EXAM:    General: in no acute distress  Eyes: PERRLA, EOMI; conjunctiva and sclera clear  Head: Normocephalic; atraumatic  ENMT: No nasal discharge; airway clear  Neck: Supple; non tender; no masses  Respiratory: No wheezes, rales or rhonchi  Cardiovascular: Regular rate and rhythm. S1 and S2 Normal; No murmurs, gallops or rubs  Gastrointestinal: Soft non-tender non-distended; Normal bowel sounds  Genitourinary: No costovertebral angle tenderness  Extremities: Normal range of motion, No clubbing, cyanosis or edema  Vascular: Peripheral pulses palpable 2+ bilaterally  Neurological: Alert but unable to answer questions for orientation; internally preoccupied  Skin: Warm and dry. No acute rash  Psychiatric: Cooperative and appropriate PHYSICAL EXAM:  General: in no acute distress  Head: Normocephalic; atraumatic  Respiratory: No wheezes, rales or rhonchi  Cardiovascular: Regular rate and rhythm. S1 and S2 Normal; No murmurs, gallops or rubs  Gastrointestinal: Soft non-tender non-distended; Normal bowel sounds  Extremities: Normal range of motion, No clubbing, cyanosis or edema  Vascular: Peripheral pulses palpable 2+ bilaterally  Neurological: Alert but unable to answer questions for orientation; internally preoccupied  Psychiatric: Cooperative and appropriate

## 2022-09-13 NOTE — DISCHARGE NOTE PROVIDER - NSDCCPCAREPLAN_GEN_ALL_CORE_FT
PRINCIPAL DISCHARGE DIAGNOSIS  Diagnosis: Change in mental status  Assessment and Plan of Treatment: stable      SECONDARY DISCHARGE DIAGNOSES  Diagnosis: 2019 novel coronavirus disease (COVID-19)  Assessment and Plan of Treatment:     Diagnosis: History of breast cancer  Assessment and Plan of Treatment:     Diagnosis: Schizophrenia, schizo-affective  Assessment and Plan of Treatment:      PRINCIPAL DISCHARGE DIAGNOSIS  Diagnosis: Schizophrenia, schizo-affective  Assessment and Plan of Treatment: continue with seroquel BID and fluphenazine decanoate every 4 weeks.      SECONDARY DISCHARGE DIAGNOSES  Diagnosis: History of breast cancer  Assessment and Plan of Treatment: f/u with Dr. Pinto    Diagnosis: 2019 novel coronavirus disease (COVID-19)  Assessment and Plan of Treatment: complete quarantine

## 2022-09-14 LAB
ANION GAP SERPL CALC-SCNC: 12 MMOL/L — SIGNIFICANT CHANGE UP (ref 5–17)
BUN SERPL-MCNC: 14.5 MG/DL — SIGNIFICANT CHANGE UP (ref 8–20)
CALCIUM SERPL-MCNC: 9.4 MG/DL — SIGNIFICANT CHANGE UP (ref 8.4–10.5)
CHLORIDE SERPL-SCNC: 100 MMOL/L — SIGNIFICANT CHANGE UP (ref 98–107)
CO2 SERPL-SCNC: 24 MMOL/L — SIGNIFICANT CHANGE UP (ref 22–29)
CREAT SERPL-MCNC: 1.03 MG/DL — SIGNIFICANT CHANGE UP (ref 0.5–1.3)
EGFR: 87 ML/MIN/1.73M2 — SIGNIFICANT CHANGE UP
GLUCOSE SERPL-MCNC: 92 MG/DL — SIGNIFICANT CHANGE UP (ref 70–99)
HCT VFR BLD CALC: 29.3 % — LOW (ref 39–50)
HGB BLD-MCNC: 10.6 G/DL — LOW (ref 13–17)
MAGNESIUM SERPL-MCNC: 1.9 MG/DL — SIGNIFICANT CHANGE UP (ref 1.6–2.6)
MCHC RBC-ENTMCNC: 29.9 PG — SIGNIFICANT CHANGE UP (ref 27–34)
MCHC RBC-ENTMCNC: 36.2 GM/DL — HIGH (ref 32–36)
MCV RBC AUTO: 82.8 FL — SIGNIFICANT CHANGE UP (ref 80–100)
PHOSPHATE SERPL-MCNC: 3 MG/DL — SIGNIFICANT CHANGE UP (ref 2.4–4.7)
PLATELET # BLD AUTO: 309 K/UL — SIGNIFICANT CHANGE UP (ref 150–400)
POTASSIUM SERPL-MCNC: 4.4 MMOL/L — SIGNIFICANT CHANGE UP (ref 3.5–5.3)
POTASSIUM SERPL-SCNC: 4.4 MMOL/L — SIGNIFICANT CHANGE UP (ref 3.5–5.3)
RBC # BLD: 3.54 M/UL — LOW (ref 4.2–5.8)
RBC # FLD: 17.2 % — HIGH (ref 10.3–14.5)
SODIUM SERPL-SCNC: 135 MMOL/L — SIGNIFICANT CHANGE UP (ref 135–145)
WBC # BLD: 8.73 K/UL — SIGNIFICANT CHANGE UP (ref 3.8–10.5)
WBC # FLD AUTO: 8.73 K/UL — SIGNIFICANT CHANGE UP (ref 3.8–10.5)

## 2022-09-14 PROCEDURE — 99233 SBSQ HOSP IP/OBS HIGH 50: CPT

## 2022-09-14 PROCEDURE — 93010 ELECTROCARDIOGRAM REPORT: CPT

## 2022-09-14 PROCEDURE — 99232 SBSQ HOSP IP/OBS MODERATE 35: CPT

## 2022-09-14 RX ORDER — ENOXAPARIN SODIUM 100 MG/ML
40 INJECTION SUBCUTANEOUS EVERY 24 HOURS
Refills: 0 | Status: DISCONTINUED | OUTPATIENT
Start: 2022-09-14 | End: 2022-09-20

## 2022-09-14 RX ORDER — CARVEDILOL PHOSPHATE 80 MG/1
3.12 CAPSULE, EXTENDED RELEASE ORAL EVERY 12 HOURS
Refills: 0 | Status: DISCONTINUED | OUTPATIENT
Start: 2022-09-14 | End: 2022-09-20

## 2022-09-14 RX ORDER — FLUPHENAZINE HYDROCHLORIDE 1 MG/1
50 TABLET, FILM COATED ORAL ONCE
Refills: 0 | Status: COMPLETED | OUTPATIENT
Start: 2022-09-14 | End: 2022-09-14

## 2022-09-14 RX ADMIN — Medication 500 MILLIGRAM(S): at 06:25

## 2022-09-14 RX ADMIN — SODIUM CHLORIDE 3 MILLILITER(S): 9 INJECTION INTRAMUSCULAR; INTRAVENOUS; SUBCUTANEOUS at 21:06

## 2022-09-14 RX ADMIN — QUETIAPINE FUMARATE 200 MILLIGRAM(S): 200 TABLET, FILM COATED ORAL at 06:25

## 2022-09-14 RX ADMIN — Medication 500 MILLIGRAM(S): at 18:50

## 2022-09-14 RX ADMIN — PANTOPRAZOLE SODIUM 40 MILLIGRAM(S): 20 TABLET, DELAYED RELEASE ORAL at 06:25

## 2022-09-14 RX ADMIN — Medication 325 MILLIGRAM(S): at 13:10

## 2022-09-14 RX ADMIN — CARVEDILOL PHOSPHATE 3.12 MILLIGRAM(S): 80 CAPSULE, EXTENDED RELEASE ORAL at 13:10

## 2022-09-14 RX ADMIN — QUETIAPINE FUMARATE 200 MILLIGRAM(S): 200 TABLET, FILM COATED ORAL at 18:50

## 2022-09-14 RX ADMIN — FLUPHENAZINE HYDROCHLORIDE 50 MILLIGRAM(S): 1 TABLET, FILM COATED ORAL at 18:49

## 2022-09-14 RX ADMIN — LISINOPRIL 5 MILLIGRAM(S): 2.5 TABLET ORAL at 06:26

## 2022-09-14 RX ADMIN — SODIUM CHLORIDE 3 MILLILITER(S): 9 INJECTION INTRAMUSCULAR; INTRAVENOUS; SUBCUTANEOUS at 13:24

## 2022-09-14 RX ADMIN — Medication 2000 UNIT(S): at 13:10

## 2022-09-14 RX ADMIN — SODIUM CHLORIDE 3 MILLILITER(S): 9 INJECTION INTRAMUSCULAR; INTRAVENOUS; SUBCUTANEOUS at 06:49

## 2022-09-14 RX ADMIN — CARVEDILOL PHOSPHATE 3.12 MILLIGRAM(S): 80 CAPSULE, EXTENDED RELEASE ORAL at 21:14

## 2022-09-14 NOTE — DIETITIAN INITIAL EVALUATION ADULT - PERTINENT LABORATORY DATA
09-14    135  |  100  |  14.5  ----------------------------<  92  4.4   |  24.0  |  1.03    Ca    9.4      14 Sep 2022 06:25  Phos  3.0     09-14  Mg     1.9     09-14    A1C with Estimated Average Glucose Result: 4.8 % (09-12-22 @ 06:00)

## 2022-09-14 NOTE — CHART NOTE - NSCHARTNOTEFT_GEN_A_CORE
Called by RN when taking AM vitals, pt's  while resting in bed. Repeated it and still in 120s-130s.   Pt seen and evaluated at bedside. Pt denies any symptoms. No palpitations, chest pain, SOB, cough, weakness or abd pain. States he's been told he has h/o of an arrythmia but doesn't know which kind. Pt has not taken any AM meds yet this morning.     Vital Signs  T(F): 97.5 (14 Sep 2022 04:54), Max: 98.5 (13 Sep 2022 11:36)  HR: 125 (14 Sep 2022 04:54) (82 - 125)  BP: 132/88 (14 Sep 2022 04:54) (117/79 - 140/88)  RR: 18 (14 Sep 2022 04:54) (18 - 18)  SpO2: 99% room air (14 Sep 2022 04:54) (92% - 99%)    GENERAL: NAD, sitting up in bed, A&Ox3 to person time and place,   EYES: PERRL, conjunctiva and sclera clear  ENT: Moist mucous membranes  CHEST/LUNG: Unlabored respirations. Clear to auscultation bilaterally; No rales, rhonchi, wheezing, or rubs.   HEART: +S1, S2. Tachycardic, regular rhythm  EXTREMITIES:  2+ Peripheral Pulses, brisk capillary refill  NERVOUS SYSTEM: Latent speech, some disorganized thinking. Answers questions appropriately. No focal neurological deficits   MSK: FROM all 4 extremities, full and equal strength  SKIN: Warm, dry    A/P  Will order STAT EKG and AM labs Called by RN when taking AM vitals, pt's  while resting in bed. Repeated it, took manually, and still in 120s-130s.   Pt seen and evaluated at bedside. Pt denies any symptoms. No palpitations, chest pain, SOB, cough, weakness or abd pain. States he's been told he has h/o of an arrythmia but doesn't know which kind. Pt has not taken any AM meds yet this morning.     Vital Signs  T(F): 97.5 (14 Sep 2022 04:54), Max: 98.5 (13 Sep 2022 11:36)  HR: 125 (14 Sep 2022 04:54) (82 - 125)  BP: 132/88 (14 Sep 2022 04:54) (117/79 - 140/88)  RR: 18 (14 Sep 2022 04:54) (18 - 18)  SpO2: 99% room air (14 Sep 2022 04:54) (92% - 99%)    GENERAL: NAD, sitting up in bed, A&Ox3 to person time and place,   EYES: PERRL, conjunctiva and sclera clear  ENT: Moist mucous membranes  CHEST/LUNG: Unlabored respirations. Clear to auscultation bilaterally; No rales, rhonchi, wheezing, or rubs.   HEART: +S1, S2. Tachycardic, regular rhythm  EXTREMITIES:  2+ Peripheral Pulses, brisk capillary refill  NERVOUS SYSTEM: Latent speech, some disorganized thinking. Answers questions appropriately. No focal neurological deficits   MSK: FROM all 4 extremities, full and equal strength  SKIN: Warm, dry    A/P  - ? h/o arrythmia--> pt poor historian  -STAT EKG--> sinus tachycardia,  bpm   -f/u AM labs  -continue to monitor, RN to contact PA if tachycardia repeats and persists or if any new S/S Called by RN when taking AM vitals, pt's  while resting in bed. Repeated it, took manually, and still in 120s-130s.   Pt seen and evaluated at bedside. Pt denies any symptoms. No palpitations, chest pain, SOB, cough, weakness or abd pain. States he's been told he has h/o of an arrythmia but doesn't know which kind. Pt has not taken any AM meds yet this morning.     Vital Signs  T(F): 97.5 (14 Sep 2022 04:54), Max: 98.5 (13 Sep 2022 11:36)  HR: 125 (14 Sep 2022 04:54) (82 - 125)  BP: 132/88 (14 Sep 2022 04:54) (117/79 - 140/88)  RR: 18 (14 Sep 2022 04:54) (18 - 18)  SpO2: 99% room air (14 Sep 2022 04:54) (92% - 99%)    GENERAL: NAD, sitting up in bed, A&Ox3 to person time and place,   EYES: PERRL, conjunctiva and sclera clear  ENT: Moist mucous membranes  CHEST/LUNG: Unlabored respirations. Clear to auscultation bilaterally; No rales, rhonchi, wheezing, or rubs.   HEART: +S1, S2. Tachycardic, regular rhythm  EXTREMITIES:  2+ Peripheral Pulses, brisk capillary refill  NERVOUS SYSTEM: Latent speech, some disorganized thinking. Answers questions appropriately. No focal neurological deficits   MSK: FROM all 4 extremities, full and equal strength  SKIN: Warm, dry    A/P  - ? h/o arrythmia--> pt poor historian  -STAT EKG--> sinus tachycardia,  bpm, now back to baseline   -continuos pulse ox  -f/u AM labs  -continue to monitor, RN to contact PA if tachycardia repeats and persists or if any new S/S

## 2022-09-14 NOTE — DIETITIAN INITIAL EVALUATION ADULT - ORAL INTAKE PTA/DIET HISTORY
Pt noted poor historian, attempted to interview pt agitated and unable to participate. Per documentation, pt with good po intake consuming 100% of meals.

## 2022-09-15 PROCEDURE — 99232 SBSQ HOSP IP/OBS MODERATE 35: CPT

## 2022-09-15 PROCEDURE — 99233 SBSQ HOSP IP/OBS HIGH 50: CPT

## 2022-09-15 RX ORDER — LISINOPRIL 2.5 MG/1
2.5 TABLET ORAL DAILY
Refills: 0 | Status: DISCONTINUED | OUTPATIENT
Start: 2022-09-16 | End: 2022-09-20

## 2022-09-15 RX ADMIN — QUETIAPINE FUMARATE 200 MILLIGRAM(S): 200 TABLET, FILM COATED ORAL at 18:02

## 2022-09-15 RX ADMIN — CARVEDILOL PHOSPHATE 3.12 MILLIGRAM(S): 80 CAPSULE, EXTENDED RELEASE ORAL at 05:26

## 2022-09-15 RX ADMIN — CARVEDILOL PHOSPHATE 3.12 MILLIGRAM(S): 80 CAPSULE, EXTENDED RELEASE ORAL at 18:02

## 2022-09-15 RX ADMIN — Medication 2000 UNIT(S): at 11:22

## 2022-09-15 RX ADMIN — PANTOPRAZOLE SODIUM 40 MILLIGRAM(S): 20 TABLET, DELAYED RELEASE ORAL at 05:24

## 2022-09-15 RX ADMIN — Medication 325 MILLIGRAM(S): at 11:23

## 2022-09-15 RX ADMIN — ENOXAPARIN SODIUM 40 MILLIGRAM(S): 100 INJECTION SUBCUTANEOUS at 18:02

## 2022-09-15 RX ADMIN — SODIUM CHLORIDE 3 MILLILITER(S): 9 INJECTION INTRAMUSCULAR; INTRAVENOUS; SUBCUTANEOUS at 05:22

## 2022-09-15 RX ADMIN — Medication 500 MILLIGRAM(S): at 05:24

## 2022-09-15 RX ADMIN — Medication 500 MILLIGRAM(S): at 18:02

## 2022-09-15 RX ADMIN — QUETIAPINE FUMARATE 200 MILLIGRAM(S): 200 TABLET, FILM COATED ORAL at 05:24

## 2022-09-15 RX ADMIN — SODIUM CHLORIDE 3 MILLILITER(S): 9 INJECTION INTRAMUSCULAR; INTRAVENOUS; SUBCUTANEOUS at 13:56

## 2022-09-15 RX ADMIN — LISINOPRIL 5 MILLIGRAM(S): 2.5 TABLET ORAL at 05:23

## 2022-09-16 PROCEDURE — 99232 SBSQ HOSP IP/OBS MODERATE 35: CPT

## 2022-09-16 PROCEDURE — 99233 SBSQ HOSP IP/OBS HIGH 50: CPT

## 2022-09-16 RX ADMIN — Medication 500 MILLIGRAM(S): at 16:56

## 2022-09-16 RX ADMIN — Medication 2000 UNIT(S): at 12:07

## 2022-09-16 RX ADMIN — SODIUM CHLORIDE 3 MILLILITER(S): 9 INJECTION INTRAMUSCULAR; INTRAVENOUS; SUBCUTANEOUS at 01:37

## 2022-09-16 RX ADMIN — QUETIAPINE FUMARATE 200 MILLIGRAM(S): 200 TABLET, FILM COATED ORAL at 16:56

## 2022-09-16 RX ADMIN — Medication 325 MILLIGRAM(S): at 12:07

## 2022-09-16 RX ADMIN — SODIUM CHLORIDE 3 MILLILITER(S): 9 INJECTION INTRAMUSCULAR; INTRAVENOUS; SUBCUTANEOUS at 14:09

## 2022-09-16 RX ADMIN — CARVEDILOL PHOSPHATE 3.12 MILLIGRAM(S): 80 CAPSULE, EXTENDED RELEASE ORAL at 16:56

## 2022-09-16 RX ADMIN — SODIUM CHLORIDE 3 MILLILITER(S): 9 INJECTION INTRAMUSCULAR; INTRAVENOUS; SUBCUTANEOUS at 23:10

## 2022-09-16 RX ADMIN — ENOXAPARIN SODIUM 40 MILLIGRAM(S): 100 INJECTION SUBCUTANEOUS at 16:57

## 2022-09-17 PROCEDURE — 99232 SBSQ HOSP IP/OBS MODERATE 35: CPT

## 2022-09-17 RX ADMIN — Medication 325 MILLIGRAM(S): at 11:03

## 2022-09-17 RX ADMIN — Medication 500 MILLIGRAM(S): at 06:53

## 2022-09-17 RX ADMIN — SODIUM CHLORIDE 3 MILLILITER(S): 9 INJECTION INTRAMUSCULAR; INTRAVENOUS; SUBCUTANEOUS at 15:48

## 2022-09-17 RX ADMIN — LISINOPRIL 2.5 MILLIGRAM(S): 2.5 TABLET ORAL at 06:53

## 2022-09-17 RX ADMIN — ENOXAPARIN SODIUM 40 MILLIGRAM(S): 100 INJECTION SUBCUTANEOUS at 17:19

## 2022-09-17 RX ADMIN — PANTOPRAZOLE SODIUM 40 MILLIGRAM(S): 20 TABLET, DELAYED RELEASE ORAL at 06:53

## 2022-09-17 RX ADMIN — CARVEDILOL PHOSPHATE 3.12 MILLIGRAM(S): 80 CAPSULE, EXTENDED RELEASE ORAL at 06:53

## 2022-09-17 RX ADMIN — QUETIAPINE FUMARATE 200 MILLIGRAM(S): 200 TABLET, FILM COATED ORAL at 06:53

## 2022-09-17 RX ADMIN — Medication 2000 UNIT(S): at 11:03

## 2022-09-18 LAB
ANION GAP SERPL CALC-SCNC: 14 MMOL/L — SIGNIFICANT CHANGE UP (ref 5–17)
BUN SERPL-MCNC: 14.9 MG/DL — SIGNIFICANT CHANGE UP (ref 8–20)
CALCIUM SERPL-MCNC: 9.6 MG/DL — SIGNIFICANT CHANGE UP (ref 8.4–10.5)
CHLORIDE SERPL-SCNC: 97 MMOL/L — LOW (ref 98–107)
CO2 SERPL-SCNC: 22 MMOL/L — SIGNIFICANT CHANGE UP (ref 22–29)
CREAT SERPL-MCNC: 0.96 MG/DL — SIGNIFICANT CHANGE UP (ref 0.5–1.3)
EGFR: 95 ML/MIN/1.73M2 — SIGNIFICANT CHANGE UP
GLUCOSE SERPL-MCNC: 85 MG/DL — SIGNIFICANT CHANGE UP (ref 70–99)
HCT VFR BLD CALC: 30.4 % — LOW (ref 39–50)
HGB BLD-MCNC: 11 G/DL — LOW (ref 13–17)
MCHC RBC-ENTMCNC: 30 PG — SIGNIFICANT CHANGE UP (ref 27–34)
MCHC RBC-ENTMCNC: 36.2 GM/DL — HIGH (ref 32–36)
MCV RBC AUTO: 82.8 FL — SIGNIFICANT CHANGE UP (ref 80–100)
PLATELET # BLD AUTO: 336 K/UL — SIGNIFICANT CHANGE UP (ref 150–400)
POTASSIUM SERPL-MCNC: 4.3 MMOL/L — SIGNIFICANT CHANGE UP (ref 3.5–5.3)
POTASSIUM SERPL-SCNC: 4.3 MMOL/L — SIGNIFICANT CHANGE UP (ref 3.5–5.3)
RBC # BLD: 3.67 M/UL — LOW (ref 4.2–5.8)
RBC # FLD: 17.2 % — HIGH (ref 10.3–14.5)
SODIUM SERPL-SCNC: 133 MMOL/L — LOW (ref 135–145)
WBC # BLD: 6.25 K/UL — SIGNIFICANT CHANGE UP (ref 3.8–10.5)
WBC # FLD AUTO: 6.25 K/UL — SIGNIFICANT CHANGE UP (ref 3.8–10.5)

## 2022-09-18 PROCEDURE — 99232 SBSQ HOSP IP/OBS MODERATE 35: CPT

## 2022-09-18 RX ADMIN — Medication 2000 UNIT(S): at 12:43

## 2022-09-18 RX ADMIN — ENOXAPARIN SODIUM 40 MILLIGRAM(S): 100 INJECTION SUBCUTANEOUS at 17:50

## 2022-09-18 RX ADMIN — CARVEDILOL PHOSPHATE 3.12 MILLIGRAM(S): 80 CAPSULE, EXTENDED RELEASE ORAL at 05:25

## 2022-09-18 RX ADMIN — Medication 500 MILLIGRAM(S): at 05:25

## 2022-09-18 RX ADMIN — QUETIAPINE FUMARATE 200 MILLIGRAM(S): 200 TABLET, FILM COATED ORAL at 05:26

## 2022-09-18 RX ADMIN — Medication 500 MILLIGRAM(S): at 17:50

## 2022-09-18 RX ADMIN — LISINOPRIL 2.5 MILLIGRAM(S): 2.5 TABLET ORAL at 05:25

## 2022-09-18 RX ADMIN — Medication 325 MILLIGRAM(S): at 12:43

## 2022-09-18 RX ADMIN — CARVEDILOL PHOSPHATE 3.12 MILLIGRAM(S): 80 CAPSULE, EXTENDED RELEASE ORAL at 17:50

## 2022-09-18 RX ADMIN — PANTOPRAZOLE SODIUM 40 MILLIGRAM(S): 20 TABLET, DELAYED RELEASE ORAL at 05:25

## 2022-09-18 RX ADMIN — QUETIAPINE FUMARATE 200 MILLIGRAM(S): 200 TABLET, FILM COATED ORAL at 17:51

## 2022-09-19 LAB
ANION GAP SERPL CALC-SCNC: 13 MMOL/L — SIGNIFICANT CHANGE UP (ref 5–17)
BUN SERPL-MCNC: 13.6 MG/DL — SIGNIFICANT CHANGE UP (ref 8–20)
CALCIUM SERPL-MCNC: 9.6 MG/DL — SIGNIFICANT CHANGE UP (ref 8.4–10.5)
CHLORIDE SERPL-SCNC: 98 MMOL/L — SIGNIFICANT CHANGE UP (ref 98–107)
CO2 SERPL-SCNC: 24 MMOL/L — SIGNIFICANT CHANGE UP (ref 22–29)
CREAT SERPL-MCNC: 0.88 MG/DL — SIGNIFICANT CHANGE UP (ref 0.5–1.3)
EGFR: 103 ML/MIN/1.73M2 — SIGNIFICANT CHANGE UP
GLUCOSE SERPL-MCNC: 93 MG/DL — SIGNIFICANT CHANGE UP (ref 70–99)
HCT VFR BLD CALC: 32.1 % — LOW (ref 39–50)
HGB BLD-MCNC: 11.5 G/DL — LOW (ref 13–17)
MCHC RBC-ENTMCNC: 29.6 PG — SIGNIFICANT CHANGE UP (ref 27–34)
MCHC RBC-ENTMCNC: 35.8 GM/DL — SIGNIFICANT CHANGE UP (ref 32–36)
MCV RBC AUTO: 82.5 FL — SIGNIFICANT CHANGE UP (ref 80–100)
PLATELET # BLD AUTO: 348 K/UL — SIGNIFICANT CHANGE UP (ref 150–400)
POTASSIUM SERPL-MCNC: 4.1 MMOL/L — SIGNIFICANT CHANGE UP (ref 3.5–5.3)
POTASSIUM SERPL-SCNC: 4.1 MMOL/L — SIGNIFICANT CHANGE UP (ref 3.5–5.3)
RBC # BLD: 3.89 M/UL — LOW (ref 4.2–5.8)
RBC # FLD: 16.7 % — HIGH (ref 10.3–14.5)
SODIUM SERPL-SCNC: 135 MMOL/L — SIGNIFICANT CHANGE UP (ref 135–145)
WBC # BLD: 6.01 K/UL — SIGNIFICANT CHANGE UP (ref 3.8–10.5)
WBC # FLD AUTO: 6.01 K/UL — SIGNIFICANT CHANGE UP (ref 3.8–10.5)

## 2022-09-19 PROCEDURE — 99232 SBSQ HOSP IP/OBS MODERATE 35: CPT

## 2022-09-19 PROCEDURE — 99233 SBSQ HOSP IP/OBS HIGH 50: CPT

## 2022-09-19 RX ADMIN — QUETIAPINE FUMARATE 200 MILLIGRAM(S): 200 TABLET, FILM COATED ORAL at 06:15

## 2022-09-19 RX ADMIN — CARVEDILOL PHOSPHATE 3.12 MILLIGRAM(S): 80 CAPSULE, EXTENDED RELEASE ORAL at 06:15

## 2022-09-19 RX ADMIN — Medication 2000 UNIT(S): at 11:45

## 2022-09-19 RX ADMIN — LISINOPRIL 2.5 MILLIGRAM(S): 2.5 TABLET ORAL at 06:15

## 2022-09-19 RX ADMIN — Medication 500 MILLIGRAM(S): at 18:17

## 2022-09-19 RX ADMIN — Medication 325 MILLIGRAM(S): at 11:45

## 2022-09-19 RX ADMIN — Medication 500 MILLIGRAM(S): at 06:15

## 2022-09-19 RX ADMIN — ENOXAPARIN SODIUM 40 MILLIGRAM(S): 100 INJECTION SUBCUTANEOUS at 18:18

## 2022-09-19 RX ADMIN — CARVEDILOL PHOSPHATE 3.12 MILLIGRAM(S): 80 CAPSULE, EXTENDED RELEASE ORAL at 18:18

## 2022-09-19 RX ADMIN — QUETIAPINE FUMARATE 200 MILLIGRAM(S): 200 TABLET, FILM COATED ORAL at 18:17

## 2022-09-19 RX ADMIN — PANTOPRAZOLE SODIUM 40 MILLIGRAM(S): 20 TABLET, DELAYED RELEASE ORAL at 06:16

## 2022-09-19 NOTE — PROGRESS NOTE ADULT - SUBJECTIVE AND OBJECTIVE BOX
follow up for AMS/COVID infection   pt is on 1:1 ,.calmer still delusional      follow up appreciated       MEDICATIONS  (STANDING):  ascorbic acid 500 milliGRAM(s) Oral two times a day  carvedilol 3.125 milliGRAM(s) Oral every 12 hours  cholecalciferol 2000 Unit(s) Oral daily  enoxaparin Injectable 40 milliGRAM(s) SubCutaneous every 24 hours  ferrous    sulfate 325 milliGRAM(s) Oral daily  influenza   Vaccine 0.5 milliLiter(s) IntraMuscular once  lisinopril 5 milliGRAM(s) Oral daily  pantoprazole    Tablet 40 milliGRAM(s) Oral before breakfast  QUEtiapine 200 milliGRAM(s) Oral two times a day  sodium chloride 0.9% lock flush 3 milliLiter(s) IV Push every 8 hours      Vital Signs Last 24 Hrs  T(C): 36.7 (15 Sep 2022 04:46), Max: 37.3 (14 Sep 2022 18:10)  T(F): 98 (15 Sep 2022 04:46), Max: 99.1 (14 Sep 2022 18:10)  HR: 90 (15 Sep 2022 04:46) (90 - 94)  BP: 97/61 (15 Sep 2022 04:46) (97/61 - 115/68)  BP(mean): --  RR: 18 (15 Sep 2022 04:46) (18 - 19)  SpO2: 97% (15 Sep 2022 04:46) (97% - 99%)    Parameters below as of 15 Sep 2022 04:46  Patient On (Oxygen Delivery Method): room air        CONSTITUTIONAL: NAD, awake  alert oriented   RESPIRATORY: lungs are clear to auscultation bilaterally  CARDIOVASCULAR: Regular rate and rhythm, normal S1 and S2  ABDOMEN: Nontender to palpation, normoactive bowel sounds  Ext : no pretibial edema                             10.6   8.73  )-----------( 309      ( 14 Sep 2022 06:25 )             29.3   09-14    135  |  100  |  14.5  ----------------------------<  92  4.4   |  24.0  |  1.03    Ca    9.4      14 Sep 2022 06:25  Phos  3.0     09-14  Mg     1.9     09-14                                        
Chief complaint: Confusion    Patient seen and examined at bedside. No acute overnight events reported. No fever, chills, cough, chest pain, nausea or vomiting.    Vital Signs Last 24 Hrs  T(F): 97.6 (18 Sep 2022 11:48), Max: 98.4 (18 Sep 2022 05:03)  HR: 95 (18 Sep 2022 11:48) (87 - 100)  BP: 106/71 (18 Sep 2022 11:48) (106/71 - 119/83)  RR: 19 (18 Sep 2022 11:48) (18 - 19)  SpO2: 100% (18 Sep 2022 11:48) (98% - 100%)    Physical Exam:  Constitutional: alert and oriented, in no acute distress   Neck: Soft and supple  Respiratory: Clear to auscultation bilaterally, no wheezes or crackles  Cardiovascular: Regular rate and rhyhtm  Gastrointestinal: Soft, non-tender to palpation, +bs  Vascular: 2+ peripheral pulses  Musculoskeletal: 5/5 strength b/l upper and lower extremities, no lower extremity edema bilaterally     Labs:                        11.0   6.25  )-----------( 336      ( 18 Sep 2022 04:53 )             30.4   09-18    133<L>  |  97<L>  |  14.9  ----------------------------<  85  4.3   |  22.0  |  0.96    Ca    9.6      18 Sep 2022 04:53    
follow up for AMS/COVID infection     pt is on 1:1 ,seen in am   he is nonverbal answered once mya   staring at me , no answer awake   had no breakfast this am and refused taking his medication       MEDICATIONS  (STANDING):  ascorbic acid 500 milliGRAM(s) Oral two times a day  carvedilol 3.125 milliGRAM(s) Oral every 12 hours  cholecalciferol 2000 Unit(s) Oral daily  enoxaparin Injectable 40 milliGRAM(s) SubCutaneous every 24 hours  ferrous    sulfate 325 milliGRAM(s) Oral daily  influenza   Vaccine 0.5 milliLiter(s) IntraMuscular once  lisinopril 2.5 milliGRAM(s) Oral daily  pantoprazole    Tablet 40 milliGRAM(s) Oral before breakfast  QUEtiapine 200 milliGRAM(s) Oral two times a day  sodium chloride 0.9% lock flush 3 milliLiter(s) IV Push every 8 hours    Vital Signs Last 24 Hrs  T(C): 37.1 (16 Sep 2022 12:07), Max: 37.1 (16 Sep 2022 12:07)  T(F): 98.7 (16 Sep 2022 12:07), Max: 98.7 (16 Sep 2022 12:07)  HR: 112 (16 Sep 2022 12:07) (62 - 112)  BP: 131/79 (16 Sep 2022 12:07) (114/73 - 131/79)  BP(mean): --  RR: 19 (16 Sep 2022 12:07) (18 - 19)  SpO2: 100% (16 Sep 2022 04:56) (98% - 100%)    Parameters below as of 16 Sep 2022 12:07  Patient On (Oxygen Delivery Method): room air    General : awake  , flat  affect   Neck : supple , no JVD   Lungs : CTa bilateral   ABDOMEN: Nontender to palpation, normoactive bowel sounds  Ext : no pretibial edema                               
                             [facility]                                     Neu[facility] at [place]                                 Octavio Chandler & Blair                                  [address]                                        [place], [address]                                             [phone]    CC: AMS  HPI:  The patient is a 51y Male who presented with Confusion.  He says that he was tired and disoriented, but feels better now.  He had recent admission 9/2-9/5  for AMS which was thought to be metabolic in etiology.  Apparently has had multiple similar episodes recently.  Neurology is asked to evaluate.    Interval history: feeling a bit better, denies confusion    Review of systems (neurology): Denies headache or dizziness. Denies weakness/numbness.  Denies speech/language deficits. Denies diplopia/blurred vision.  Denies confusion    MEDICATIONS  (STANDING):  influenza   Vaccine 0.5 milliLiter(s) IntraMuscular once  lisinopril 10 milliGRAM(s) Oral daily  pantoprazole    Tablet 40 milliGRAM(s) Oral before breakfast  QUEtiapine 200 milliGRAM(s) Oral two times a day  sodium chloride 0.9% lock flush 3 milliLiter(s) IV Push every 8 hours    MEDICATIONS  (PRN):  acetaminophen     Tablet .. 650 milliGRAM(s) Oral every 6 hours PRN Temp greater or equal to 38C (100.4F), Mild Pain (1 - 3)  aluminum hydroxide/magnesium hydroxide/simethicone Suspension 30 milliLiter(s) Oral every 4 hours PRN Dyspepsia  melatonin 3 milliGRAM(s) Oral at bedtime PRN Insomnia  ondansetron Injectable 4 milliGRAM(s) IV Push every 8 hours PRN Nausea and/or Vomiting      Vital Signs Last 24 Hrs  T(C): 36.8 (10 Sep 2022 04:53), Max: 37.1 (09 Sep 2022 18:30)  T(F): 98.3 (10 Sep 2022 04:53), Max: 98.7 (09 Sep 2022 18:30)  HR: 93 (10 Sep 2022 04:53) (93 - 103)  BP: 113/71 (10 Sep 2022 04:53) (98/61 - 113/71)  BP(mean): --  RR: 18 (10 Sep 2022 04:53) (18 - 18)  SpO2: 99% (10 Sep 2022 04:53) (98% - 99%)    Parameters below as of 10 Sep 2022 04:53  Patient On (Oxygen Delivery Method): room air        Detailed Neurologic Exam:    Mental status: The patient is awake and alert and has normal attention span.  The patient is fully oriented in 3 spheres. The patient is oriented to current events. The patient is able to name objects, follow commands, repeat sentences.    Cranial nerves: Pupils equal and react symmetrically to light. There is no visual field deficit to confrontation. Extraocular motion is full with no nystagmus. There is no ptosis. Facial sensation is intact. Facial musculature is symmetric. Palate elevates symmetrically. Tongue is midline.    Motor: There is normal bulk and tone.  There is no tremor.  Strength is 5/5 in the right arm and leg.   Strength is 5/5 in the left arm and leg.    Sensation: Intact to light touch and pin in 4 extremities    Reflexes: 2+ patella b/l and plantar responses are flexor.    Cerebellar: There is no dysmetria on finger to nose testing.    Gait : deferred    LABS:                                    9.5    7.09  )-----------( 287      ( 10 Sep 2022 07:40 )             26.7     09-10    137  |  103  |  9.7  ----------------------------<  99  4.3   |  23.0  |  0.90    Ca    8.8      10 Sep 2022 07:40  Phos  3.1     09-10  Mg     2.1     09-10    TPro  6.6  /  Alb  3.9  /  TBili  0.5  /  DBili  x   /  AST  43<H>  /  ALT  50<H>  /  AlkPhos  83  09-08    LIVER FUNCTIONS - ( 08 Sep 2022 23:28 )  Alb: 3.9 g/dL / Pro: 6.6 g/dL / ALK PHOS: 83 U/L / ALT: 50 U/L / AST: 43 U/L / GGT: x           EEG Summary / Classification: 9/9/22    Normal EEG in the awake, drowsy states.    EEG Impression / Clinical Correlate:    Normal EEG study.  No epileptiform pattern or seizure seen.      RADIOLOGY & ADDITIONAL STUDIES (independently reviewed unless otherwise noted):  Head CT 9/8 did not show acute stroke, mass or blood
  follow up for AMS/COVID   pt is seen in am , feels  well, slightly disorganized thoughts   denies anxiety , no agitation     d/w  team to evaluate   d/w wife   she is concerned  about lisinopril and states that lisinopril may cause confusion he has been having more lately      MEDICATIONS  (STANDING):  ascorbic acid 500 milliGRAM(s) Oral two times a day  cholecalciferol 2000 Unit(s) Oral daily  ferrous    sulfate 325 milliGRAM(s) Oral daily  influenza   Vaccine 0.5 milliLiter(s) IntraMuscular once  lisinopril 5 milliGRAM(s) Oral daily  pantoprazole    Tablet 40 milliGRAM(s) Oral before breakfast  QUEtiapine 200 milliGRAM(s) Oral two times a day  sodium chloride 0.9% lock flush 3 milliLiter(s) IV Push every 8 hours      Vital Signs Last 24 Hrs  T(C): 36.9 (13 Sep 2022 11:36), Max: 37.2 (13 Sep 2022 04:18)  T(F): 98.5 (13 Sep 2022 11:36), Max: 99 (13 Sep 2022 04:18)  HR: 82 (13 Sep 2022 11:36) (82 - 100)  BP: 117/79 (13 Sep 2022 11:36) (115/77 - 127/73)  BP(mean): --  RR: 18 (13 Sep 2022 11:36) (18 - 18)  SpO2: 100% (13 Sep 2022 04:18) (100% - 100%)    Parameters below as of 13 Sep 2022 04:18  Patient On (Oxygen Delivery Method): room air      CONSTITUTIONAL: NAD, awake  alert oriented   RESPIRATORY: lungs are clear to auscultation bilaterally  CARDIOVASCULAR: Regular rate and rhythm, normal S1 and S2  ABDOMEN: Nontender to palpation, normoactive bowel sounds, no rebound/guarding;   Ext : no pretibial edema   PSYCH: A+O to person affect appropriate                      --- End of Report ---    < end of copied text >                                      
  follow up for AMS/COVID   pt is seen in am , walking in the room     no complaints   awaka alert        follow up pending   neurology follow up appreciated       Patient denies chest pain, SOB, abd pain, N/V, fever, chills, dysuria or any other complaints. All remainder ROS negative.     MEDICATIONS  (STANDING):  ascorbic acid 500 milliGRAM(s) Oral two times a day  cholecalciferol 2000 Unit(s) Oral daily  ferrous    sulfate 325 milliGRAM(s) Oral daily  influenza   Vaccine 0.5 milliLiter(s) IntraMuscular once  lisinopril 5 milliGRAM(s) Oral daily  pantoprazole    Tablet 40 milliGRAM(s) Oral before breakfast  QUEtiapine 200 milliGRAM(s) Oral two times a day  sodium chloride 0.9% lock flush 3 milliLiter(s) IV Push every 8 hours        Vital Signs Last 24 Hrs  T(C): 36.7 (12 Sep 2022 11:23), Max: 36.7 (12 Sep 2022 11:23)  T(F): 98 (12 Sep 2022 11:23), Max: 98 (12 Sep 2022 11:23)  HR: 95 (12 Sep 2022 11:23) (95 - 100)  BP: 97/60 (12 Sep 2022 11:23) (97/60 - 117/81)  BP(mean): --  RR: 19 (12 Sep 2022 11:23) (18 - 19)  SpO2: 100% (12 Sep 2022 06:08) (100% - 100%)        CONSTITUTIONAL: NAD, awake  alert oriented   RESPIRATORY: lungs are clear to auscultation bilaterally  CARDIOVASCULAR: Regular rate and rhythm, normal S1 and S2  ABDOMEN: Nontender to palpation, normoactive bowel sounds, no rebound/guarding;   Ext : no pretibial edema   PSYCH: A+O to person affect appropriate                          9.6    6.98  )-----------( 303      ( 11 Sep 2022 06:39 )             27.1   09-11    137  |  102  |  7.8<L>  ----------------------------<  111<H>  3.9   |  22.0  |  0.93    Ca    9.0      11 Sep 2022 06:39    TPro  6.3<L>  /  Alb  3.2<L>  /  TBili  0.5  /  DBili  x   /  AST  22  /  ALT  31  /  AlkPhos  74  09-11             < from: CT Head No Cont (09.08.22 @ 22:39) >  IMPRESSION: No intracranial hemorrhage or mass effect.    --- End of Report ---    < end of copied text >                                      
[facility] Division of Hospital Medicine    Chief Complaint: Confusion, agitation     SUBJECTIVE / OVERNIGHT EVENTS:   Pt says he feels OK   Wife at bedside     Patient denies chest pain, SOB, abd pain, N/V, fever, chills, dysuria or any other complaints. All remainder ROS negative.     MEDICATIONS  (STANDING):  ascorbic acid 500 milliGRAM(s) Oral two times a day  carvedilol 3.125 milliGRAM(s) Oral every 12 hours  cholecalciferol 2000 Unit(s) Oral daily  enoxaparin Injectable 40 milliGRAM(s) SubCutaneous every 24 hours  ferrous    sulfate 325 milliGRAM(s) Oral daily  influenza   Vaccine 0.5 milliLiter(s) IntraMuscular once  lisinopril 2.5 milliGRAM(s) Oral daily  pantoprazole    Tablet 40 milliGRAM(s) Oral before breakfast  QUEtiapine 200 milliGRAM(s) Oral two times a day  sodium chloride 0.9% lock flush 3 milliLiter(s) IV Push every 8 hours    MEDICATIONS  (PRN):  acetaminophen     Tablet .. 650 milliGRAM(s) Oral every 6 hours PRN Temp greater or equal to 38C (100.4F), Mild Pain (1 - 3)  aluminum hydroxide/magnesium hydroxide/simethicone Suspension 30 milliLiter(s) Oral every 4 hours PRN Dyspepsia  melatonin 3 milliGRAM(s) Oral at bedtime PRN Insomnia  ondansetron Injectable 4 milliGRAM(s) IV Push every 8 hours PRN Nausea and/or Vomiting        I&O's Summary      PHYSICAL EXAM:  Vital Signs Last 24 Hrs  T(C): 37.2 (19 Sep 2022 11:45), Max: 37.2 (19 Sep 2022 11:45)  T(F): 99 (19 Sep 2022 11:45), Max: 99 (19 Sep 2022 11:45)  HR: 80 (19 Sep 2022 11:45) (80 - 95)  BP: 112/75 (19 Sep 2022 11:45) (110/70 - 130/68)  BP(mean): --  RR: 19 (19 Sep 2022 04:36) (18 - 19)  SpO2: 99% (19 Sep 2022 11:45) (99% - 100%)    Parameters below as of 19 Sep 2022 11:45  Patient On (Oxygen Delivery Method): room air          CONSTITUTIONAL: NAD, sitting up in bed   ENMT: Moist oral mucosa, EOMI   RESPIRATORY: Normal respiratory effort; lungs are clear to auscultation bilaterally   CARDIOVASCULAR: Regular rate and rhythm, normal S1 and S2, No lower extremity edema   ABDOMEN: Nontender to palpation, normoactive bowel sounds   MUSCULOSKELETAL:  No clubbing or cyanosis of digits   PSYCH: A+O to person, place, and time, poor insight, tangential thought process, pressured speech   NEUROLOGY: No gross sensory or motor deficits   SKIN: Warm and dry       LABS:                        11.5   6.01  )-----------( 348      ( 19 Sep 2022 06:27 )             32.1     09-19    135  |  98  |  13.6  ----------------------------<  93  4.1   |  24.0  |  0.88    Ca    9.6      19 Sep 2022 06:27  
  follow up for AMS/COVID   pt is delusional , wandering in room , last night events reviewed   pt had pulled monitor lead put in garbage per nurse this am       d/w Dr Henry from    pt needs to be on 1;1 ordered this am       MEDICATIONS  (STANDING):  ascorbic acid 500 milliGRAM(s) Oral two times a day  carvedilol 3.125 milliGRAM(s) Oral every 12 hours  cholecalciferol 2000 Unit(s) Oral daily  ferrous    sulfate 325 milliGRAM(s) Oral daily  fluPHENAZine decanoate Injectable, Long Acting 50 milliGRAM(s) IntraMuscular once  influenza   Vaccine 0.5 milliLiter(s) IntraMuscular once  lisinopril 5 milliGRAM(s) Oral daily  pantoprazole    Tablet 40 milliGRAM(s) Oral before breakfast  QUEtiapine 200 milliGRAM(s) Oral two times a day  sodium chloride 0.9% lock flush 3 milliLiter(s) IV Push every 8 hours    Vital Signs Last 24 Hrs  T(C): 36.8 (14 Sep 2022 10:56), Max: 36.8 (14 Sep 2022 10:56)  T(F): 98.3 (14 Sep 2022 10:56), Max: 98.3 (14 Sep 2022 10:56)  HR: 100 (14 Sep 2022 10:56) (100 - 125)  BP: 103/64 (14 Sep 2022 10:56) (103/64 - 140/88)  BP(mean): --  RR: 19 (14 Sep 2022 10:56) (18 - 19)  SpO2: 100% (14 Sep 2022 10:56) (92% - 100%)    Parameters below as of 14 Sep 2022 10:56  Patient On (Oxygen Delivery Method): room air        CONSTITUTIONAL: NAD, awake  alert oriented   RESPIRATORY: lungs are clear to auscultation bilaterally  CARDIOVASCULAR: Regular rate and rhythm, normal S1 and S2  ABDOMEN: Nontender to palpation, normoactive bowel sounds  Ext : no pretibial edema   PSYC: delusional hearing voices, anxious   Skin : normal color                         10.6   8.73  )-----------( 309      ( 14 Sep 2022 06:25 )             29.3   09-14    135  |  100  |  14.5  ----------------------------<  92  4.4   |  24.0  |  1.03    Ca    9.4      14 Sep 2022 06:25  Phos  3.0     09-14  Mg     1.9     09-14                        --- End of Report ---    < end of copied text >                                      
[facility] Division of Hospital Medicine    follow up for AMS/COVID   seen this am awake sitting in the bed , c/o taht seroquel making his sugar go up   refused yesterday   denies h/o diabetes  explained to him that seroqule does not affect BG     he denies any pain , no SOB , stable    follow up noted he refused to talk to the NP yesterday     Patient denies chest pain, SOB, abd pain, N/V, fever, chills, dysuria or any other complaints. All remainder ROS negative.     MEDICATIONS  (STANDING):  influenza   Vaccine 0.5 milliLiter(s) IntraMuscular once  lisinopril 10 milliGRAM(s) Oral daily  pantoprazole    Tablet 40 milliGRAM(s) Oral before breakfast  QUEtiapine 200 milliGRAM(s) Oral two times a day  sodium chloride 0.9% lock flush 3 milliLiter(s) IV Push every 8 hours  ausea and/or Vomiting          Vital Signs Last 24 Hrs  T(C): 36.7 (11 Sep 2022 07:59), Max: 36.7 (11 Sep 2022 07:59)  T(F): 98 (11 Sep 2022 07:59), Max: 98 (11 Sep 2022 07:59)  HR: 100 (11 Sep 2022 07:59) (100 - 100)  BP: 110/67 (11 Sep 2022 07:59) (110/67 - 112/76)  BP(mean): --  RR: 17 (11 Sep 2022 07:59) (17 - 18)  SpO2: 98% (11 Sep 2022 07:59) (98% - 100%)    Parameters below as of 11 Sep 2022 07:59  Patient On (Oxygen Delivery Method): room air        CONSTITUTIONAL: NAD, awake  alert oriented   ENMT: Moist oral mucosa  RESPIRATORY: lungs are clear to auscultation bilaterally  CARDIOVASCULAR: Regular rate and rhythm, normal S1 and S2  ABDOMEN: Nontender to palpation, normoactive bowel sounds, no rebound/guarding;   MUSCLOSKELETAL:  No clubbing or cyanosis of digits; no joint swelling or tenderness to palpation  PSYCH: A+O to person affect appropriate  Skin : left lower arm fore awrm swellimng , left entecubital iv line in place                           9.6    6.98  )-----------( 303      ( 11 Sep 2022 06:39 )             27.1   09-11    137  |  102  |  7.8<L>  ----------------------------<  111<H>  3.9   |  22.0  |  0.93    Ca    9.0      11 Sep 2022 06:39  Phos  3.1     09-10  Mg     2.1     09-10    TPro  6.3<L>  /  Alb  3.2<L>  /  TBili  0.5  /  DBili  x   /  AST  22  /  ALT  31  /  AlkPhos  74  09-11               < from: CT Head No Cont (09.08.22 @ 22:39) >  IMPRESSION: No intracranial hemorrhage or mass effect.    --- End of Report ---    < end of copied text >                                      
[facility] Division of Hospital Medicine    follow up for AMS/COVID   seen this am   resting at the bedside . awake   c/o that his wife had asked him to stop Carvedilol and she asked him not to take him  awake alert oriented to place tiime, events self         Patient denies chest pain, SOB, abd pain, N/V, fever, chills, dysuria or any other complaints. All remainder ROS negative.     MEDICATIONS  (STANDING):  influenza   Vaccine 0.5 milliLiter(s) IntraMuscular once  lisinopril 10 milliGRAM(s) Oral daily  pantoprazole    Tablet 40 milliGRAM(s) Oral before breakfast  QUEtiapine 200 milliGRAM(s) Oral two times a day  sodium chloride 0.9% lock flush 3 milliLiter(s) IV Push every 8 hours  ausea and/or Vomiting        I&O's Summary      PHYSICAL EXAM:  Vital Signs Last 24 Hrs  T(C): 36.2 (09 Sep 2022 11:30), Max: 37 (09 Sep 2022 06:04)  T(F): 97.1 (09 Sep 2022 11:30), Max: 98.6 (09 Sep 2022 06:04)  HR: 80 (09 Sep 2022 11:30) (76 - 114)  BP: 120/84 (09 Sep 2022 11:30) (116/77 - 141/84)  BP(mean): 71 (09 Sep 2022 04:12) (71 - 71)  RR: 17 (09 Sep 2022 11:30) (16 - 20)  SpO2: 99% (09 Sep 2022 11:30) (97% - 99%)    Parameters below as of 09 Sep 2022 11:30  Patient On (Oxygen Delivery Method): room air            CONSTITUTIONAL: NAD,awake  alert oriented   ENMT: Moist oral mucosa, no pharyngeal injection or exudates; normal dentition  RESPIRATORY: lungs are clear to auscultation bilaterally  CARDIOVASCULAR: Regular rate and rhythm, normal S1 and S2, no murmur/rub/gallop; Peripheral pulses are 2+ bilaterally  ABDOMEN: Nontender to palpation, normoactive bowel sounds, no rebound/guarding;   MUSCLOSKELETAL:  No clubbing or cyanosis of digits; no joint swelling or tenderness to palpation  PSYCH: A+O to person affect appropriate  NEUROLOGY: CN 2-12 are intact and symmetric; no gross sensory deficits;   SKIN: No rashes; no palpable lesions                          9.5    7.09  )-----------( 287      ( 10 Sep 2022 07:40 )             26.7   09-10    137  |  103  |  9.7  ----------------------------<  99  4.3   |  23.0  |  0.90    Ca    8.8      10 Sep 2022 07:40  Phos  3.1     09-10  Mg     2.1     09-10    TPro  6.6  /  Alb  3.9  /  TBili  0.5  /  DBili  x   /  AST  43<H>  /  ALT  50<H>  /  AlkPhos  83  09-08      POCT Blood Glucose.: 109 mg/dL (08 Sep 2022 21:51)      < from: CT Head No Cont (09.08.22 @ 22:39) >  IMPRESSION: No intracranial hemorrhage or mass effect.    --- End of Report ---    < end of copied text >                                      
[facility] Division of Hospital Medicine    Chief Complaint:    AMS/Abnormal    SUBJECTIVE / OVERNIGHT EVENTS:  admitted overnight   on eeg    Patient denies chest pain, SOB, abd pain, N/V, fever, chills, dysuria or any other complaints. All remainder ROS negative.     MEDICATIONS  (STANDING):  lisinopril 10 milliGRAM(s) Oral daily  pantoprazole    Tablet 40 milliGRAM(s) Oral before breakfast  QUEtiapine 200 milliGRAM(s) Oral two times a day  sodium chloride 0.9% lock flush 3 milliLiter(s) IV Push every 8 hours    MEDICATIONS  (PRN):  acetaminophen     Tablet .. 650 milliGRAM(s) Oral every 6 hours PRN Temp greater or equal to 38C (100.4F), Mild Pain (1 - 3)  aluminum hydroxide/magnesium hydroxide/simethicone Suspension 30 milliLiter(s) Oral every 4 hours PRN Dyspepsia  melatonin 3 milliGRAM(s) Oral at bedtime PRN Insomnia  ondansetron Injectable 4 milliGRAM(s) IV Push every 8 hours PRN Nausea and/or Vomiting        I&O's Summary      PHYSICAL EXAM:  Vital Signs Last 24 Hrs  T(C): 36.2 (09 Sep 2022 11:30), Max: 37 (09 Sep 2022 06:04)  T(F): 97.1 (09 Sep 2022 11:30), Max: 98.6 (09 Sep 2022 06:04)  HR: 80 (09 Sep 2022 11:30) (76 - 114)  BP: 120/84 (09 Sep 2022 11:30) (116/77 - 141/84)  BP(mean): 71 (09 Sep 2022 04:12) (71 - 71)  RR: 17 (09 Sep 2022 11:30) (16 - 20)  SpO2: 99% (09 Sep 2022 11:30) (97% - 99%)    Parameters below as of 09 Sep 2022 11:30  Patient On (Oxygen Delivery Method): room air            CONSTITUTIONAL: NAD, well-developed  ENMT: Moist oral mucosa, no pharyngeal injection or exudates; normal dentition  RESPIRATORY: Normal respiratory effort; lungs are clear to auscultation bilaterally  CARDIOVASCULAR: Regular rate and rhythm, normal S1 and S2, no murmur/rub/gallop; Peripheral pulses are 2+ bilaterally  ABDOMEN: Nontender to palpation, normoactive bowel sounds, no rebound/guarding;   MUSCLOSKELETAL:  No clubbing or cyanosis of digits; no joint swelling or tenderness to palpation  PSYCH: A+O to person affect appropriate  NEUROLOGY: CN 2-12 are intact and symmetric; no gross sensory deficits;   SKIN: No rashes; no palpable lesions    LABS:                        9.5    9.92  )-----------( 323      ( 08 Sep 2022 23:28 )             26.0     09-08    135  |  97<L>  |  11.5  ----------------------------<  108<H>  3.8   |  20.0<L>  |  1.04    Ca    9.5      08 Sep 2022 23:28  Mg     1.6     09-08    TPro  6.6  /  Alb  3.9  /  TBili  0.5  /  DBili  x   /  AST  43<H>  /  ALT  50<H>  /  AlkPhos  83  09-08              CAPILLARY BLOOD GLUCOSE      POCT Blood Glucose.: 109 mg/dL (08 Sep 2022 21:51)        RADIOLOGY & ADDITIONAL TESTS:  Results Reviewed:   Imaging Personally Reviewed:  Electrocardiogram Personally Reviewed:                                          
                            [facility]                                        Neurology at [place]                                 Octavio Chandler, & Blair                                  [address]                                        [place], [address]                                             [phone]        CC: altered mental status     HPI:   The patient is a 51y Male who presented with Confusion.  He says that he was tired and disoriented, but feels better now.  He had recent admission 9/2-9/5  for AMS which was thought to be metabolic in etiology.  Apparently has had multiple similar episodes recently.  Neurology is asked to evaluate.    Interim history:  Remains on 6 [address].     ROS:   Denies headache or dizziness.  Denies chest pain.  Denies shortness of breath.    MEDICATIONS  (STANDING):  ascorbic acid 500 milliGRAM(s) Oral two times a day  cholecalciferol 2000 Unit(s) Oral daily  ferrous    sulfate 325 milliGRAM(s) Oral daily  influenza   Vaccine 0.5 milliLiter(s) IntraMuscular once  lisinopril 5 milliGRAM(s) Oral daily  pantoprazole    Tablet 40 milliGRAM(s) Oral before breakfast  QUEtiapine 200 milliGRAM(s) Oral two times a day  sodium chloride 0.9% lock flush 3 milliLiter(s) IV Push every 8 hours    Vital Signs Last 24 Hrs  T(C): 36.1 (12 Sep 2022 06:08), Max: 36.1 (12 Sep 2022 06:08)  T(F): 97 (12 Sep 2022 06:08), Max: 97 (12 Sep 2022 06:08)  HR: 100 (12 Sep 2022 06:08) (100 - 100)  BP: 117/81 (12 Sep 2022 06:08) (117/81 - 117/81)  RR: 18 (12 Sep 2022 06:08) (18 - 18)  SpO2: 100% (12 Sep 2022 06:08) (100% - 100%)    Detailed Neurologic Exam:    Mental status: The patient is awake and alert. There is no aphasia. There is no dysarthria.     Cranial nerves: Pupils equal and react symmetrically to light. There is no visual field deficit to threat. Extraocular motion is full with no nystagmus. Facial sensation is intact. Facial musculature is symmetric. Palate elevates symmetrically. Tongue is midline.    Motor: There is normal bulk and tone.  There is no tremor.  Strength grossly 5/5 bilaterally.    Sensation: Grossly intact to light touch and pin.    Reflexes: 2+ throughout and plantar responses are flexor.    Cerebellar: No dysmetria on finger nose testing.    Labs:     09-11    137  |  102  |  7.8<L>  ----------------------------<  111<H>  3.9   |  22.0  |  0.93    Ca    9.0      11 Sep 2022 06:39    TPro  6.3<L>  /  Alb  3.2<L>  /  TBili  0.5  /  DBili  x   /  AST  22  /  ALT  31  /  AlkPhos  74  09-11                        9.6    6.98  )-----------( 303      ( 11 Sep 2022 06:39 )             27.1     Ammonia: 19    Rad:   Head CT 9/8/22: negative     EEG: Normal.      
Chief complaint: Confusion    Patient seen and examined at bedside. No acute overnight events reported. No fever, chills, cough, chest pain, nausea or vomiting.     Vital Signs Last 24 Hrs  T(F): 98.3 (17 Sep 2022 06:52), Max: 98.7 (16 Sep 2022 12:07)  HR: 95 (17 Sep 2022 06:52) (73 - 112)  BP: 110/63 (17 Sep 2022 06:52) (101/68 - 131/79)  RR: 18 (17 Sep 2022 06:52) (18 - 19)  SpO2: 100% (17 Sep 2022 06:52) (98% - 100%)    Physical Exam:  Constitutional: alert and oriented, in no acute distress   Neck: Soft and supple  Respiratory: Clear to auscultation bilaterally, no wheezes or crackles  Cardiovascular: Regular rate and rhyhtm, no murmurs, gallops, rubs  Gastrointestinal: Soft, non-tender to palpation, +bs  Musculoskeletal: 5/5 strength b/l upper and lower extremities, no lower extremity edema bilaterally

## 2022-09-19 NOTE — PROGRESS NOTE ADULT - REASON FOR ADMISSION
Confusion  Seizure like activity

## 2022-09-19 NOTE — PROGRESS NOTE ADULT - PROVIDER SPECIALTY LIST ADULT
Hospitalist
Neurology
Hospitalist
Hospitalist
Neurology

## 2022-09-19 NOTE — PROGRESS NOTE ADULT - ASSESSMENT
50 y/o male with episodic agitation/impulsive behavior associated with eye fluttering/starring episodes, COVID, MDD, breast cancer/Lymphoma, HTN     1-Abnormal behavior/AMS  Psychomotor agitation from MDD and or due to COVID infection   neurology follow up   EEG normal study   - evaluation  needed prior DC      2-COVID 19 infection   on room air   asymptomatic stable   Supportive care   on isolation per protocol     3-MDD h/o schizophrenia   Cont. out patient  regimen   -  follow up needed     4-Breast cancer/lymphoma:  s/p surgery in the right   right forearm swelling suspected iv line phlebitis   will remove elevate use warm compress     -[facility] ?       5-HTN:  -DASH diet  -Cont. o/p regimen     Dispo: Acute  home soon   need  evaluation to return home with wife vs new housing 
52 y/o male with episodic agitation/impulsive behavior associated with eye fluttering/starring episodes, COVID, MDD, breast cancer/Lymphoma, HTN     1-Abnormal behavior/AMS due to Schizoaffective disease cronic delusional    consult appreciated   Prolixin im dose x1 ordered   on 1:1   pt needs in patient hospitalization   had to complete 10 days quarantine due to COVID before can discharge   metabolic work up for encephalopathy neg   neuro stable      2-COVID 19 infection   on room air   asymptomatic stable   on isolation per protocol     3-Breast cancer/lymphoma  s/p surgery in the right breast under arm   -F/U with Dr. Pinto at [facility] re further chemo    4-HTN:  on lisinopril  resume coreg home dose   -DASH diet    5- Tachycardia Sinus likely due to agitation   resume coreg home meds     Dispo: to psychiatric facility in 5 days 
52 y/o male with episodic agitation/impulsive behavior associated with eye fluttering/starring episodes, COVID, MDD, breast cancer/Lymphoma, HTN     Abnormal behavior/AMS due to Schizoaffective disease, delusional   - Behavioral health consult appreciated  - s/p Prolixin x 1 on 9/15  - 1:1  - Ativan PRN  - Patient requires inpatient hospitalization  - Will complete quarantine on 9/20     COVID 19 infection   - Asymptomatic  - Isolation per protocol    Breast cancer/lymphoma  - s/p surgery in the right breast under arm   - F/U with Dr. Pinto at [facility] re further chemo    HTN  - Lisinopril 2.5mg daily  - Coreg 3.125mg q12h    Tachycardia Sinus likely due to agitation   - stable     Dispo: to psychiatric facility inpatient next week by 9/20 to complete 10 days quarantine due to COVID
50 y/o male with episodic agitation/impulsive behavior associated with eye fluttering/starring episodes, COVID, MDD, breast cancer/Lymphoma, HTN     1-Abnormal behavior/AMS due to Schizoaffective disease , delusional    consult appreciated   s/p Prolixin im dose x1  on 1:1 Indication ; elopement risk and impulsivity  per    pt needs in patient hospitalization   had to complete 10 days quarantine due to COVID before can discharge to inpatient psych   metabolic work up for encephalopathy   stable      2-COVID 19 infection   on room air   asymptomatic stable       3-Breast cancer/lymphoma  s/p surgery in the right breast under arm   -F/U with Dr. Pinto at [facility] re further chemo    4-HTN:  on lisinopril and coreg for heart per his oncologist   -DASH diet    5- Tachycardia Sinus likely due to agitation   improved     Dispo: to psychiatric facility in 5 days 
50 y/o male with episodic agitation/impulsive behavior associated with eye fluttering/starring episodes, COVID, MDD, breast cancer/Lymphoma, HTN     1-Abnormal behavior/AMS due to Schizoaffective disease , delusional   BH consult appreciated   s/p Prolixin im dose x1 9/15   cont 1:1 , ativan prn   pt needs in patient hospitalization   had to complete 10 days quarantine due to COVID before can discharge to inpatient psych      2-COVID 19 infection   on room air   asymptomatic stable   isolation per protocol     3-Breast cancer/lymphoma  s/p surgery in the right breast under arm   -[facility] with [facility]    4-HTN:  on  lisinopril and coreg for heart per his oncologist in [facility]     -DASH diet    5- Tachycardia Sinus likely due to agitation   stable     Dispo: to psychiatric facility inpatient next week by 9/20 to complete 10 days quarantine due to COVID
50 y/o male with episodic agitation/impulsive behavior associated with eye fluttering/starring episodes, COVID, MDD, breast cancer/Lymphoma, HTN     Abnormal behavior/AMS due to Schizoaffective disease, delusional   - Behavioral health consult appreciated  - s/p Prolixin x 1 on 9/15  - 1:1  - Ativan PRN  - Patient requires inpatient hospitalization  - Will complete quarantine on 9/20     COVID 19 infection   - Asymptomatic  - Isolation per protocol    Breast cancer/lymphoma  - s/p surgery in the right breast under arm   - F/U with Dr. Pinto at [facility] re further chemo    HTN  - Lisinopril 2.5mg daily  - Coreg 3.125mg q12h    Tachycardia Sinus likely due to agitation   - stable     Dispo: to psychiatric facility inpatient next week by 9/20 to complete 10 days quarantine due to COVID
51y Male who is followed by neurology because of AMS    AMS  Several episodes recently.  Now resolved, feels better.  Head CT/ EEG negative.    At this point, I do not see indication for Brain MRI as patient no longer agitated/violent.     Recommend follow up with Behavioral Health Team.     No further specific neurologic recommendations. Will be available as needed.     Case discussed with Dr Johnson.   
52 y/o male with episodic agitation/impulsive behavior associated with eye fluttering/starring episodes, COVID, MDD, breast cancer / Lymphoma, HTN     Abnormal behavior / AMS due to Schizoaffective disease, delusional   Patient seen by neurology, CT and EEG done with no acute findings  Behavioral health consult appreciated, c/w current meds   s/p Prolixin x 1 on 9/15   c/w 1:1   Ativan PRN   Patient requires inpatient hospitalization   Will complete quarantine on 9/20     COVID 19 infection   Asymptomatic   Isolation per protocol     Breast cancer / lymphoma   s/p surgery in the right breast under arm   F/U with Dr. Pinto at [facility] re further chemo     HTN  Lisinopril 2.5mg daily  Coreg 3.125mg q12h    Tachycardia Sinus likely due to agitation   stable     Dispo: to psychiatric facility inpatient 9/20 after completing 10 days quarantine due to COVID  
52 y/o male with episodic agitation/impulsive behavior associated with eye fluttering/starring episodes, COVID, MDD, breast cancer/Lymphoma, HTN     1-Abnormal behavior/AMS  likely Psychomotor agitation from MDD and or due to COVID infection   neurology follow up noted , no further work up   EEG normal study   - evaluation  needed prior DC      2-COVID 19 infection   on room air   asymptomatic stable   on isolation per protocol     3-MDD h/o schizophrenia   Continue out patient  regimen    follow up     4-Breast cancer/lymphoma  s/p surgery in the right breast under arm   right forearm swelling suspected iv line phlebitis   warm compress   improving     -[facility] with [facility] ?     5-HTN:  -DASH diet  -Cont lisinopril     Dispo: home pending  consult 
The patient is a 51y Male who is followed by neurology because of AMS    AMS  several episodes recently  now resolved, feels better  ?toxic-metabolic    -(+) COVID    - elevated ammonia last admission  EEG negative for epileptiform activity  May need MRI braion with/without contrast if metabolic work up is unrevealing    will follow with you    Marquez Cunningham MD PhD   [id number]
50 y/o male with episodic agitation/impulsive behavior associated with eye fluttering/starring episodes, COVID, MDD, breast cancer/Lymphoma, HTN     1-Abnormal behavior/AMS  Psychomotor agitation from MDD and or due to COVID infection   calm cooperative with delusional thoughts c/o his wife   asking to go to his friend's house on DC   will refer to SW   EEG normal study report reviewed     - evaluation P    Neuro consult noted   check TSH , vit b12      2-COVID 19 infection   on room air   asymptomatic stable   Supportive care   on isolation per protocol     3-MDD h/o schizophrenia   Cont. o/p regimen  -  follow up     4-Breast cancer/lymphoma:  -F/U with Dr. Pinto at [facility]    5-HTN:  -DASH diet  -Cont. o/p regimen     Dispo: Acute 
52 y/o male with episodic agitation/impulsive behavior associated with eye fluttering/starring episodes, COVID, MDD, breast cancer/Lymphoma, HTN     1-Abnormal behavior/AMS  likely Psychomotor agitation from MDD and or due to COVID infection   neurology follow up noted , no further work up   EEG normal study   - evaluation  needed prior DC      2-COVID 19 infection   on room air   asymptomatic stable   on isolation per protocol     3-MDD h/o schizophrenia   Continue out patient  regimen    follow up     4-Breast cancer/lymphoma  s/p surgery in the right breast under arm   right forearm swelling suspected iv line phlebitis   warm compress   improving     -F/U with [facility] re further chemo ?     5-HTN:  -DASH diet  -Cont lisinopril     Dispo: home pending  consult 
52 y/o male with episodic agitation/impulsive behavior associated with eye fluttering/starring episodes, COVID, MDD, breast cancer/Lymphoma, HTN     Abnormal behavior/AMS  Psychomotor agitation from MDD vs Leptomeningeal involvement from malignancy? vs Seizures? covid?   -EEG   - eval, Neuro consulted  -CTH as above, vitals stable  -Fall risk, ambulate with assistance  -DVT-P     COVID:  -Supportive care   -not hypoxic  -isolation    MDD:  -Cont. o/p regimen  - f/u    Breast cancer/lymphoma:  -F/U with Dr. Pinto at [facility]    HTN:  -DASH diet  -Cont. o/p regimen     Dispo: Acute

## 2022-09-20 ENCOUNTER — TRANSCRIPTION ENCOUNTER (OUTPATIENT)
Age: 52
End: 2022-09-20

## 2022-09-20 VITALS
DIASTOLIC BLOOD PRESSURE: 77 MMHG | TEMPERATURE: 98 F | RESPIRATION RATE: 18 BRPM | OXYGEN SATURATION: 98 % | HEART RATE: 94 BPM | SYSTOLIC BLOOD PRESSURE: 123 MMHG

## 2022-09-20 LAB — SARS-COV-2 RNA SPEC QL NAA+PROBE: SIGNIFICANT CHANGE UP

## 2022-09-20 PROCEDURE — 99239 HOSP IP/OBS DSCHRG MGMT >30: CPT

## 2022-09-20 PROCEDURE — 80048 BASIC METABOLIC PNL TOTAL CA: CPT

## 2022-09-20 PROCEDURE — 82140 ASSAY OF AMMONIA: CPT

## 2022-09-20 PROCEDURE — 82962 GLUCOSE BLOOD TEST: CPT

## 2022-09-20 PROCEDURE — 84443 ASSAY THYROID STIM HORMONE: CPT

## 2022-09-20 PROCEDURE — 99285 EMERGENCY DEPT VISIT HI MDM: CPT

## 2022-09-20 PROCEDURE — U0005: CPT

## 2022-09-20 PROCEDURE — 85025 COMPLETE CBC W/AUTO DIFF WBC: CPT

## 2022-09-20 PROCEDURE — 70450 CT HEAD/BRAIN W/O DYE: CPT | Mod: 26

## 2022-09-20 PROCEDURE — 99233 SBSQ HOSP IP/OBS HIGH 50: CPT

## 2022-09-20 PROCEDURE — 95819 EEG AWAKE AND ASLEEP: CPT

## 2022-09-20 PROCEDURE — 82607 VITAMIN B-12: CPT

## 2022-09-20 PROCEDURE — 80307 DRUG TEST PRSMV CHEM ANLYZR: CPT

## 2022-09-20 PROCEDURE — 83735 ASSAY OF MAGNESIUM: CPT

## 2022-09-20 PROCEDURE — 70450 CT HEAD/BRAIN W/O DYE: CPT

## 2022-09-20 PROCEDURE — 85027 COMPLETE CBC AUTOMATED: CPT

## 2022-09-20 PROCEDURE — 84100 ASSAY OF PHOSPHORUS: CPT

## 2022-09-20 PROCEDURE — 83036 HEMOGLOBIN GLYCOSYLATED A1C: CPT

## 2022-09-20 PROCEDURE — 82746 ASSAY OF FOLIC ACID SERUM: CPT

## 2022-09-20 PROCEDURE — 93005 ELECTROCARDIOGRAM TRACING: CPT

## 2022-09-20 PROCEDURE — U0003: CPT

## 2022-09-20 PROCEDURE — 80053 COMPREHEN METABOLIC PANEL: CPT

## 2022-09-20 PROCEDURE — 0225U NFCT DS DNA&RNA 21 SARSCOV2: CPT

## 2022-09-20 PROCEDURE — 36415 COLL VENOUS BLD VENIPUNCTURE: CPT

## 2022-09-20 RX ORDER — LANOLIN ALCOHOL/MO/W.PET/CERES
1 CREAM (GRAM) TOPICAL
Qty: 0 | Refills: 0 | DISCHARGE
Start: 2022-09-20

## 2022-09-20 RX ORDER — FLUPHENAZINE HYDROCHLORIDE 1 MG/1
2 TABLET, FILM COATED ORAL
Qty: 0 | Refills: 0 | DISCHARGE

## 2022-09-20 RX ORDER — FERROUS SULFATE 325(65) MG
1 TABLET ORAL
Qty: 0 | Refills: 0 | DISCHARGE
Start: 2022-09-20

## 2022-09-20 RX ORDER — ASCORBIC ACID 60 MG
1 TABLET,CHEWABLE ORAL
Qty: 0 | Refills: 0 | DISCHARGE
Start: 2022-09-20

## 2022-09-20 RX ORDER — CHOLECALCIFEROL (VITAMIN D3) 125 MCG
2000 CAPSULE ORAL
Qty: 0 | Refills: 0 | DISCHARGE
Start: 2022-09-20

## 2022-09-20 RX ORDER — BUPROPION HYDROCHLORIDE 150 MG/1
1 TABLET, EXTENDED RELEASE ORAL
Qty: 0 | Refills: 0 | DISCHARGE

## 2022-09-20 RX ORDER — CARVEDILOL PHOSPHATE 80 MG/1
1 CAPSULE, EXTENDED RELEASE ORAL
Qty: 0 | Refills: 0 | DISCHARGE
Start: 2022-09-20

## 2022-09-20 RX ORDER — ACETAMINOPHEN 500 MG
2 TABLET ORAL
Qty: 0 | Refills: 0 | DISCHARGE
Start: 2022-09-20

## 2022-09-20 RX ADMIN — CARVEDILOL PHOSPHATE 3.12 MILLIGRAM(S): 80 CAPSULE, EXTENDED RELEASE ORAL at 17:01

## 2022-09-20 RX ADMIN — LISINOPRIL 2.5 MILLIGRAM(S): 2.5 TABLET ORAL at 05:38

## 2022-09-20 RX ADMIN — Medication 500 MILLIGRAM(S): at 17:01

## 2022-09-20 RX ADMIN — SODIUM CHLORIDE 3 MILLILITER(S): 9 INJECTION INTRAMUSCULAR; INTRAVENOUS; SUBCUTANEOUS at 12:21

## 2022-09-20 RX ADMIN — QUETIAPINE FUMARATE 200 MILLIGRAM(S): 200 TABLET, FILM COATED ORAL at 17:02

## 2022-09-20 RX ADMIN — Medication 2000 UNIT(S): at 17:01

## 2022-09-20 RX ADMIN — CARVEDILOL PHOSPHATE 3.12 MILLIGRAM(S): 80 CAPSULE, EXTENDED RELEASE ORAL at 05:38

## 2022-09-20 RX ADMIN — Medication 500 MILLIGRAM(S): at 05:38

## 2022-09-20 RX ADMIN — PANTOPRAZOLE SODIUM 40 MILLIGRAM(S): 20 TABLET, DELAYED RELEASE ORAL at 05:38

## 2022-09-20 RX ADMIN — Medication 325 MILLIGRAM(S): at 17:05

## 2022-09-20 NOTE — BH CONSULTATION LIAISON PROGRESS NOTE - NSBHMSETHTPROC_PSY_A_CORE
Disorganized/Thought blocking

## 2022-09-20 NOTE — BH CONSULTATION LIAISON PROGRESS NOTE - OTHER
not formally assessed 

## 2022-09-20 NOTE — BH CONSULTATION LIAISON PROGRESS NOTE - CURRENT MEDICATION
MEDICATIONS  (STANDING):  ascorbic acid 500 milliGRAM(s) Oral two times a day  carvedilol 3.125 milliGRAM(s) Oral every 12 hours  cholecalciferol 2000 Unit(s) Oral daily  enoxaparin Injectable 40 milliGRAM(s) SubCutaneous every 24 hours  ferrous    sulfate 325 milliGRAM(s) Oral daily  fluPHENAZine decanoate Injectable, Long Acting 50 milliGRAM(s) IntraMuscular once  influenza   Vaccine 0.5 milliLiter(s) IntraMuscular once  lisinopril 5 milliGRAM(s) Oral daily  pantoprazole    Tablet 40 milliGRAM(s) Oral before breakfast  QUEtiapine 200 milliGRAM(s) Oral two times a day  sodium chloride 0.9% lock flush 3 milliLiter(s) IV Push every 8 hours    MEDICATIONS  (PRN):  acetaminophen     Tablet .. 650 milliGRAM(s) Oral every 6 hours PRN Temp greater or equal to 38C (100.4F), Mild Pain (1 - 3)  aluminum hydroxide/magnesium hydroxide/simethicone Suspension 30 milliLiter(s) Oral every 4 hours PRN Dyspepsia  melatonin 3 milliGRAM(s) Oral at bedtime PRN Insomnia  ondansetron Injectable 4 milliGRAM(s) IV Push every 8 hours PRN Nausea and/or Vomiting  
MEDICATIONS  (STANDING):  ascorbic acid 500 milliGRAM(s) Oral two times a day  carvedilol 3.125 milliGRAM(s) Oral every 12 hours  cholecalciferol 2000 Unit(s) Oral daily  enoxaparin Injectable 40 milliGRAM(s) SubCutaneous every 24 hours  ferrous    sulfate 325 milliGRAM(s) Oral daily  influenza   Vaccine 0.5 milliLiter(s) IntraMuscular once  lisinopril 2.5 milliGRAM(s) Oral daily  pantoprazole    Tablet 40 milliGRAM(s) Oral before breakfast  QUEtiapine 200 milliGRAM(s) Oral two times a day  sodium chloride 0.9% lock flush 3 milliLiter(s) IV Push every 8 hours    MEDICATIONS  (PRN):  acetaminophen     Tablet .. 650 milliGRAM(s) Oral every 6 hours PRN Temp greater or equal to 38C (100.4F), Mild Pain (1 - 3)  aluminum hydroxide/magnesium hydroxide/simethicone Suspension 30 milliLiter(s) Oral every 4 hours PRN Dyspepsia  melatonin 3 milliGRAM(s) Oral at bedtime PRN Insomnia  ondansetron Injectable 4 milliGRAM(s) IV Push every 8 hours PRN Nausea and/or Vomiting  
MEDICATIONS  (STANDING):  ascorbic acid 500 milliGRAM(s) Oral two times a day  carvedilol 3.125 milliGRAM(s) Oral every 12 hours  cholecalciferol 2000 Unit(s) Oral daily  enoxaparin Injectable 40 milliGRAM(s) SubCutaneous every 24 hours  ferrous    sulfate 325 milliGRAM(s) Oral daily  influenza   Vaccine 0.5 milliLiter(s) IntraMuscular once  lisinopril 2.5 milliGRAM(s) Oral daily  pantoprazole    Tablet 40 milliGRAM(s) Oral before breakfast  QUEtiapine 200 milliGRAM(s) Oral two times a day  sodium chloride 0.9% lock flush 3 milliLiter(s) IV Push every 8 hours    MEDICATIONS  (PRN):  acetaminophen     Tablet .. 650 milliGRAM(s) Oral every 6 hours PRN Temp greater or equal to 38C (100.4F), Mild Pain (1 - 3)  aluminum hydroxide/magnesium hydroxide/simethicone Suspension 30 milliLiter(s) Oral every 4 hours PRN Dyspepsia  melatonin 3 milliGRAM(s) Oral at bedtime PRN Insomnia  ondansetron Injectable 4 milliGRAM(s) IV Push every 8 hours PRN Nausea and/or Vomiting  
MEDICATIONS  (STANDING):  ascorbic acid 500 milliGRAM(s) Oral two times a day  carvedilol 3.125 milliGRAM(s) Oral every 12 hours  cholecalciferol 2000 Unit(s) Oral daily  enoxaparin Injectable 40 milliGRAM(s) SubCutaneous every 24 hours  ferrous    sulfate 325 milliGRAM(s) Oral daily  influenza   Vaccine 0.5 milliLiter(s) IntraMuscular once  pantoprazole    Tablet 40 milliGRAM(s) Oral before breakfast  QUEtiapine 200 milliGRAM(s) Oral two times a day  sodium chloride 0.9% lock flush 3 milliLiter(s) IV Push every 8 hours    MEDICATIONS  (PRN):  acetaminophen     Tablet .. 650 milliGRAM(s) Oral every 6 hours PRN Temp greater or equal to 38C (100.4F), Mild Pain (1 - 3)  aluminum hydroxide/magnesium hydroxide/simethicone Suspension 30 milliLiter(s) Oral every 4 hours PRN Dyspepsia  melatonin 3 milliGRAM(s) Oral at bedtime PRN Insomnia  ondansetron Injectable 4 milliGRAM(s) IV Push every 8 hours PRN Nausea and/or Vomiting  
MEDICATIONS  (STANDING):  ascorbic acid 500 milliGRAM(s) Oral two times a day  carvedilol 3.125 milliGRAM(s) Oral every 12 hours  cholecalciferol 2000 Unit(s) Oral daily  enoxaparin Injectable 40 milliGRAM(s) SubCutaneous every 24 hours  ferrous    sulfate 325 milliGRAM(s) Oral daily  influenza   Vaccine 0.5 milliLiter(s) IntraMuscular once  lisinopril 2.5 milliGRAM(s) Oral daily  pantoprazole    Tablet 40 milliGRAM(s) Oral before breakfast  QUEtiapine 200 milliGRAM(s) Oral two times a day  sodium chloride 0.9% lock flush 3 milliLiter(s) IV Push every 8 hours    MEDICATIONS  (PRN):  acetaminophen     Tablet .. 650 milliGRAM(s) Oral every 6 hours PRN Temp greater or equal to 38C (100.4F), Mild Pain (1 - 3)  aluminum hydroxide/magnesium hydroxide/simethicone Suspension 30 milliLiter(s) Oral every 4 hours PRN Dyspepsia  melatonin 3 milliGRAM(s) Oral at bedtime PRN Insomnia  ondansetron Injectable 4 milliGRAM(s) IV Push every 8 hours PRN Nausea and/or Vomiting

## 2022-09-20 NOTE — BH CONSULTATION LIAISON PROGRESS NOTE - NSBHCHARTREVIEWLAB_PSY_A_CORE FT
Basic Metabolic Panel (09.10.22 @ 07:40)    Sodium, Serum: 137 mmol/L    Potassium, Serum: 4.3 mmol/L    Chloride, Serum: 103 mmol/L    Carbon Dioxide, Serum: 23.0 mmol/L    Anion Gap, Serum: 11 mmol/L    Blood Urea Nitrogen, Serum: 9.7 mg/dL    Creatinine, Serum: 0.90 mg/dL    Glucose, Serum: 99 mg/dL    Calcium, Total Serum: 8.8: Prior Reference Range of 8.6 – 10.2 was amended as of 7/26/2022 to 8.4 –  10.5. mg/dL    eGFR: 103: The estimated glomerular filtration rate (eGFR) is calculated using the  2021 CKD-EPI creatinine equation, which does not have a coefficient for  race and is validated in individuals 18 years of age and older (N Engl J  Med 2021; 385:1649-0997). Creatinine-based eGFR may be inaccurate in  various situations including but not limited to extremes of muscle mass,  altered dietary protein intake, or medications that affect renal tubular  creatinine secretion. mL/min/1.73m2  
Basic Metabolic Panel (09.10.22 @ 07:40)    Sodium, Serum: 137 mmol/L    Potassium, Serum: 4.3 mmol/L    Chloride, Serum: 103 mmol/L    Carbon Dioxide, Serum: 23.0 mmol/L    Anion Gap, Serum: 11 mmol/L    Blood Urea Nitrogen, Serum: 9.7 mg/dL    Creatinine, Serum: 0.90 mg/dL    Glucose, Serum: 99 mg/dL    Calcium, Total Serum: 8.8: Prior Reference Range of 8.6 – 10.2 was amended as of 7/26/2022 to 8.4 –  10.5. mg/dL    eGFR: 103: The estimated glomerular filtration rate (eGFR) is calculated using the  2021 CKD-EPI creatinine equation, which does not have a coefficient for  race and is validated in individuals 18 years of age and older (N Engl J  Med 2021; 385:0135-7784). Creatinine-based eGFR may be inaccurate in  various situations including but not limited to extremes of muscle mass,  altered dietary protein intake, or medications that affect renal tubular  creatinine secretion. mL/min/1.73m2  
Basic Metabolic Panel (09.10.22 @ 07:40)    Sodium, Serum: 137 mmol/L    Potassium, Serum: 4.3 mmol/L    Chloride, Serum: 103 mmol/L    Carbon Dioxide, Serum: 23.0 mmol/L    Anion Gap, Serum: 11 mmol/L    Blood Urea Nitrogen, Serum: 9.7 mg/dL    Creatinine, Serum: 0.90 mg/dL    Glucose, Serum: 99 mg/dL    Calcium, Total Serum: 8.8: Prior Reference Range of 8.6 – 10.2 was amended as of 7/26/2022 to 8.4 –  10.5. mg/dL    eGFR: 103: The estimated glomerular filtration rate (eGFR) is calculated using the  2021 CKD-EPI creatinine equation, which does not have a coefficient for  race and is validated in individuals 18 years of age and older (N Engl J  Med 2021; 385:1479-6161). Creatinine-based eGFR may be inaccurate in  various situations including but not limited to extremes of muscle mass,  altered dietary protein intake, or medications that affect renal tubular  creatinine secretion. mL/min/1.73m2  
Basic Metabolic Panel (09.10.22 @ 07:40)    Sodium, Serum: 137 mmol/L    Potassium, Serum: 4.3 mmol/L    Chloride, Serum: 103 mmol/L    Carbon Dioxide, Serum: 23.0 mmol/L    Anion Gap, Serum: 11 mmol/L    Blood Urea Nitrogen, Serum: 9.7 mg/dL    Creatinine, Serum: 0.90 mg/dL    Glucose, Serum: 99 mg/dL    Calcium, Total Serum: 8.8: Prior Reference Range of 8.6 – 10.2 was amended as of 7/26/2022 to 8.4 –  10.5. mg/dL    eGFR: 103: The estimated glomerular filtration rate (eGFR) is calculated using the  2021 CKD-EPI creatinine equation, which does not have a coefficient for  race and is validated in individuals 18 years of age and older (N Engl J  Med 2021; 385:1366-2550). Creatinine-based eGFR may be inaccurate in  various situations including but not limited to extremes of muscle mass,  altered dietary protein intake, or medications that affect renal tubular  creatinine secretion. mL/min/1.73m2  
Basic Metabolic Panel (09.10.22 @ 07:40)    Sodium, Serum: 137 mmol/L    Potassium, Serum: 4.3 mmol/L    Chloride, Serum: 103 mmol/L    Carbon Dioxide, Serum: 23.0 mmol/L    Anion Gap, Serum: 11 mmol/L    Blood Urea Nitrogen, Serum: 9.7 mg/dL    Creatinine, Serum: 0.90 mg/dL    Glucose, Serum: 99 mg/dL    Calcium, Total Serum: 8.8: Prior Reference Range of 8.6 – 10.2 was amended as of 7/26/2022 to 8.4 –  10.5. mg/dL    eGFR: 103: The estimated glomerular filtration rate (eGFR) is calculated using the  2021 CKD-EPI creatinine equation, which does not have a coefficient for  race and is validated in individuals 18 years of age and older (N Engl J  Med 2021; 385:8853-3151). Creatinine-based eGFR may be inaccurate in  various situations including but not limited to extremes of muscle mass,  altered dietary protein intake, or medications that affect renal tubular  creatinine secretion. mL/min/1.73m2

## 2022-09-20 NOTE — BH CONSULTATION LIAISON PROGRESS NOTE - NSBHATTESTBILLINGAW_PSY_A_CORE
Billing in another system

## 2022-09-20 NOTE — DISCHARGE NOTE NURSING/CASE MANAGEMENT/SOCIAL WORK - NSDCPEFALRISK_GEN_ALL_CORE
For information on Fall & Injury Prevention, visit: [email or web address] OR  [email or web address] OR  https://www.cdc.gov/steadi/patient.html

## 2022-09-20 NOTE — CHART NOTE - NSCHARTNOTEFT_GEN_A_CORE
Contacted by RN for patient fall. Patient with 1:1, who states patient got up out of bed and was staring out the window when all of a sound fell straight back, with body stiff like a board. Patient hit head on cushion of bedside chair and then eventually down onto the ground. Patient was lying stiff on ground at time of assessment. Patient nonverbal, would not articulate if he was in pain or what caused him to do that. Patient then was able to be stood back up, again resumed staring out the window. Patient then began tearing up. Eventually he was able to be guided back to bed with the assistance of RN and myself. Continued to have tear roll down while lying in bed but refused to respond to verbal questions.      Vital Signs  T(C): 36.4 (09-20-22 @ 10:36), Max: 36.9 (09-19-22 @ 18:10)  HR: 89 (09-20-22 @ 10:36) (89 - 98)  BP: 125/85 (09-20-22 @ 10:36) (108/75 - 125/85)  RR: 18 (09-20-22 @ 10:36) (18 - 20)  SpO2: 99% (09-20-22 @ 10:36) (98% - 100%)      A/P: s/p fall, hit head  patient on Lovenox subQ daily  CT head urgent ordered  was due to DC to [facility] for involuntary inpatient admission, staff believed patient may have attempted this stunt as a way to avoid having to leave  Discussed with Attending  Nurse Manager presented bedside  Patient to remain on 1:1  RN to monitor, assess and escalate to PA PRN.  Will continue to monitor. Contacted by RN for patient fall. Patient with 1:1, who states patient got up out of bed and was staring out the window when all of a sound fell straight back, with body stiff like a board. Patient hit head on cushion of bedside chair and then eventually down onto the ground. Patient was lying stiff on ground at time of assessment. Patient nonverbal, would not articulate if he was in pain or what caused him to do that. Patient then was able to be stood back up, again resumed staring out the window. Patient then began tearing up. Eventually he was able to be guided back to bed with the assistance of RN and myself. Continued to have tear roll down while lying in bed but refused to respond to verbal questions.      Vital Signs  T(C): 36.4 (09-20-22 @ 10:36), Max: 36.9 (09-19-22 @ 18:10)  HR: 89 (09-20-22 @ 10:36) (89 - 98)  BP: 125/85 (09-20-22 @ 10:36) (108/75 - 125/85)  RR: 18 (09-20-22 @ 10:36) (18 - 20)  SpO2: 99% (09-20-22 @ 10:36) (98% - 100%)    Physical Exam:  Gen: Tall, lean, nonverbal 2/2 lack of cooperation  HEENT: normocephalic, atraumatic, no step offs, no depressions, no raccoon eyes, MMM, PERRL, nystagmus cannot be r/o 2/2 lack of cooperation, EOMI  CV: RRR  Lungs: CTA b/l, unlabored respirations on RA  Abd: +bowel sounds, soft/nt/nd  Ext: MAEx4, without edema    A/P: s/p fall, hit head  patient on Lovenox subQ daily  CT head urgent ordered  was due to DC to [facility] for involuntary inpatient admission, staff believed patient may have attempted this stunt as a way to avoid having to leave  Discussed with Attending  Nurse Manager presented bedside  Patient to remain on 1:1  RN to monitor, assess and escalate to PA PRN.  Will continue to monitor. Contacted by RN for patient fall. Patient with 1:1, who states patient got up out of bed and was staring out the window when all of a sound fell straight back, with body stiff like a board. Patient hit head on cushion of bedside chair and then eventually down onto the ground. Patient was lying stiff on ground at time of assessment. Patient nonverbal, would not articulate if he was in pain or what caused him to do that. Patient then was able to be stood back up, again resumed staring out the window. Patient then began tearing up. Eventually he was able to be guided back to bed with the assistance of RN and myself. Continued to have tears roll down while lying in bed but refused to respond to verbal questions.      Vital Signs  T(C): 36.4 (09-20-22 @ 10:36), Max: 36.9 (09-19-22 @ 18:10)  HR: 89 (09-20-22 @ 10:36) (89 - 98)  BP: 125/85 (09-20-22 @ 10:36) (108/75 - 125/85)  RR: 18 (09-20-22 @ 10:36) (18 - 20)  SpO2: 99% (09-20-22 @ 10:36) (98% - 100%)    Physical Exam:  Gen: Tall, lean, nonverbal 2/2 lack of cooperation  HEENT: normocephalic, atraumatic, no step offs, no depressions, no raccoon eyes, MMM, PERRL, nystagmus cannot be r/o 2/2 lack of cooperation, EOMI  CV: RRR  Lungs: CTA b/l, unlabored respirations on RA  Abd: +bowel sounds, soft/nt/nd  Ext: MAEx4, without edema    A/P: s/p fall, hit head  patient on Lovenox subQ daily  CT head urgent ordered  was due to DC to [facility] for involuntary inpatient admission, staff believed patient may have attempted this stunt as a way to avoid having to leave  Discussed with Attending  Nurse Manager presented bedside  Patient to remain on 1:1  RN to monitor, assess and escalate to PA PRN.  Will continue to monitor.

## 2022-09-20 NOTE — BH CONSULTATION LIAISON PROGRESS NOTE - NSBHCHARTREVIEWVS_PSY_A_CORE FT
Vital Signs Last 24 Hrs  T(C): 37.2 (19 Sep 2022 11:45), Max: 37.2 (19 Sep 2022 11:45)  T(F): 99 (19 Sep 2022 11:45), Max: 99 (19 Sep 2022 11:45)  HR: 80 (19 Sep 2022 11:45) (80 - 95)  BP: 112/75 (19 Sep 2022 11:45) (110/70 - 112/75)  BP(mean): --  RR: 19 (19 Sep 2022 04:36) (19 - 19)  SpO2: 99% (19 Sep 2022 11:45) (99% - 99%)    Parameters below as of 19 Sep 2022 11:45  Patient On (Oxygen Delivery Method): room air    
Vital Signs Last 24 Hrs  T(C): 37.3 (14 Sep 2022 18:10), Max: 37.3 (14 Sep 2022 18:10)  T(F): 99.1 (14 Sep 2022 18:10), Max: 99.1 (14 Sep 2022 18:10)  HR: 94 (14 Sep 2022 18:10) (94 - 125)  BP: 115/68 (14 Sep 2022 18:10) (103/64 - 132/88)  BP(mean): --  RR: 19 (14 Sep 2022 18:10) (18 - 19)  SpO2: 99% (14 Sep 2022 18:10) (99% - 100%)    Parameters below as of 14 Sep 2022 10:56  Patient On (Oxygen Delivery Method): room air    
Vital Signs Last 24 Hrs  T(C): 36.7 (16 Sep 2022 16:24), Max: 37.1 (16 Sep 2022 12:07)  T(F): 98.1 (16 Sep 2022 16:24), Max: 98.7 (16 Sep 2022 12:07)  HR: 73 (16 Sep 2022 16:24) (62 - 112)  BP: 101/68 (16 Sep 2022 16:24) (101/68 - 131/79)  BP(mean): --  RR: 18 (16 Sep 2022 16:24) (18 - 19)  SpO2: 98% (16 Sep 2022 16:24) (98% - 100%)    Parameters below as of 16 Sep 2022 16:24  Patient On (Oxygen Delivery Method): room air    
Vital Signs Last 24 Hrs  T(C): 36.4 (20 Sep 2022 10:36), Max: 36.9 (19 Sep 2022 18:10)  T(F): 97.5 (20 Sep 2022 10:36), Max: 98.4 (19 Sep 2022 18:10)  HR: 89 (20 Sep 2022 10:36) (89 - 98)  BP: 125/85 (20 Sep 2022 10:36) (108/75 - 125/85)  BP(mean): --  RR: 18 (20 Sep 2022 10:36) (18 - 20)  SpO2: 99% (20 Sep 2022 10:36) (98% - 100%)    Parameters below as of 20 Sep 2022 10:36  Patient On (Oxygen Delivery Method): room air    
Vital Signs Last 24 Hrs  T(C): 36.9 (15 Sep 2022 17:53), Max: 36.9 (15 Sep 2022 17:53)  T(F): 98.5 (15 Sep 2022 17:53), Max: 98.5 (15 Sep 2022 17:53)  HR: 62 (15 Sep 2022 17:53) (62 - 102)  BP: 123/78 (15 Sep 2022 17:53) (97/61 - 123/78)  BP(mean): --  RR: 18 (15 Sep 2022 17:53) (18 - 18)  SpO2: 99% (15 Sep 2022 17:53) (97% - 99%)    Parameters below as of 15 Sep 2022 17:53  Patient On (Oxygen Delivery Method): room air

## 2022-09-20 NOTE — CHART NOTE - NSCHARTNOTEFT_GEN_A_CORE
Called by RN regarding d/c order.  Pt was scheduled to go to [facility] earlier, pt had a traumatic fall, hitting head.  Stat CT head ordered.  Reviewed results, no acute hemorrhage, mass effect or extra axial collections.  Confirmed with Dr. Rodriguez, agreed to d/c pt to [facility].  Conditional order placed for RN if pt's vitals stable/ no change in pt's status.

## 2022-09-20 NOTE — BH CONSULTATION LIAISON PROGRESS NOTE - NSBHFUPINTERVALHXFT_PSY_A_CORE
Patient is a 51 year old male who resides with his wife and 11 y/o son, with a past psychiatric history of depression and schizophrenia, following with [facility] and with no history of substance abuse and with a PMH of breast CA / Lymphoma and HTN who was brought in for change in behavior and currently admitted with asymptomatic COVID 19.Patient seen by neurology, CT and EEG done with no acute findings and recommended psych eval.     Patient seen today and found to be pleasant but still disorganized with significant though blocking and with religiously preoccupations. Patient seen mumbling to himself.  Patient with no s/h IDeaiton but still with some AH talking of "angels" and denies any VH   
Patient is a 51 year old male who resides with his wife and 13 y/o son, with a past psychiatric history of depression and schizophrenia, following with [facility] and with no history of substance abuse and with a PMH of breast CA / Lymphoma and HTN who was brought in for change in behavior and currently admitted with asymptomatic COVID 19.Patient seen by neurology, CT and EEG done with no acute findings and recommended psych eval.     Patient seen today and found to be disorganized with significant though blocking and minimally verbal. Patient refused to answer questions and as per nursing staff, still screaming and yelling to self at times.   
Patient is a 51 year old male who resides with his wife and 13 y/o son, with a past psychiatric history of depression and schizophrenia, following with [facility] and with no history of substance abuse and with a PMH of breast CA / Lymphoma and HTN who was brought in for change in behavior and currently admitted with asymptomatic COVID 19.Patient seen by neurology, CT and EEG done with no acute findings and recommended psych eval.     Patient seen today and found to be pleasant but still disorganized with significant though blocking and with religiously preoccupations. Patient with no s/h IDeaiton but still with some AH talking of "angels" and denies any VH   
Patient is a 51 year old male who resides with his wife and 13 y/o son, with a past psychiatric history of depression and schizophrenia, following with [facility] and with no history of substance abuse and with a PMH of breast CA / Lymphoma and HTN who was brought in for change in behavior and currently admitted with asymptomatic COVID 19.Patient seen by neurology, CT and EEG done with no acute findings and recommended psych eval.     Patient seen for eval today and still disorganized with significant thought blocking. Patient giving short answers and admits to hearing voices but would not elaborates and when asked about s/h ideation he would not answer.     Nurse reports patient has been bizarre, and found yelling by himself in the room 
Patient is a 51 year old male who resides with his wife and 11 y/o son, with a past psychiatric history of depression and schizophrenia, following with [facility] and with no history of substance abuse and with a PMH of breast CA / Lymphoma and HTN who was brought in for change in behavior and currently admitted with asymptomatic COVID 19.Patient seen by neurology, CT and EEG done with no acute findings and recommended psych eval.     Patient seen today and found to be more organized today but still delusional with AH and [facility] preoccupations. Patient stating she is praying for everyone and talking of being close to God and states he hears the angels and also sees them in the clouds out of his window. As per nursing, patient still yelling by himself  int the room and acting bizarre. Currently with no s/h ideation     Nurse reports patient has been bizarre, and found yelling by himself in the room

## 2022-09-20 NOTE — BH CONSULTATION LIAISON PROGRESS NOTE - NSBHINDICATION_PSY_ALL_CORE
elopement risk and impulsivity 

## 2022-09-20 NOTE — BH CONSULTATION LIAISON PROGRESS NOTE - NSBHASSESSMENTFT_PSY_ALL_CORE
Patient is a 51 year old male who resides with his wife and 11 y/o son, with a past psychiatric history of depression and schizophrenia, following with [facility] and with no history of substance abuse and with a PMH of breast CA / Lymphoma and HTN who was brought in for change in behavior and currently admitted with asymptomatic COVID 19.Patient seen by neurology, CT and EEG done with no acute findings and recommended psych eval.     Patient seen for evaluation today and found to be disorganized with delusions of paranoia, AH and with a labile affect. Patient appears psychiatrically decompensated and would benefit form inpatient psych admission. Discussed with Primary team and patient medically cleared but recently diagnosed with COVID 19 (asymptomatic ) and will likely require quarantine before being transferred     Recs   -recommend to keep on 1 to 1 observation for elopement risk and impulsivity   -Continue Seroquel 200mg PO BID   -Prolixin 50mg LOPEZ ordered for today   -If medically cleared, recommend 2PC admission to inpt psychiatry   -Will follow
Patient is a 51 year old male who resides with his wife and 11 y/o son, with a past psychiatric history of depression and schizophrenia, following with [facility] and with no history of substance abuse and with a PMH of breast CA / Lymphoma and HTN who was brought in for change in behavior and currently admitted with asymptomatic COVID 19.Patient seen by neurology, CT and EEG done with no acute findings and recommended psych eval.     Patient seen for evaluation today and found to be disorganized with delusions of paranoia, AH and with a labile affect. Patient appears psychiatrically decompensated and would benefit form inpatient psych admission. Discussed with Primary team and patient medically cleared but recently diagnosed with COVID 19 (asymptomatic ) and will likely require quarantine before being transferred     Recs   -recommend to keep on 1 to 1 observation for elopement risk and impulsivity   -Continue Seroquel 200mg PO BID   -Prolixin 50mg LOPEZ given 9/14. Next on Oct 13.   - recommend 2PC admission to inpt psychiatry   -Will follow
Patient is a 51 year old male who resides with his wife and 13 y/o son, with a past psychiatric history of depression and schizophrenia, following with [facility] and with no history of substance abuse and with a PMH of breast CA / Lymphoma and HTN who was brought in for change in behavior and currently admitted with asymptomatic COVID 19.Patient seen by neurology, CT and EEG done with no acute findings and recommended psych eval.     Patient seen for evaluation today and found to be disorganized with delusions of paranoia, AH and with a labile affect. Patient appears psychiatrically decompensated and would benefit form inpatient psych admission. Discussed with Primary team and patient medically cleared but recently diagnosed with COVID 19 (asymptomatic ) and will likely require quarantine before being transferred     Recs   -recommend to keep on 1 to 1 observation for elopement risk and impulsivity   -Continue Seroquel 200mg PO BID   -Prolixin 50mg LOPEZ given 9/14. Next on Oct 13.   - recommend 2PC admission to inpt psychiatry   -Will follow
Patient is a 51 year old male who resides with his wife and 13 y/o son, with a past psychiatric history of depression and schizophrenia, following with [facility] and with no history of substance abuse and with a PMH of breast CA / Lymphoma and HTN who was brought in for change in behavior and currently admitted with asymptomatic COVID 19.Patient seen by neurology, CT and EEG done with no acute findings and recommended psych eval.     Patient seen for evaluation today and found to be disorganized with delusions of paranoia, AH and with a labile affect. Patient appears psychiatrically decompensated and would benefit form inpatient psych admission. Discussed with Primary team and patient medically cleared but recently diagnosed with COVID 19 (asymptomatic ) and will likely require quarantine before being transferred     Recs   -recommend to keep on 1 to 1 observation for elopement risk and impulsivity   -Continue Seroquel 200mg PO BID   -Prolixin 50mg LOPEZ given yesterday. Next on Oct 13.   -If medically cleared, recommend 2PC admission to inpt psychiatry   -Will follow
Patient is a 51 year old male who resides with his wife and 11 y/o son, with a past psychiatric history of depression and schizophrenia, following with [facility] and with no history of substance abuse and with a PMH of breast CA / Lymphoma and HTN who was brought in for change in behavior and currently admitted with asymptomatic COVID 19.Patient seen by neurology, CT and EEG done with no acute findings and recommended psych eval.     Patient seen for evaluation today and found to be disorganized with delusions of paranoia, AH and with a labile affect. Patient appears psychiatrically decompensated and would benefit form inpatient psych admission. Discussed with Primary team and patient medically cleared but recently diagnosed with COVID 19 (asymptomatic ) and will likely require quarantine before being transferred     Recs   -recommend to keep on 1 to 1 observation for elopement risk and impulsivity   -Continue Seroquel 200mg PO BID   -Prolixin 50mg LOPEZ given 9/14. Next on Oct 13.   - recommend 2PC admission to inpt psychiatry   -Will follow

## 2022-09-20 NOTE — DISCHARGE NOTE NURSING/CASE MANAGEMENT/SOCIAL WORK - PATIENT PORTAL LINK FT
You can access the FollowMyHealth Patient Portal offered by [facility] by registering at the following website: [email or web address]. By joining [employer]’s FollowMyHealth portal, you will also be able to view your health information using other applications (apps) compatible with our system.

## 2022-11-14 ENCOUNTER — EMERGENCY (EMERGENCY)
Facility: HOSPITAL | Age: 52
LOS: 1 days | Discharge: HOME | End: 2022-11-14
Attending: EMERGENCY MEDICINE
Payer: COMMERCIAL

## 2022-11-14 VITALS
SYSTOLIC BLOOD PRESSURE: 142 MMHG | TEMPERATURE: 97 F | DIASTOLIC BLOOD PRESSURE: 68 MMHG | OXYGEN SATURATION: 95 % | RESPIRATION RATE: 18 BRPM | HEART RATE: 140 BPM

## 2022-11-14 VITALS
OXYGEN SATURATION: 100 % | DIASTOLIC BLOOD PRESSURE: 77 MMHG | RESPIRATION RATE: 18 BRPM | SYSTOLIC BLOOD PRESSURE: 119 MMHG | HEART RATE: 93 BPM

## 2022-11-14 LAB
ALBUMIN SERPL ELPH-MCNC: 3.7 G/DL — SIGNIFICANT CHANGE UP (ref 3.3–5.2)
ALP SERPL-CCNC: 93 U/L — SIGNIFICANT CHANGE UP (ref 40–120)
ALT FLD-CCNC: 32 U/L — SIGNIFICANT CHANGE UP
AMPHET UR-MCNC: NEGATIVE — SIGNIFICANT CHANGE UP
ANION GAP SERPL CALC-SCNC: 11 MMOL/L — SIGNIFICANT CHANGE UP (ref 5–17)
ANISOCYTOSIS BLD QL: SLIGHT — SIGNIFICANT CHANGE UP
APPEARANCE UR: CLEAR — SIGNIFICANT CHANGE UP
APTT BLD: 30.7 SEC — SIGNIFICANT CHANGE UP (ref 27.5–35.5)
AST SERPL-CCNC: 28 U/L — SIGNIFICANT CHANGE UP
BARBITURATES UR SCN-MCNC: NEGATIVE — SIGNIFICANT CHANGE UP
BASOPHILS # BLD AUTO: 0 K/UL — SIGNIFICANT CHANGE UP (ref 0–0.2)
BASOPHILS NFR BLD AUTO: 0 % — SIGNIFICANT CHANGE UP (ref 0–2)
BENZODIAZ UR-MCNC: NEGATIVE — SIGNIFICANT CHANGE UP
BILIRUB SERPL-MCNC: 0.2 MG/DL — LOW (ref 0.4–2)
BILIRUB UR-MCNC: NEGATIVE — SIGNIFICANT CHANGE UP
BLD GP AB SCN SERPL QL: SIGNIFICANT CHANGE UP
BUN SERPL-MCNC: 17.3 MG/DL — SIGNIFICANT CHANGE UP (ref 8–20)
CALCIUM SERPL-MCNC: 9.4 MG/DL — SIGNIFICANT CHANGE UP (ref 8.4–10.5)
CHLORIDE SERPL-SCNC: 99 MMOL/L — SIGNIFICANT CHANGE UP (ref 96–108)
CO2 SERPL-SCNC: 27 MMOL/L — SIGNIFICANT CHANGE UP (ref 22–29)
COCAINE METAB.OTHER UR-MCNC: NEGATIVE — SIGNIFICANT CHANGE UP
COLOR SPEC: YELLOW — SIGNIFICANT CHANGE UP
CREAT SERPL-MCNC: 0.67 MG/DL — SIGNIFICANT CHANGE UP (ref 0.5–1.3)
DIFF PNL FLD: NEGATIVE — SIGNIFICANT CHANGE UP
EGFR: 112 ML/MIN/1.73M2 — SIGNIFICANT CHANGE UP
EOSINOPHIL # BLD AUTO: 0 K/UL — SIGNIFICANT CHANGE UP (ref 0–0.5)
EOSINOPHIL NFR BLD AUTO: 0 % — SIGNIFICANT CHANGE UP (ref 0–6)
GLUCOSE SERPL-MCNC: 89 MG/DL — SIGNIFICANT CHANGE UP (ref 70–99)
GLUCOSE UR QL: NEGATIVE MG/DL — SIGNIFICANT CHANGE UP
HCT VFR BLD CALC: 28.6 % — LOW (ref 39–50)
HGB BLD-MCNC: 10.2 G/DL — LOW (ref 13–17)
INR BLD: 1.06 RATIO — SIGNIFICANT CHANGE UP (ref 0.88–1.16)
KETONES UR-MCNC: NEGATIVE — SIGNIFICANT CHANGE UP
LEUKOCYTE ESTERASE UR-ACNC: NEGATIVE — SIGNIFICANT CHANGE UP
LYMPHOCYTES # BLD AUTO: 0.96 K/UL — LOW (ref 1–3.3)
LYMPHOCYTES # BLD AUTO: 13.1 % — SIGNIFICANT CHANGE UP (ref 13–44)
MACROCYTES BLD QL: SLIGHT — SIGNIFICANT CHANGE UP
MANUAL SMEAR VERIFICATION: SIGNIFICANT CHANGE UP
MCHC RBC-ENTMCNC: 28 PG — SIGNIFICANT CHANGE UP (ref 27–34)
MCHC RBC-ENTMCNC: 35.7 GM/DL — SIGNIFICANT CHANGE UP (ref 32–36)
MCV RBC AUTO: 78.6 FL — LOW (ref 80–100)
METHADONE UR-MCNC: NEGATIVE — SIGNIFICANT CHANGE UP
MONOCYTES # BLD AUTO: 0.39 K/UL — SIGNIFICANT CHANGE UP (ref 0–0.9)
MONOCYTES NFR BLD AUTO: 5.3 % — SIGNIFICANT CHANGE UP (ref 2–14)
NEUTROPHILS # BLD AUTO: 5.91 K/UL — SIGNIFICANT CHANGE UP (ref 1.8–7.4)
NEUTROPHILS NFR BLD AUTO: 80.7 % — HIGH (ref 43–77)
NITRITE UR-MCNC: NEGATIVE — SIGNIFICANT CHANGE UP
OPIATES UR-MCNC: NEGATIVE — SIGNIFICANT CHANGE UP
PCP SPEC-MCNC: SIGNIFICANT CHANGE UP
PCP UR-MCNC: NEGATIVE — SIGNIFICANT CHANGE UP
PH UR: 7 — SIGNIFICANT CHANGE UP (ref 5–8)
PLAT MORPH BLD: NORMAL — SIGNIFICANT CHANGE UP
PLATELET # BLD AUTO: 236 K/UL — SIGNIFICANT CHANGE UP (ref 150–400)
POLYCHROMASIA BLD QL SMEAR: SIGNIFICANT CHANGE UP
POTASSIUM SERPL-MCNC: 4.6 MMOL/L — SIGNIFICANT CHANGE UP (ref 3.5–5.3)
POTASSIUM SERPL-SCNC: 4.6 MMOL/L — SIGNIFICANT CHANGE UP (ref 3.5–5.3)
PROT SERPL-MCNC: 6.8 G/DL — SIGNIFICANT CHANGE UP (ref 6.6–8.7)
PROT UR-MCNC: NEGATIVE — SIGNIFICANT CHANGE UP
PROTHROM AB SERPL-ACNC: 12.3 SEC — SIGNIFICANT CHANGE UP (ref 10.5–13.4)
RBC # BLD: 3.64 M/UL — LOW (ref 4.2–5.8)
RBC # FLD: 15.9 % — HIGH (ref 10.3–14.5)
RBC BLD AUTO: ABNORMAL
SMUDGE CELLS # BLD: PRESENT — SIGNIFICANT CHANGE UP
SODIUM SERPL-SCNC: 137 MMOL/L — SIGNIFICANT CHANGE UP (ref 135–145)
SP GR SPEC: 1.01 — SIGNIFICANT CHANGE UP (ref 1.01–1.02)
THC UR QL: NEGATIVE — SIGNIFICANT CHANGE UP
UROBILINOGEN FLD QL: NEGATIVE MG/DL — SIGNIFICANT CHANGE UP
VARIANT LYMPHS # BLD: 0.9 % — SIGNIFICANT CHANGE UP (ref 0–6)
WBC # BLD: 7.32 K/UL — SIGNIFICANT CHANGE UP (ref 3.8–10.5)
WBC # FLD AUTO: 7.32 K/UL — SIGNIFICANT CHANGE UP (ref 3.8–10.5)

## 2022-11-14 PROCEDURE — 80307 DRUG TEST PRSMV CHEM ANLYZR: CPT

## 2022-11-14 PROCEDURE — G1004: CPT

## 2022-11-14 PROCEDURE — 70450 CT HEAD/BRAIN W/O DYE: CPT | Mod: 26,ME

## 2022-11-14 PROCEDURE — 99285 EMERGENCY DEPT VISIT HI MDM: CPT

## 2022-11-14 PROCEDURE — 81003 URINALYSIS AUTO W/O SCOPE: CPT

## 2022-11-14 PROCEDURE — 86901 BLOOD TYPING SEROLOGIC RH(D): CPT

## 2022-11-14 PROCEDURE — 85025 COMPLETE CBC W/AUTO DIFF WBC: CPT

## 2022-11-14 PROCEDURE — 87186 SC STD MICRODIL/AGAR DIL: CPT

## 2022-11-14 PROCEDURE — 85610 PROTHROMBIN TIME: CPT

## 2022-11-14 PROCEDURE — 86900 BLOOD TYPING SEROLOGIC ABO: CPT

## 2022-11-14 PROCEDURE — 80053 COMPREHEN METABOLIC PANEL: CPT

## 2022-11-14 PROCEDURE — 85730 THROMBOPLASTIN TIME PARTIAL: CPT

## 2022-11-14 PROCEDURE — 99284 EMERGENCY DEPT VISIT MOD MDM: CPT | Mod: 25

## 2022-11-14 PROCEDURE — 96360 HYDRATION IV INFUSION INIT: CPT

## 2022-11-14 PROCEDURE — 87086 URINE CULTURE/COLONY COUNT: CPT

## 2022-11-14 PROCEDURE — 36415 COLL VENOUS BLD VENIPUNCTURE: CPT

## 2022-11-14 PROCEDURE — 70450 CT HEAD/BRAIN W/O DYE: CPT | Mod: ME

## 2022-11-14 PROCEDURE — 86850 RBC ANTIBODY SCREEN: CPT

## 2022-11-14 RX ORDER — SODIUM CHLORIDE 9 MG/ML
1000 INJECTION INTRAMUSCULAR; INTRAVENOUS; SUBCUTANEOUS ONCE
Refills: 0 | Status: COMPLETED | OUTPATIENT
Start: 2022-11-14 | End: 2022-11-14

## 2022-11-14 RX ADMIN — SODIUM CHLORIDE 1000 MILLILITER(S): 9 INJECTION INTRAMUSCULAR; INTRAVENOUS; SUBCUTANEOUS at 09:23

## 2022-11-14 NOTE — ED ADULT TRIAGE NOTE - CHIEF COMPLAINT QUOTE
BIBEMS pt transferred from [facility]. Pt with bizarre behavior due to unknown cause at [facility] was given multiple meds zyprexa, Received 320 mg of ketamine, placed in restraints at facility and one to one. Pt with history of breast Ca, left chest wall port, schizo. Pt currently sleeping, responsive to painful stimuli

## 2022-11-14 NOTE — ED PROVIDER NOTE - PROGRESS NOTE DETAILS
Caro: patient evaluated after sign out. He is awake and calm. I asked if he could give a urine sample - he states "I have only love to give." Fluid bolus hung to facilitate urine sample. Caro: I spoke to Dr. Randall at [facility], report given, he accepts the patient back.

## 2022-11-14 NOTE — CHART NOTE - NSCHARTNOTEFT_GEN_A_CORE
MORA Note: SW made aware by MD pt needs transport back to inpt psych unit at [facility], per chart review pt brought in from [facility] for agitation not resolved by medications. Per [facility] ED MD pt is now medically cleared, accepted back to [facility] by Dr. Randall. SW spoke to [facility] admissions staff, confirmed w/ Zainab pt should be brought back to [facility] ambulance bay and [facility] security will escort pt back to unit.     RN made aware of  transport questions for  EMS, stated call can be transferred to [facility]. MORA requested next available ro via  clerical, [facility],  EMS Billing letter and facesheet left in packet w/ RN, No other SW services identified at this time

## 2022-11-14 NOTE — ED PROVIDER NOTE - OBJECTIVE STATEMENT
53 y/o male with PMHx of Breast CA in remission, HTN, GERD presents to ED BIBA. Patient presets to ED from [facility] for agitation. Patient received 5mg of Haldol, 2mg of Ativan, and 50mg of Benadryl at 2045, 10mg Zyprexa at 11pm, and 320mg Ketamine IV at 0230, and was transferred to [facility] ED because [facility] staff states the IM medications were no longer working for the patient. 51 y/o male with PMHx of Breast CA in remission, HTN, GERD presents to ED BIBA. Patient presets to ED from [facility] for agitation. Patient received 5mg of Haldol, 2mg of Ativan, and 50mg of Benadryl at 2045, 10mg Zyprexa at 11pm, and 320mg Ketamine IV at 0230, and was transferred to [facility] ED because [facility] staff states the IM medications were no longer working for the patient.  denies f/c/s. denies n/v.  PMH: Breast CA in remission, HTN, GERD  SOCIAL; no drug abuse

## 2022-11-14 NOTE — ED ADULT NURSE NOTE - OBJECTIVE STATEMENT
Assumed care of pt at 0330 Pt sleepy but arousable to painful stimuli. Pt was BIBEMS from [facility] after becoming aggressive and combative toward staff where he was given IM Ketamine as per staff member from [facility]. [facility]. Staff member says he has been a patient in the past and has been directable but since CA tx he has not been redirectable and just wont calm down. Resp are even and unlabored, 98% on RA on .

## 2022-11-14 NOTE — ED PROVIDER NOTE - PATIENT PORTAL LINK FT
You can access the FollowMyHealth Patient Portal offered by [facility] by registering at the following website: [email or web address]. By joining [employer]’s FollowMyHealth portal, you will also be able to view your health information using other applications (apps) compatible with our system.

## 2022-11-14 NOTE — ED PROVIDER NOTE - NSFOLLOWUPINSTRUCTIONS_ED_ALL_ED_FT
Dysphoria      Dysphoria is an emotion in which a person feels unpleasant or uncomfortable. It may involve mood changes and feelings of sadness, anxiety, irritability, and restlessness.    Dysphoria may be caused by normal life stress, such as significant changes, and usually goes away after you have adapted to the change. Dysphoria that lasts longer than two weeks may be a symptom of a more serious condition, such as an adjustment disorder, major depression, or an anxiety disorder.      Follow these instructions at home:    Dysphoria may leave you feeling emotionally flat. If this mood lasts more days than not for two weeks you may need to ask for help from your health care provider. The following tips may help you manage your discomfort.      Lifestyle    •Maintain a healthy lifestyle.  •Eat a healthy diet that includes foods such as nuts, fatty fish, fruits, and vegetables.      •Exercise regularly. Aim for about 20 minutes a day of physical activity. Get outside and go for a walk or a bike ride.    •Get enough sleep. Try the following:  •Turning off screens an hour or so before you get ready for sleep.      •Have the room a comfortable temperature for sleeping.      •Limit alcohol before bed.      •Do your best to have a regular time for going to bed and waking up.          •Avoid alcohol and drugs.      • Do not use any products that contain nicotine or tobacco, such as cigarettes, e-cigarettes, and chewing tobacco. If you need help quitting, ask your health care provider.      •Learn ways to reduce and cope with stress, such as yoga and meditation.      •Make time to do things that you enjoy.        General instructions      •Take over-the-counter and prescription medicines only as told by your health care provider.      •Check with your health care provider before taking any herbs or supplements.      •Talk about your feelings with family members or trusted friends.      •Keep all follow-up visits. This is important.      •If your symptoms last for more than two weeks, ask your health care provider to recommend a mental health professional that you can visit.        Contact a health care provider if you:    •Were given medicine and it does not seem to be helping.      •Feel hopeless and overwhelmed.      •Feel like you cannot leave your house.      •Have trouble taking care of yourself.        Get help right away if you:    •Have serious thoughts about hurting yourself or others.      If you ever feel like you may hurt yourself or others, or have thoughts about taking your own life, get help right away. Go to your nearest emergency department or:   • Call your local emergency services (661 in the [place]       • Call a suicide crisis helpline, such as the National Suicide Prevention Lifeline at 1-433.987.8217 or 192 in the [place] This is open 24 hours a day in the [place]       • Text the Crisis Text Line at 174908 (in the [place]         Summary    •Dysphoria is an emotion in which a person feels unpleasant or uncomfortable. It may involve mood changes and feelings of sadness, anxiety, irritability, and restlessness.      •Dysphoria that lasts longer than two weeks may be a symptom of a more serious condition, such as adjustment disorder, major depression, or anxiety disorder.      •Take steps to boost your mood by eating well and getting enough sleep and exercise. Be sure to see a health care provider if your symptoms worsen or do not go away.      This information is not intended to replace advice given to you by your health care provider. Make sure you discuss any questions you have with your health care provider.

## 2022-11-14 NOTE — ED PROVIDER NOTE - PHYSICAL EXAMINATION
General:     NAD, well-nourished, well-appearing  Head:     NC/AT, EOMI, oral mucosa moist  Neck:     trachea midline  Lungs:     CTA b/l, no w/r/r  CVS:     S1S2, RRR, no m/g/r  Abd:     +BS, s/nt/nd, no organomegaly  Ext:    2+ radial and pedal pulses, no c/c/e  Neuro: AAOx3, no sensory/motor deficits   Skin: chest wall clean, no erythema over port site

## 2022-11-14 NOTE — ED ADULT NURSE NOTE - CHIEF COMPLAINT QUOTE
BIBEMS pt transferred from [facility]. Pt with bizarre behavior due to unknown cause at [facility] was given multiple meds zyprexa, Received 320 mg of ketamine, placed in restraints at facility and one to one. Pt with history of breast Ca, left chest wall port, schizo. Pt currently sleeping, responsive to painful stimuli

## 2022-11-25 ENCOUNTER — INPATIENT (INPATIENT)
Facility: HOSPITAL | Age: 52
LOS: 12 days | Discharge: PSYCHIATRIC OR SUBSTANCE USE FACILITY | DRG: 885 | End: 2022-12-08
Attending: HOSPITALIST | Admitting: STUDENT IN AN ORGANIZED HEALTH CARE EDUCATION/TRAINING PROGRAM
Payer: COMMERCIAL

## 2022-11-25 VITALS
DIASTOLIC BLOOD PRESSURE: 84 MMHG | OXYGEN SATURATION: 97 % | HEART RATE: 100 BPM | RESPIRATION RATE: 18 BRPM | SYSTOLIC BLOOD PRESSURE: 118 MMHG | TEMPERATURE: 98 F

## 2022-11-25 DIAGNOSIS — R41.0 DISORIENTATION, UNSPECIFIED: ICD-10-CM

## 2022-11-25 DIAGNOSIS — R41.82 ALTERED MENTAL STATUS, UNSPECIFIED: ICD-10-CM
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
## 2022-11-25 DIAGNOSIS — Z85.3 PERSONAL HISTORY OF MALIGNANT NEOPLASM OF BREAST: ICD-10-CM

## 2022-11-25 DIAGNOSIS — R46.89 OTHER SYMPTOMS AND SIGNS INVOLVING APPEARANCE AND BEHAVIOR: ICD-10-CM

## 2022-11-25 DIAGNOSIS — I10 ESSENTIAL (PRIMARY) HYPERTENSION: ICD-10-CM

## 2022-11-25 LAB
ALBUMIN SERPL ELPH-MCNC: 3.5 G/DL — SIGNIFICANT CHANGE UP (ref 3.3–5.2)
ALP SERPL-CCNC: 104 U/L — SIGNIFICANT CHANGE UP (ref 40–120)
ALT FLD-CCNC: 25 U/L — SIGNIFICANT CHANGE UP
ANION GAP SERPL CALC-SCNC: 11 MMOL/L — SIGNIFICANT CHANGE UP (ref 5–17)
AST SERPL-CCNC: 37 U/L — SIGNIFICANT CHANGE UP
BASOPHILS # BLD AUTO: 0.04 K/UL — SIGNIFICANT CHANGE UP (ref 0–0.2)
BASOPHILS NFR BLD AUTO: 0.4 % — SIGNIFICANT CHANGE UP (ref 0–2)
BILIRUB SERPL-MCNC: 0.3 MG/DL — LOW (ref 0.4–2)
BUN SERPL-MCNC: 15.7 MG/DL — SIGNIFICANT CHANGE UP (ref 8–20)
CALCIUM SERPL-MCNC: 9.5 MG/DL — SIGNIFICANT CHANGE UP (ref 8.4–10.5)
CHLORIDE SERPL-SCNC: 97 MMOL/L — SIGNIFICANT CHANGE UP (ref 96–108)
CO2 SERPL-SCNC: 26 MMOL/L — SIGNIFICANT CHANGE UP (ref 22–29)
CREAT SERPL-MCNC: 0.62 MG/DL — SIGNIFICANT CHANGE UP (ref 0.5–1.3)
CRP SERPL-MCNC: 57 MG/L — HIGH
EGFR: 114 ML/MIN/1.73M2 — SIGNIFICANT CHANGE UP
EOSINOPHIL # BLD AUTO: 0.07 K/UL — SIGNIFICANT CHANGE UP (ref 0–0.5)
EOSINOPHIL NFR BLD AUTO: 0.8 % — SIGNIFICANT CHANGE UP (ref 0–6)
GLUCOSE SERPL-MCNC: 104 MG/DL — HIGH (ref 70–99)
HCT VFR BLD CALC: 29.7 % — LOW (ref 39–50)
HGB BLD-MCNC: 10.8 G/DL — LOW (ref 13–17)
IMM GRANULOCYTES NFR BLD AUTO: 1.1 % — HIGH (ref 0–0.9)
LYMPHOCYTES # BLD AUTO: 1.23 K/UL — SIGNIFICANT CHANGE UP (ref 1–3.3)
LYMPHOCYTES # BLD AUTO: 13.8 % — SIGNIFICANT CHANGE UP (ref 13–44)
MCHC RBC-ENTMCNC: 28.1 PG — SIGNIFICANT CHANGE UP (ref 27–34)
MCHC RBC-ENTMCNC: 36.4 GM/DL — HIGH (ref 32–36)
MCV RBC AUTO: 77.3 FL — LOW (ref 80–100)
MONOCYTES # BLD AUTO: 0.67 K/UL — SIGNIFICANT CHANGE UP (ref 0–0.9)
MONOCYTES NFR BLD AUTO: 7.5 % — SIGNIFICANT CHANGE UP (ref 2–14)
NEUTROPHILS # BLD AUTO: 6.82 K/UL — SIGNIFICANT CHANGE UP (ref 1.8–7.4)
NEUTROPHILS NFR BLD AUTO: 76.4 % — SIGNIFICANT CHANGE UP (ref 43–77)
PLATELET # BLD AUTO: 333 K/UL — SIGNIFICANT CHANGE UP (ref 150–400)
POTASSIUM SERPL-MCNC: 5.1 MMOL/L — SIGNIFICANT CHANGE UP (ref 3.5–5.3)
POTASSIUM SERPL-SCNC: 5.1 MMOL/L — SIGNIFICANT CHANGE UP (ref 3.5–5.3)
PROT SERPL-MCNC: 7.6 G/DL — SIGNIFICANT CHANGE UP (ref 6.6–8.7)
RBC # BLD: 3.84 M/UL — LOW (ref 4.2–5.8)
RBC # FLD: 16.1 % — HIGH (ref 10.3–14.5)
SODIUM SERPL-SCNC: 134 MMOL/L — LOW (ref 135–145)
TROPONIN T SERPL-MCNC: <0.01 NG/ML — SIGNIFICANT CHANGE UP (ref 0–0.06)
WBC # BLD: 8.93 K/UL — SIGNIFICANT CHANGE UP (ref 3.8–10.5)
WBC # FLD AUTO: 8.93 K/UL — SIGNIFICANT CHANGE UP (ref 3.8–10.5)

## 2022-11-25 PROCEDURE — 72125 CT NECK SPINE W/O DYE: CPT | Mod: 26,MA

## 2022-11-25 PROCEDURE — 99221 1ST HOSP IP/OBS SF/LOW 40: CPT

## 2022-11-25 PROCEDURE — 70450 CT HEAD/BRAIN W/O DYE: CPT | Mod: 26,MA

## 2022-11-25 PROCEDURE — 99285 EMERGENCY DEPT VISIT HI MDM: CPT

## 2022-11-25 PROCEDURE — 93010 ELECTROCARDIOGRAM REPORT: CPT

## 2022-11-25 PROCEDURE — 99223 1ST HOSP IP/OBS HIGH 75: CPT

## 2022-11-25 PROCEDURE — 71260 CT THORAX DX C+: CPT | Mod: 26,MA

## 2022-11-25 PROCEDURE — 74177 CT ABD & PELVIS W/CONTRAST: CPT | Mod: 26,MA

## 2022-11-25 PROCEDURE — 71045 X-RAY EXAM CHEST 1 VIEW: CPT | Mod: 26

## 2022-11-25 RX ORDER — OMEPRAZOLE 10 MG/1
1 CAPSULE, DELAYED RELEASE ORAL
Qty: 0 | Refills: 0 | DISCHARGE

## 2022-11-25 RX ORDER — ONDANSETRON 8 MG/1
1 TABLET, FILM COATED ORAL
Qty: 0 | Refills: 0 | DISCHARGE

## 2022-11-25 RX ORDER — POLYETHYLENE GLYCOL 3350 17 G/17G
17 POWDER, FOR SOLUTION ORAL DAILY
Refills: 0 | Status: DISCONTINUED | OUTPATIENT
Start: 2022-11-25 | End: 2022-12-08

## 2022-11-25 RX ORDER — LISINOPRIL 2.5 MG/1
1 TABLET ORAL
Qty: 0 | Refills: 0 | DISCHARGE

## 2022-11-25 RX ORDER — FERROUS SULFATE 325(65) MG
1 TABLET ORAL
Qty: 0 | Refills: 0 | DISCHARGE

## 2022-11-25 RX ORDER — QUETIAPINE FUMARATE 200 MG/1
1 TABLET, FILM COATED ORAL
Qty: 0 | Refills: 0 | DISCHARGE

## 2022-11-25 RX ORDER — LISINOPRIL 2.5 MG/1
2.5 TABLET ORAL DAILY
Refills: 0 | Status: DISCONTINUED | OUTPATIENT
Start: 2022-11-25 | End: 2022-12-08

## 2022-11-25 RX ORDER — PANTOPRAZOLE SODIUM 20 MG/1
40 TABLET, DELAYED RELEASE ORAL
Refills: 0 | Status: DISCONTINUED | OUTPATIENT
Start: 2022-11-25 | End: 2022-12-08

## 2022-11-25 RX ORDER — FLUPHENAZINE HYDROCHLORIDE 1 MG/1
50 TABLET, FILM COATED ORAL
Qty: 0 | Refills: 0 | DISCHARGE

## 2022-11-25 RX ORDER — CHOLECALCIFEROL (VITAMIN D3) 125 MCG
2000 CAPSULE ORAL DAILY
Refills: 0 | Status: DISCONTINUED | OUTPATIENT
Start: 2022-11-25 | End: 2022-12-08

## 2022-11-25 RX ORDER — ASCORBIC ACID 60 MG
500 TABLET,CHEWABLE ORAL DAILY
Refills: 0 | Status: DISCONTINUED | OUTPATIENT
Start: 2022-11-25 | End: 2022-12-08

## 2022-11-25 RX ORDER — HEPARIN SODIUM 5000 [USP'U]/ML
5000 INJECTION INTRAVENOUS; SUBCUTANEOUS EVERY 12 HOURS
Refills: 0 | Status: DISCONTINUED | OUTPATIENT
Start: 2022-11-25 | End: 2022-12-08

## 2022-11-25 RX ORDER — SENNA PLUS 8.6 MG/1
2 TABLET ORAL AT BEDTIME
Refills: 0 | Status: DISCONTINUED | OUTPATIENT
Start: 2022-11-25 | End: 2022-12-08

## 2022-11-25 RX ORDER — HALOPERIDOL DECANOATE 100 MG/ML
5 INJECTION INTRAMUSCULAR EVERY 6 HOURS
Refills: 0 | Status: DISCONTINUED | OUTPATIENT
Start: 2022-11-25 | End: 2022-12-08

## 2022-11-25 RX ORDER — HALOPERIDOL DECANOATE 100 MG/ML
5 INJECTION INTRAMUSCULAR DAILY
Refills: 0 | Status: DISCONTINUED | OUTPATIENT
Start: 2022-11-26 | End: 2022-11-28

## 2022-11-25 RX ORDER — ACETAMINOPHEN 500 MG
650 TABLET ORAL EVERY 6 HOURS
Refills: 0 | Status: DISCONTINUED | OUTPATIENT
Start: 2022-11-25 | End: 2022-12-08

## 2022-11-25 RX ADMIN — SENNA PLUS 2 TABLET(S): 8.6 TABLET ORAL at 22:43

## 2022-11-25 RX ADMIN — Medication 1 MILLIGRAM(S): at 22:43

## 2022-11-25 NOTE — CONSULT NOTE ADULT - SUBJECTIVE AND OBJECTIVE BOX
Patient is a 52yoM with PMH breast cancer, schizoaffective disorder, HTN, lymphoma who presents to the ED with recent behavioral changes and falls. Patient says he fell, unable to recall when, mentions mild pain in R anterior chest wall. Patient is poor historian, history provided from chart review. Patient denies any headache, chest pain, SOB, abdominal pain, nausea, vomiting.     PMH: breast cancer, schizoaffective disorder, HTN, lymphoma  PSH: chemoport  Allergies: NKDA  SH: unknown    Vitals:  Vital Signs Last 24 Hrs  T(C): 36.8 (25 Nov 2022 19:23), Max: 36.8 (25 Nov 2022 19:23)  T(F): 98.2 (25 Nov 2022 19:23), Max: 98.2 (25 Nov 2022 19:23)  HR: 97 (25 Nov 2022 19:23) (97 - 100)  BP: 141/88 (25 Nov 2022 19:23) (118/84 - 141/88)  BP(mean): --  RR: 20 (25 Nov 2022 19:23) (18 - 20)  SpO2: 99% (25 Nov 2022 19:23) (97% - 99%)    Parameters below as of 25 Nov 2022 15:25  Patient On (Oxygen Delivery Method): room air    Labs:  11-25    134<L>  |  97  |  15.7  ----------------------------<  104<H>  5.1   |  26.0  |  0.62    Ca    9.5      25 Nov 2022 17:55    TPro  7.6  /  Alb  3.5  /  TBili  0.3<L>  /  DBili  x   /  AST  37  /  ALT  25  /  AlkPhos  104  11-25                        10.8   8.93  )-----------( 333      ( 25 Nov 2022 17:55 )             29.7     Physical Exam:  Gen: pt lying in bed, alert, in NAD  Resp: unlabored  CVS: RRR  Abd: soft, NT, ND  Ext: moving all extremities spontaneously, sensation intact, pulses 2+

## 2022-11-25 NOTE — ED ADULT NURSE REASSESSMENT NOTE - NS ED NURSE REASSESS COMMENT FT1
Assumed care from previous RN. Patient is calm and cooperative at this time with wife at bedside as well as facility and staff 1:1. Patient aware of admission and POC. Will continue to monitor.

## 2022-11-25 NOTE — H&P ADULT - NSICDXFAMILYHX_GEN_ALL_CORE_FT
FAMILY HISTORY:  Patient unable to provide medical history     FAMILY HISTORY:  Patient unable to provide medical history, pt. not give goood hsitory very likely due to his current disorganized behaviour.

## 2022-11-25 NOTE — ED PROVIDER NOTE - PROGRESS NOTE DETAILS
Trauma team consulted for rib fx. Discussed with Dr. Sims - she is very concerned about possible mets to brain and would like MRI if CT brain negative. Yasmin Son MD Attending Physician-     Trauma team consulted for rib fx.

## 2022-11-25 NOTE — H&P ADULT - HISTORY OF PRESENT ILLNESS
pt. 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 pt's wife, Mahi Jean Baptiste [[phone]] has reported that Pt was calm and pleasant person and able to manage ADL himself.  Pt saw Dr Pinto with [facility] [[phone]] previously. Pt was diagnosed with breast cancer and needed 12 rounds of chemo. However, Pt was hospitalized with 2 rounds of chemo left. Pt has 2 weeks of paclitaxel lef pt. 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 and behavioral change possibly CA metastasis to brain ?  pt's wife, Mahi Jean Baptiste [[phone]] has reported that Pt was calm and pleasant person and able to manage ADL himself.  Pt saw Dr Pinto with [facility] [[phone]] previously. Pt was diagnosed with breast cancer and needed 12 rounds of chemo. However, Pt was hospitalized with 2 rounds of chemo left. Pt has 2 weeks of paclitaxel left. pt. is currently lying in bed quietly. answering questions and follows commands. reports no complaints, pt. stated that he fell but no LOC, no dizziness per history. no cp, no abd. pain, no n/v/d.  pt. 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 and behavioral change possibly CA metastasis to brain ?    Pt saw Dr Pinto with [facility] [[phone]] previously. Pt was diagnosed with breast cancer and needed 12 rounds of chemo. However, Pt was hospitalized with 2 rounds of chemo left. Pt has 2 weeks of paclitaxel left. pt. is currently lying in bed quietly. answering questions and follows commands. reports no complaints, pt. stated that he fell but no LOC, no dizziness per history. no cp, no abd. pain, no n/v/d.  pt. is a 51 y/o male with PMHx of breast CA in remission, lymphoma, HTN, treatment-resistant schizoaffective disorder depressive type, is sent from Dr Sims at [facility] concerning about recently worsened disorganized behavior and a fall today, elevated CRP clozapine-induced myocarditis ? behavioral change, metastasis to brain ?    Pt saw Dr Pinto with [facility] [[phone]] previously. Pt was diagnosed with breast cancer and needed 12 rounds of chemo. However, Pt was hospitalized with 2 rounds of chemo left. Pt has 2 weeks of paclitaxel left. pt. is currently lying in bed quietly. answering questions , follows commands but does not know why he is in the hospital . reports no complaints, pt. stated that he fell but no LOC, no dizziness per history. no cp, no abd. pain, no n/v/d. pt. pt. is on 1 :1 at his facility and is is on 1: 1in the ER as well.

## 2022-11-25 NOTE — H&P ADULT - NSICDXPASTMEDICALHX_GEN_ALL_CORE_FT
PAST MEDICAL HISTORY:  Breast cancer     Lymphoma     Major depression      PAST MEDICAL HISTORY:  Breast cancer     Lymphoma     Major depression schizoaffective disorder ?

## 2022-11-25 NOTE — ED PROVIDER NOTE - OBJECTIVE STATEMENT
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 Per a report form [facility], the pt's wife, Mahi Jean Baptiste [[phone]] reports Pt was calm and pleasant person and able to manage ADL himself.  Pt saw Dr Pinto with [facility] [[phone]] previously. Pt was diagnosed with breast cancer and needed 12 rounds of chemo. However, Pt was hospitalized with 2 rounds of chemo left. Pt has 2 weeks of paclitaxel left. 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 Per a report form [facility], the pt's wife, Mahi Jean Baptiste [[phone]] reports Pt was calm and pleasant person and able to manage ADL himself.  Pt saw Dr Pinto with [facility] [[phone]] previously. Pt was diagnosed with breast cancer and needed 12 rounds of chemo. However, Pt was hospitalized with 2 rounds of chemo left. Per attending Huy discussion with Dr. Sims, patient had to have chemo interrupted in Sept. Pt has 2 weeks of paclitaxel left.

## 2022-11-25 NOTE — H&P ADULT - NSHPSOURCEINFOTX_GEN_ALL_CORE
pt's father at bedside pt's father at bedside, pt's wife, Mahi Jean Baptiste [[phone]] not in the ER at the time of admission.

## 2022-11-25 NOTE — ED PROVIDER NOTE - WR INTERPRETATION 1
ADMIT
CXR negative - No infiltrates, No consolidation, No atelectasis seenCXR negative - No CHF, No cardiomegaly, No pleural effusions

## 2022-11-25 NOTE — CONSULT NOTE ADULT - ATTENDING COMMENTS
I have seen and examined this patient at bedside.  52 year old male who presents with multiple falls.  Found to have a right 7th rib fracture.  On exam, he has no pain or tenderness.  He is cleared from a trauma surgery perspective.

## 2022-11-25 NOTE — ED ADULT NURSE NOTE - CHIEF COMPLAINT QUOTE
PT BIBA from [facility] s/p mechanical fall. Hit head on ground. Denies LOC denies blood thinners

## 2022-11-25 NOTE — H&P ADULT - PROBLEM SELECTOR PLAN 2
worsening underlying psych condition related ? mets to brain ? follow mri. 1: 1 , psych consult.  labs unremarkable except crp 57, might be related to his malignancy, physiatrist concern for myocarditis per history, was on clozapine before. will get echo. clinically pt. with no cp, no sob. no fever. trop negative x 1 , ekg no acute changes. worsening underlying psych condition related ? mets to brain ? follow mri. 1: 1 , psych consult.  labs unremarkable except crp 57, might be related to his malignancy, physiatrist concern for myocarditis per history, was on clozapine before. will get echo. clinically pt. with no cp, no sob. no fever. trop negative x 1 , ekg no acute changes.  will get vitamin b12, folate, tft.

## 2022-11-25 NOTE — H&P ADULT - ASSESSMENT
pt. is a 51 y/o male with PMHx of breast CA in remission, lymphoma, HTN, treatment-resistant schizoaffective disorder depressive type, sent in after a fall and recently with worsening disorganized behaviour.

## 2022-11-25 NOTE — ED ADULT TRIAGE NOTE - CHIEF COMPLAINT QUOTE
PT BIBA from [facility] s/p mechanical fall. Hit head on ground. Denies LOC denies blood thinners

## 2022-11-25 NOTE — ED PROVIDER NOTE - CROS ED GU ALL NEG
[FreeTextEntry3] : All medical record entries made by the scribe were at my, Dr. Nav Herrera, direction and personally dictated by me on 12/20/2019. I have reviewed the chart and agree that the record accurately reflects my personal performance of the history, physical exam, assessment and plan. I have also personally directed, reviewed, and agreed with the chart.  negative...

## 2022-11-25 NOTE — CONSULT NOTE ADULT - ASSESSMENT
Patient is a 52yoM with PMH breast cancer, schizoaffective disorder, HTN, lymphoma who presents to the ED with recent behavioral changes and falls. Patient says he fell, unable to recall when, mentions mild pain in R anterior chest wall. Patient is poor historian, history provided from chart review. Patient denies any headache, chest pain, SOB, abdominal pain, nausea, vomiting.     In the ED, patient is afebrile and HD stable, bloodwork unremarkable. CT head negative, CT C-spine negative, CT C/A/P showing acute R 7th rib fracture and old/healing L 5-7 rib fractures    Plan:  -no trauma surgical intervention indicated at this time  -no further injuries noted on exam  -continue supportive care  -continue care per primary team  -seen and discussed with Dr. Larson

## 2022-11-25 NOTE — H&P ADULT - NSHPPHYSICALEXAM_GEN_ALL_CORE
Vital Signs Last 24 Hrs  T(C): 36.8 (25 Nov 2022 19:23), Max: 36.8 (25 Nov 2022 19:23)  T(F): 98.2 (25 Nov 2022 19:23), Max: 98.2 (25 Nov 2022 19:23)  HR: 97 (25 Nov 2022 19:23) (97 - 100)  BP: 141/88 (25 Nov 2022 19:23) (118/84 - 141/88)  BP(mean): --  RR: 20 (25 Nov 2022 19:23) (18 - 20)  SpO2: 99% (25 Nov 2022 19:23) (97% - 99%)    Parameters below as of 25 Nov 2022 15:25  Patient On (Oxygen Delivery Method): room air    General: pt. lying in bed not in distress  eyes :  AT, NC. PERRL.  HENT: intact EOM. no throat erythema or exudate.   Neck: supple. no JVD.   Chest: CTA bilaterally, no w /r/r.  Heart: S1,S2. RRR. no heart murmur. no edema.  Abdomen: soft. non-tender. non-distended. + BS.   Ext: no calf tenderness. ROM of all ext. intact.  Neuro: Alert, awake, no focal weakness. no speech disorder. cns intact. can follow commands but does not know where he is at.  Skin: warm and dry, lt. upper chest wall port.  psych : flat affect, no agitation at this point, no si/hi. Vital Signs Last 24 Hrs  T(C): 36.8 (25 Nov 2022 19:23), Max: 36.8 (25 Nov 2022 19:23)  T(F): 98.2 (25 Nov 2022 19:23), Max: 98.2 (25 Nov 2022 19:23)  HR: 97 (25 Nov 2022 19:23) (97 - 100)  BP: 141/88 (25 Nov 2022 19:23) (118/84 - 141/88)  BP(mean): --  RR: 20 (25 Nov 2022 19:23) (18 - 20)  SpO2: 99% (25 Nov 2022 19:23) (97% - 99%)    Parameters below as of 25 Nov 2022 15:25  Patient On (Oxygen Delivery Method): room air    General: pt. lying in bed not in distress  eyes :  AT, NC. PERRL.  HENT: intact EOM. no throat erythema or exudate.   Neck: supple. no JVD.   Chest: CTA bilaterally, no w /r/r.  Heart: S1,S2. RRR. no heart murmur. no edema.  Abdomen: soft. non-tender. non-distended. + BS.   Ext: no calf tenderness. ROM of all ext. intact.  Neuro: Alert, awake, no focal weakness. no speech disorder. cns intact. can follow commands but does not know where he is at. knows his  name / date of birth, was able to recognize his father.   Skin: warm and dry, lt. upper chest wall port.  psych : flat affect, no agitation at this point, no si/hi.

## 2022-11-25 NOTE — ED ADULT NURSE NOTE - OBJECTIVE STATEMENT
patient sent in from [facility] for mechanical fall, hitting head. no obvious signs of injury.  patient shaking head yes/no to questions. [facility] aide at bedside. patient is not on blood thinners. patient sent in from [facility] for mechanical fall, hitting head. no obvious signs of injury.  [facility] aide at bedside. patient is not on blood thinners.  patient responding appropriately to questions, mostly responding to yes/no questions by shaking head. IV placed, awaiting CT scan.

## 2022-11-25 NOTE — H&P ADULT - PROBLEM SELECTOR PLAN 3
frequent falls ? fall precautions, ct head/ neck no acute findings.   Acute minimally displaced right lateral seventh rib fracture. Healing   distal left rib fractures.  tylenol prn for pain, incentive spirometry.

## 2022-11-25 NOTE — ED PROVIDER NOTE - CLINICAL SUMMARY MEDICAL DECISION MAKING FREE TEXT BOX
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  Check cbc, cmp, CRP, tropT, ekg, CT whole body. Reassess. 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  Patient without complaints. No external signs of trauma, but given falls will get CT trauma protocol, CT head and c spine as well. Check cbc, cmp, CRP, tropT, ekg,  Reassess. Will admit for MRI if CT head negative, not candidate for obs given active psychiatric symptoms.

## 2022-11-25 NOTE — H&P ADULT - PROBLEM SELECTOR PLAN 1
worsening schizoaffective disorder ? mets to brain ? will get mri brain with and without contrast and further plan per findings.  will request psych consult. continue his current psych meds at this point.

## 2022-11-26 DIAGNOSIS — I40.9 ACUTE MYOCARDITIS, UNSPECIFIED: ICD-10-CM

## 2022-11-26 DIAGNOSIS — I51.4 MYOCARDITIS, UNSPECIFIED: ICD-10-CM

## 2022-11-26 LAB
ANION GAP SERPL CALC-SCNC: 12 MMOL/L — SIGNIFICANT CHANGE UP (ref 5–17)
BASOPHILS # BLD AUTO: 0.03 K/UL — SIGNIFICANT CHANGE UP (ref 0–0.2)
BASOPHILS NFR BLD AUTO: 0.5 % — SIGNIFICANT CHANGE UP (ref 0–2)
BUN SERPL-MCNC: 14.2 MG/DL — SIGNIFICANT CHANGE UP (ref 8–20)
CALCIUM SERPL-MCNC: 9.7 MG/DL — SIGNIFICANT CHANGE UP (ref 8.4–10.5)
CHLORIDE SERPL-SCNC: 95 MMOL/L — LOW (ref 96–108)
CO2 SERPL-SCNC: 26 MMOL/L — SIGNIFICANT CHANGE UP (ref 22–29)
CREAT SERPL-MCNC: 0.66 MG/DL — SIGNIFICANT CHANGE UP (ref 0.5–1.3)
EGFR: 112 ML/MIN/1.73M2 — SIGNIFICANT CHANGE UP
EOSINOPHIL # BLD AUTO: 0.09 K/UL — SIGNIFICANT CHANGE UP (ref 0–0.5)
EOSINOPHIL NFR BLD AUTO: 1.4 % — SIGNIFICANT CHANGE UP (ref 0–6)
FERRITIN SERPL-MCNC: 305 NG/ML — SIGNIFICANT CHANGE UP (ref 30–400)
FOLATE SERPL-MCNC: 14.5 NG/ML — SIGNIFICANT CHANGE UP
GLUCOSE SERPL-MCNC: 94 MG/DL — SIGNIFICANT CHANGE UP (ref 70–99)
HCT VFR BLD CALC: 32.2 % — LOW (ref 39–50)
HGB BLD-MCNC: 11.4 G/DL — LOW (ref 13–17)
IMM GRANULOCYTES NFR BLD AUTO: 1.1 % — HIGH (ref 0–0.9)
IRON SATN MFR SERPL: 24 UG/DL — LOW (ref 59–158)
IRON SATN MFR SERPL: 7 % — LOW (ref 16–55)
LYMPHOCYTES # BLD AUTO: 1.3 K/UL — SIGNIFICANT CHANGE UP (ref 1–3.3)
LYMPHOCYTES # BLD AUTO: 19.8 % — SIGNIFICANT CHANGE UP (ref 13–44)
MCHC RBC-ENTMCNC: 27.4 PG — SIGNIFICANT CHANGE UP (ref 27–34)
MCHC RBC-ENTMCNC: 35.4 GM/DL — SIGNIFICANT CHANGE UP (ref 32–36)
MCV RBC AUTO: 77.4 FL — LOW (ref 80–100)
MONOCYTES # BLD AUTO: 0.52 K/UL — SIGNIFICANT CHANGE UP (ref 0–0.9)
MONOCYTES NFR BLD AUTO: 7.9 % — SIGNIFICANT CHANGE UP (ref 2–14)
NEUTROPHILS # BLD AUTO: 4.56 K/UL — SIGNIFICANT CHANGE UP (ref 1.8–7.4)
NEUTROPHILS NFR BLD AUTO: 69.3 % — SIGNIFICANT CHANGE UP (ref 43–77)
PLATELET # BLD AUTO: 332 K/UL — SIGNIFICANT CHANGE UP (ref 150–400)
POTASSIUM SERPL-MCNC: 4.1 MMOL/L — SIGNIFICANT CHANGE UP (ref 3.5–5.3)
POTASSIUM SERPL-SCNC: 4.1 MMOL/L — SIGNIFICANT CHANGE UP (ref 3.5–5.3)
RBC # BLD: 4.16 M/UL — LOW (ref 4.2–5.8)
RBC # BLD: 4.16 M/UL — LOW (ref 4.2–5.8)
RBC # FLD: 16 % — HIGH (ref 10.3–14.5)
RETICS #: 46.2 K/UL — SIGNIFICANT CHANGE UP (ref 25–125)
RETICS/RBC NFR: 1.1 % — SIGNIFICANT CHANGE UP (ref 0.5–2.5)
SARS-COV-2 RNA SPEC QL NAA+PROBE: SIGNIFICANT CHANGE UP
SODIUM SERPL-SCNC: 133 MMOL/L — LOW (ref 135–145)
T3 SERPL-MCNC: 108 NG/DL — SIGNIFICANT CHANGE UP (ref 80–200)
T4 AB SER-ACNC: 6.9 UG/DL — SIGNIFICANT CHANGE UP (ref 4.5–12)
TIBC SERPL-MCNC: 347 UG/DL — SIGNIFICANT CHANGE UP (ref 220–430)
TRANSFERRIN SERPL-MCNC: 243 MG/DL — SIGNIFICANT CHANGE UP (ref 180–329)
TSH SERPL-MCNC: 1.92 UIU/ML — SIGNIFICANT CHANGE UP (ref 0.27–4.2)
VIT B12 SERPL-MCNC: 314 PG/ML — SIGNIFICANT CHANGE UP (ref 232–1245)
WBC # BLD: 6.57 K/UL — SIGNIFICANT CHANGE UP (ref 3.8–10.5)
WBC # FLD AUTO: 6.57 K/UL — SIGNIFICANT CHANGE UP (ref 3.8–10.5)

## 2022-11-26 PROCEDURE — 70553 MRI BRAIN STEM W/O & W/DYE: CPT | Mod: 26

## 2022-11-26 PROCEDURE — 99222 1ST HOSP IP/OBS MODERATE 55: CPT

## 2022-11-26 PROCEDURE — 99232 SBSQ HOSP IP/OBS MODERATE 35: CPT

## 2022-11-26 RX ORDER — FERROUS SULFATE 325(65) MG
325 TABLET ORAL DAILY
Refills: 0 | Status: DISCONTINUED | OUTPATIENT
Start: 2022-11-26 | End: 2022-12-08

## 2022-11-26 RX ORDER — PREGABALIN 225 MG/1
1000 CAPSULE ORAL DAILY
Refills: 0 | Status: COMPLETED | OUTPATIENT
Start: 2022-11-26 | End: 2022-11-30

## 2022-11-26 RX ADMIN — Medication 1 MILLIGRAM(S): at 14:27

## 2022-11-26 RX ADMIN — HEPARIN SODIUM 5000 UNIT(S): 5000 INJECTION INTRAVENOUS; SUBCUTANEOUS at 18:51

## 2022-11-26 RX ADMIN — HALOPERIDOL DECANOATE 5 MILLIGRAM(S): 100 INJECTION INTRAMUSCULAR at 11:15

## 2022-11-26 RX ADMIN — POLYETHYLENE GLYCOL 3350 17 GRAM(S): 17 POWDER, FOR SOLUTION ORAL at 11:16

## 2022-11-26 RX ADMIN — SENNA PLUS 2 TABLET(S): 8.6 TABLET ORAL at 23:06

## 2022-11-26 RX ADMIN — PANTOPRAZOLE SODIUM 40 MILLIGRAM(S): 20 TABLET, DELAYED RELEASE ORAL at 07:56

## 2022-11-26 RX ADMIN — Medication 500 MILLIGRAM(S): at 11:15

## 2022-11-26 RX ADMIN — PREGABALIN 1000 MICROGRAM(S): 225 CAPSULE ORAL at 15:45

## 2022-11-26 RX ADMIN — Medication 2000 UNIT(S): at 11:15

## 2022-11-26 RX ADMIN — LISINOPRIL 2.5 MILLIGRAM(S): 2.5 TABLET ORAL at 05:48

## 2022-11-26 RX ADMIN — Medication 1 MILLIGRAM(S): at 05:49

## 2022-11-26 RX ADMIN — HEPARIN SODIUM 5000 UNIT(S): 5000 INJECTION INTRAVENOUS; SUBCUTANEOUS at 05:49

## 2022-11-26 RX ADMIN — Medication 1 MILLIGRAM(S): at 23:06

## 2022-11-26 NOTE — PATIENT PROFILE ADULT - FUNCTIONAL ASSESSMENT - BASIC MOBILITY 6.
4-calculated by average/Not able to assess (calculate score using [facility] averaging method)

## 2022-11-26 NOTE — CONSULT NOTE ADULT - ASSESSMENT
pt. is a 53 y/o male with PMHx of breast CA in remission, lymphoma, HTN, treatment-resistant schizoaffective disorder depressive type, sent in from [facility] facility  after a fall and recently with worsening disorganized behaviour. His labs were unremarkable except crp 57, might be related to his malignancy, physiatrist concerned about possible  myocarditis per history recent chemo and two rounds left, was on clozapine before. echo completed and EF 45-50%.   Cardiology called to consult     pt. is a 51 y/o male with PMHx of breast CA in remission, lymphoma, HTN, treatment-resistant schizoaffective disorder depressive type, sent in from phychiatric facility  after a fall and recently with worsening disorganized behaviour. His labs were unremarkable except crp 57, might be related to his malignancy, physiatrist concerned about possible  myocarditis per history recent chemo and two rounds left, was on clozapine before. echo completed and EF 45-50%.   Cardiology called to consult. Patient denies cp, sob. Attending discussed case with hospitalist f with recommendations to continue Lisinopril and have patient follow up as outpatient no acute findings. No further inpatient cardiac work up at this time. Will sign off. Please reconsult if needed.

## 2022-11-26 NOTE — PROGRESS NOTE ADULT - ASSESSMENT
53 y/o male with PMHx of breast CA in remission, lymphoma, HTN, treatment-resistant schizoaffective disorder depressive type, sent in after a fall and recently with worsening disorganized behaviour.     Disorganized behavior/ schizoaffective disorder    pending psych eval    c/w psych meds    c/w 1:1    MRI pending    neuropathic pain in LE, recurrent falls    B12 low-normal:  subq b12 injections    PT evaluation .    imaging with acute minimally displaced right lateral seventh rib fracture. Healing distal left rib fractures.    tylenol prn for pain, incentive spirometry.      HTN (hypertension). continue lisinopril adjust meds per bp response.    abnormal echo: low normal EF    cardiology eval

## 2022-11-26 NOTE — PATIENT PROFILE ADULT - NSPROPOAPRESSUREINJURY_GEN_A_NUR
Medications: medications verified, no change  Added preferred pharmacy    Patient would like communication of their results via:      Cell Phone:   Telephone Information:   Mobile [phone]     Okay to leave a message containing results? Yes     Physician requesting Consult: Kvng Mcgee MD  PCP: Karie Thomas DO    CC: Adriana is a retired and presents today with 2 month history of Thryorid nodule     Have you or a family member seen Dr. Garg before?  NO  If yes, who and what for? NA    Past Medical History:   Diagnosis Date   • Contact dermatitis and other eczema, due to unspecified cause    • Diffuse cystic mastopathy     fibrocystic left breast   • Disorder of bone and cartilage, unspecified 03    osteopenia   • Other lichen, not elsewhere classified 2004    chest lesion bx     Past Surgical History:   Procedure Laterality Date   • Biopsy of thyroid,percut  03    Tyroid BX   •  delivery+postpartum care  ,     x2   • Colonoscopy diagnostic  06    wnl, 10 yr recall, Dr. Sim   • Dexa bone density axial skeleton      osteopenia   • Each add tooth extraction  late     [place] teeth   • Excise lesion back flank subq < 3 cm  10/07    right arm, neck, left arm   • [place] fallopian tube      Sterilization ligate Fallopian tubes   • Past surgical history      cyst removed on L breast   • Past surgical history      moles  removed- benign   • Remove tonsils/adenoids,<10 y/o  age  23    Tonsillectomy w Adenoids   • Shav skin les 0.6-1.0cm face,facial  06    rt nose- fibrous papule   • Surg rx missed abortn,2nd tri  1973    Missed  2nd trimester   • Total abdom hysterectomy  02    [facility] w [facility]: path report consistent with adenomyosis, myoma and a benign angiomyofibroblastoma.     Outpatient Encounter Medications as of 2021   Medication Sig Dispense Refill   • doxycycline hyclate (VIBRAMYCIN) 100 MG capsule Take 1 capsule by mouth 2  times daily. 20 capsule 0   • diphtheria-pertussis, acellular,-tetanus (BOOSTRIX) injection to be administered by pharmacist 0.5 mL 0   • clobetasol (TEMOVATE) 0.05 % cream Apply topically 2 times daily. 60 g 3   • CENTRUM SILVER PO TABS 1 TABLET DAILY 30 0   • metFORMIN (GLUCOPHAGE) 500 MG tablet Take 1 tablet by mouth daily (with breakfast). 30 tablet 0     No facility-administered encounter medications on file as of 8/11/2021.     ALLERGIES:  Cephalosporins and Penicillins    Social History:   Do you smoke? No. Did you ever smoke? Yes, When did you quit     Do you drink alcohol? Yes, How many drinks weekly 1-2 weekly     Family History:   Does anyone in your immediate family have:   Hearing Loss:  NO  Anesthesia Complications:   NO  Bleeding Disorders: NO    Have you ever had kidney dysfunction or dialysis?: No  Do you have a Latex allergy or sensitivity? No  Have you ever had (MRSA) or a serious skin infection?  NO    Systems Review:   In the PAST 2 weeks have you experienced any of the following?  Constitutional:  Fatigue:( low energy):  NO  Psychological:  Sleep Problems:  NO  Skin:  Rashes: NO  Eyes:  Vision changes: NO  Neurological:  Headaches:NO  Ears: Hearing Loss: NO  Ringing: NO  Dizziness: NO  Respiratory: Cough:  NO  Gastrointestinal: Heartburn: NO  Musculoskeletal: Joint Pain: NO  Hematologic: Easy Bruising: NO       no

## 2022-11-26 NOTE — CONSULT NOTE ADULT - PROBLEM SELECTOR RECOMMENDATION 9
Concern for possible myocarditis per history of recent chemo and on clozapine.  Echo completed with EF of 45-50%   -Patient with normal EKG  -Patient without clinical s/sx of myocarditis  --troponin negative  -continue lisinopril 2.5mg daily  -No further inpatient cardiac work up at this time. Will sign off. Please reconsult if needed.

## 2022-11-26 NOTE — CONSULT NOTE ADULT - NS ATTEND AMEND GEN_ALL_CORE FT
Patient seen and examined by me.  Patient is followed by Oncology at [facility], he was on Chemo.  No prior echo to compare.  Patients echo report noted.  Patient has psychiatric disorder.  Patient denies chest pain, dyspnea. No leg edema.  Patient is on Prinvil at home.  Patient should f/u with his Oncologist with the current echo report which should be compared to prior echo if had it pre chemo.  I have discussed my recommendation with the PA which are outlined above.  Discussed with Dr Jayro Bird.  Will sign off

## 2022-11-26 NOTE — PROGRESS NOTE ADULT - SUBJECTIVE AND OBJECTIVE BOX
seen for falls    no acute complaints  tolerating diet  complaining of tingling in feet    MEDICATIONS  (STANDING):  ascorbic acid 500 milliGRAM(s) Oral daily  cholecalciferol 2000 Unit(s) Oral daily  cyanocobalamin Injectable 1000 MICROGram(s) SubCutaneous daily  haloperidol     Tablet 5 milliGRAM(s) Oral daily  heparin   Injectable 5000 Unit(s) SubCutaneous every 12 hours  lisinopril 2.5 milliGRAM(s) Oral daily  LORazepam     Tablet 1 milliGRAM(s) Oral three times a day  pantoprazole    Tablet 40 milliGRAM(s) Oral before breakfast  polyethylene glycol 3350 17 Gram(s) Oral daily  senna 2 Tablet(s) Oral at bedtime    MEDICATIONS  (PRN):  acetaminophen     Tablet .. 650 milliGRAM(s) Oral every 6 hours PRN Mild Pain (1 - 3), Moderate Pain (4 - 6)  guaiFENesin Oral Liquid (Sugar-Free) 200 milliGRAM(s) Oral every 6 hours PRN Cough  haloperidol     Tablet 5 milliGRAM(s) Oral every 6 hours PRN agitation  LORazepam     Tablet 2 milliGRAM(s) Oral every 6 hours PRN Anxiety      Allergies    No Known Allergies        Vital Signs Last 24 Hrs  T(C): 36.8 (26 Nov 2022 11:06), Max: 36.9 (26 Nov 2022 05:00)  T(F): 98.2 (26 Nov 2022 11:06), Max: 98.5 (26 Nov 2022 05:00)  HR: 102 (26 Nov 2022 11:06) (85 - 102)  BP: 124/78 (26 Nov 2022 11:06) (118/84 - 147/90)  BP(mean): --  RR: 18 (26 Nov 2022 11:06) (18 - 20)  SpO2: 98% (26 Nov 2022 11:06) (97% - 100%)    Parameters below as of 26 Nov 2022 11:06  Patient On (Oxygen Delivery Method): room air        PHYSICAL EXAM:    GENERAL: NAD  CHEST/LUNG: Clear to ausculation bilaterally  HEART: Regular rate and rhythm; S1 S2;  ABDOMEN: Soft, Nontender, Bowel sounds present  EXTREMITIES: no edema   NERVOUS SYSTEM:  Alert & Oriented X3,  Motor Strength 5/5 B/L upper and lower extremities  PSYCH: normal mood, appropriate     LABS:                        11.4   6.57  )-----------( 332      ( 26 Nov 2022 03:49 )             32.2     11-26    133<L>  |  95<L>  |  14.2  ----------------------------<  94  4.1   |  26.0  |  0.66    Ca    9.7      26 Nov 2022 03:49    TPro  7.6  /  Alb  3.5  /  TBili  0.3<L>  /  DBili  x   /  AST  37  /  ALT  25  /  AlkPhos  104  11-25          CAPILLARY BLOOD GLUCOSE            RADIOLOGY & ADDITIONAL TESTS:

## 2022-11-26 NOTE — CONSULT NOTE ADULT - SUBJECTIVE AND OBJECTIVE BOX
[facility]                                              CARDIOLOGY AT [address], [place] [address]                                             Telephone: [phone]. Fax:[phone]                                                       CARDIOLOGY CONSULTATION NOTE                                                                                             History obtained by: Patient and medical record  Community Cardiologist:    obtained: Yes [  ] No [  ]  Reason for Consultation: abnormal Echo   Available out pt records reviewed: Yes [  ] No [  ]    Chief complaint:    Patient is a 52y old  Male who presents with a chief complaint of Disorganized behaviour / confusion / fall (26 Nov 2022 12:44)      HPI:  pt. is a 51 y/o male with PMHx of breast CA in remission, lymphoma, HTN, treatment-resistant schizoaffective disorder depressive type, is sent from Dr Sims at [facility] concerning about recently worsened disorganized behavior and a fall today, elevated CRP clozapine-induced myocarditis ? behavioral change, metastasis to brain ?    Pt saw Dr Pinto with [facility] [[phone]] previously. Pt was diagnosed with breast cancer and needed 12 rounds of chemo. However, Pt was hospitalized with 2 rounds of chemo left. Pt has 2 weeks of paclitaxel left. pt. is currently lying in bed quietly. answering questions , follows commands but does not know why he is in the hospital . reports no complaints, pt. stated that he fell but no LOC, no dizziness per history. no cp, no abd. pain, no n/v/d. pt. pt. is on 1 :1 at his facility and is is on 1: 1in the ER as well.  (25 Nov 2022 20:00)      CARDIAC TESTING   ECHO:   < from: TTE Echo Complete w/o Contrast w/ Doppler (11.26.22 @ 08:50) >  Summary:   1. Left ventricular ejection fraction, by visual estimation, is 45 to   50%. Mildly decreased global left ventricular systolic function. Spectral   Doppler shows impaired relaxation pattern of left ventricular myocardial   filling (Grade I diastolic dysfunction).   2. Normal right ventricular size and function.   3. Normal left atrial size.   4. Normal right atrial size.   5. There is no evidence of pericardial effusion.   6. Mild mitral annular calcification.   7. Mild mitral valve regurgitation.   8. Thickening of the anterior and posterior mitral valve leaflets.   9. Sclerotic aortic valve with normal opening.  10. Dilated Ao root at 4.1cm.    MD Lacy Electronically signed on 11/26/2022 at 12:28:56 PM    ELECTROPHYSIOLOGY:     PAST MEDICAL HISTORY  No pertinent past medical history    Lymphoma    Breast cancer    Major depression        PAST SURGICAL HISTORY  No significant past surgical history        SOCIAL HISTORY:  Denies smoking/alcohol/drugs  CIGARETTES:     ALCOHOL:  DRUGS:    FAMILY HISTORY:  Patient unable to provide medical history  pt. not give goood hsitory very likely due to his current disorganized behaviour.      Family History of Cardiovascular Disease:  Yes [  ] No [  ]  Coronary Artery Disease in first degree relative: Yes [  ] No [  ]  Sudden Cardiac Death in First degree relative: Yes [  ] No [  ]    HOME MEDICATIONS:  ascorbic acid 500 mg oral tablet: 1 tab(s) orally 2 times a day (20 Sep 2022 11:45)  Ativan 1 mg oral tablet: 1 tab(s) orally 3 times a day (25 Nov 2022 20:28)  Ativan 2 mg oral tablet: 1 tab(s) orally every 6 hours, As Needed (25 Nov 2022 20:28)  cholecalciferol oral tablet: 2000 unit(s) orally once a day (20 Sep 2022 11:45)  Desyrel 50 mg oral tablet: orally once a day (at bedtime), As Needed (25 Nov 2022 20:27)  Feosol 325 mg (65 mg elemental iron) oral tablet: 1 tab(s) orally once a day (25 Nov 2022 20:27)  Haldol 5 mg oral tablet: 1 tab(s) orally once a day at 9 am (25 Nov 2022 20:26)  Haldol 5 mg oral tablet: 1 tab(s) orally every 6 hours, As Needed (25 Nov 2022 20:26)  Motrin 400 mg oral tablet: 1 tab(s) orally every 4 hours, As Needed (25 Nov 2022 20:25)  Prinivil 2.5 mg oral tablet: 1 tab(s) orally once a day (25 Nov 2022 20:25)  Protonix 40 mg oral delayed release tablet: 1 tab(s) orally once a day (25 Nov 2022 20:25)  Robitussin 100 mg/5 mL oral liquid: 10 milliliter(s) orally every 6 hours, As Needed (25 Nov 2022 20:24)  Tylenol 325 mg oral tablet: 2 tab(s) orally every 4 hours, As Needed (25 Nov 2022 20:24)      CURRENT CARDIAC MEDICATIONS:  lisinopril 2.5 milliGRAM(s) Oral daily      CURRENT OTHER MEDICATIONS:  guaiFENesin Oral Liquid (Sugar-Free) 200 milliGRAM(s) Oral every 6 hours PRN Cough  acetaminophen     Tablet .. 650 milliGRAM(s) Oral every 6 hours PRN Mild Pain (1 - 3), Moderate Pain (4 - 6)  haloperidol     Tablet 5 milliGRAM(s) Oral daily  haloperidol     Tablet 5 milliGRAM(s) Oral every 6 hours PRN agitation  LORazepam     Tablet 1 milliGRAM(s) Oral three times a day  LORazepam     Tablet 2 milliGRAM(s) Oral every 6 hours PRN Anxiety  pantoprazole    Tablet 40 milliGRAM(s) Oral before breakfast  polyethylene glycol 3350 17 Gram(s) Oral daily  senna 2 Tablet(s) Oral at bedtime  ascorbic acid 500 milliGRAM(s) Oral daily  cholecalciferol 2000 Unit(s) Oral daily  cyanocobalamin Injectable 1000 MICROGram(s) SubCutaneous daily, Stop order after: 5 Days  ferrous    sulfate 325 milliGRAM(s) Oral daily  heparin   Injectable 5000 Unit(s) SubCutaneous every 12 hours      ALLERGIES:   No Known Allergies      REVIEW OF SYMPTOMS:   CONSTITUTIONAL: No fever, no chills, no weight loss, no weight gain, no fatigue   ENMT:  No vertigo; No sinus or throat pain  NECK: No pain or stiffness  CARDIOVASCULAR: No chest pain, no dyspnea, no syncope/presyncope, no palpitations, no dizziness, no Orthopnea, no Paroxsymal nocturnal dyspnea  RESPIRATORY: no Shortness of breath, no cough, no wheezing  : No dysuria, no hematuria   GI: No dark color stool, no nausea, no diarrhea, no constipation, no abdominal pain   NEURO: No headache, no slurred speech   MUSCULOSKELETAL: No joint pain or swelling; No muscle, back, or extremity pain  PSYCH: No agitation, no anxiety.    ALL OTHER REVIEW OF SYSTEMS ARE NEGATIVE.    VITAL SIGNS:  T(C): 36.8 (11-26-22 @ 11:06), Max: 36.9 (11-26-22 @ 05:00)  T(F): 98.2 (11-26-22 @ 11:06), Max: 98.5 (11-26-22 @ 05:00)  HR: 102 (11-26-22 @ 11:06) (85 - 102)  BP: 124/78 (11-26-22 @ 11:06) (118/84 - 147/90)  RR: 18 (11-26-22 @ 11:06) (18 - 20)  SpO2: 98% (11-26-22 @ 11:06) (97% - 100%)    INTAKE AND OUTPUT:       PHYSICAL EXAM:  Constitutional: Comfortable . No acute distress.   HEENT: Atraumatic and normocephalic , neck is supple . no JVD. No carotid bruit.  CNS: A&Ox3. No focal deficits.   Respiratory: CTAB, unlabored   Cardiovascular: RRR normal s1 s2. No murmur. No rubs or gallop.  Gastrointestinal: Soft, non-tender. +Bowel sounds.   Extremities: 2+ Peripheral Pulses, No clubbing, cyanosis, or edema  Psychiatric: Calm . no agitation.   Skin: Warm and dry, no ulcers on extremities     LABS:  ( 25 Nov 2022 17:55 )  Troponin T  <0.01,  CPK  X    , CKMB  X    , BNP X                                  11.4   6.57  )-----------( 332      ( 26 Nov 2022 03:49 )             32.2     11-26    133<L>  |  95<L>  |  14.2  ----------------------------<  94  4.1   |  26.0  |  0.66    Ca    9.7      26 Nov 2022 03:49    TPro  7.6  /  Alb  3.5  /  TBili  0.3<L>  /  DBili  x   /  AST  37  /  ALT  25  /  AlkPhos  104  11-25            Thyroid Stimulating Hormone, Serum: 1.92 uIU/mL (11-26-22 @ 03:49)      INTERPRETATION OF TELEMETRY:     ECG:   Prior ECG: Yes x[  ] No [  ]  < from: 12 Lead ECG (09.14.22 @ 06:15) >  Ventricular Rate 105 BPM    Atrial Rate 105 BPM    P-R Interval 168 ms    QRS Duration 88 ms    Q-T Interval 322 ms    QTC Calculation(Bazett) 425 ms    P Axis 75 degrees    R Axis 50 degrees    T Axis 54 degrees    Diagnosis Line Sinus tachycardia  Otherwise normal ECG    Confirmed by MATEO JACKSON ([id number]) on 9/14/2022 10:15:07 AM    < end of copied text >    RADIOLOGY & ADDITIONAL STUDIES:    X-ray:      CT scan:   < from: CT Chest w/ IV Cont (11.25.22 @ 18:55) >  IMPRESSION:  1. Acute minimally displaced right lateral seventh rib fracture. Healing   distal left rib fractures.  2. Mild bilateral peribronchovascular groundglass opacification   throughout the lungs.      MRI:   US:                                                [facility]                                              CARDIOLOGY AT [address], [place] [address]                                             Telephone: [phone]. Fax:[phone]                                                       CARDIOLOGY CONSULTATION NOTE                                                                                             History obtained by: Patient and medical record  Community Cardiologist: none   obtained: Yes [  ] No [  x]  Reason for Consultation: abnormal Echo   Available out pt records reviewed: Yes [  ] No [ x ]    Chief complaint:    Patient is a 52y old  Male who presents with a chief complaint of Disorganized behaviour / confusion / fall (26 Nov 2022 12:44)      HPI:  pt. is a 51 y/o male with PMHx of breast CA in remission, lymphoma, HTN, treatment-resistant schizoaffective disorder depressive type, is sent from Dr Sims at [facility] concerning about recently worsened disorganized behavior and a fall today, elevated CRP clozapine-induced myocarditis ? behavioral change, metastasis to brain ?    Pt saw Dr Pinto with [facility] [[phone]] previously. Pt was diagnosed with breast cancer and needed 12 rounds of chemo. However, Pt was hospitalized with 2 rounds of chemo left. Pt has 2 weeks of paclitaxel left. pt. is currently lying in bed quietly. answering questions , follows commands but does not know why he is in the hospital . reports no complaints, pt. stated that he fell but no LOC, no dizziness per history. no cp, no abd. pain, no n/v/d. pt. pt. is on 1 :1 at his facility and is is on 1: 1in the ER as well.  (25 Nov 2022 20:00)      CARDIAC TESTING   ECHO:   < from: TTE Echo Complete w/o Contrast w/ Doppler (11.26.22 @ 08:50) >  Summary:   1. Left ventricular ejection fraction, by visual estimation, is 45 to   50%. Mildly decreased global left ventricular systolic function. Spectral   Doppler shows impaired relaxation pattern of left ventricular myocardial   filling (Grade I diastolic dysfunction).   2. Normal right ventricular size and function.   3. Normal left atrial size.   4. Normal right atrial size.   5. There is no evidence of pericardial effusion.   6. Mild mitral annular calcification.   7. Mild mitral valve regurgitation.   8. Thickening of the anterior and posterior mitral valve leaflets.   9. Sclerotic aortic valve with normal opening.  10. Dilated Ao root at 4.1cm.    MD Lacy Electronically signed on 11/26/2022 at 12:28:56 PM    ELECTROPHYSIOLOGY:     PAST MEDICAL HISTORY  No pertinent past medical history    Lymphoma    Breast cancer    Major depression        PAST SURGICAL HISTORY  No significant past surgical history        SOCIAL HISTORY:  Denies smoking/alcohol/drugs  CIGARETTES:     ALCOHOL:  DRUGS:    FAMILY HISTORY:  Patient unable to provide medical history  pt. not give goood hsitory very likely due to his current disorganized behaviour.      Family History of Cardiovascular Disease:  Yes [  ] No [  ]  Coronary Artery Disease in first degree relative: Yes [  ] No [  ]  Sudden Cardiac Death in First degree relative: Yes [  ] No [  ]    HOME MEDICATIONS:  ascorbic acid 500 mg oral tablet: 1 tab(s) orally 2 times a day (20 Sep 2022 11:45)  Ativan 1 mg oral tablet: 1 tab(s) orally 3 times a day (25 Nov 2022 20:28)  Ativan 2 mg oral tablet: 1 tab(s) orally every 6 hours, As Needed (25 Nov 2022 20:28)  cholecalciferol oral tablet: 2000 unit(s) orally once a day (20 Sep 2022 11:45)  Desyrel 50 mg oral tablet: orally once a day (at bedtime), As Needed (25 Nov 2022 20:27)  Feosol 325 mg (65 mg elemental iron) oral tablet: 1 tab(s) orally once a day (25 Nov 2022 20:27)  Haldol 5 mg oral tablet: 1 tab(s) orally once a day at 9 am (25 Nov 2022 20:26)  Haldol 5 mg oral tablet: 1 tab(s) orally every 6 hours, As Needed (25 Nov 2022 20:26)  Motrin 400 mg oral tablet: 1 tab(s) orally every 4 hours, As Needed (25 Nov 2022 20:25)  Prinivil 2.5 mg oral tablet: 1 tab(s) orally once a day (25 Nov 2022 20:25)  Protonix 40 mg oral delayed release tablet: 1 tab(s) orally once a day (25 Nov 2022 20:25)  Robitussin 100 mg/5 mL oral liquid: 10 milliliter(s) orally every 6 hours, As Needed (25 Nov 2022 20:24)  Tylenol 325 mg oral tablet: 2 tab(s) orally every 4 hours, As Needed (25 Nov 2022 20:24)      CURRENT CARDIAC MEDICATIONS:  lisinopril 2.5 milliGRAM(s) Oral daily      CURRENT OTHER MEDICATIONS:  guaiFENesin Oral Liquid (Sugar-Free) 200 milliGRAM(s) Oral every 6 hours PRN Cough  acetaminophen     Tablet .. 650 milliGRAM(s) Oral every 6 hours PRN Mild Pain (1 - 3), Moderate Pain (4 - 6)  haloperidol     Tablet 5 milliGRAM(s) Oral daily  haloperidol     Tablet 5 milliGRAM(s) Oral every 6 hours PRN agitation  LORazepam     Tablet 1 milliGRAM(s) Oral three times a day  LORazepam     Tablet 2 milliGRAM(s) Oral every 6 hours PRN Anxiety  pantoprazole    Tablet 40 milliGRAM(s) Oral before breakfast  polyethylene glycol 3350 17 Gram(s) Oral daily  senna 2 Tablet(s) Oral at bedtime  ascorbic acid 500 milliGRAM(s) Oral daily  cholecalciferol 2000 Unit(s) Oral daily  cyanocobalamin Injectable 1000 MICROGram(s) SubCutaneous daily, Stop order after: 5 Days  ferrous    sulfate 325 milliGRAM(s) Oral daily  heparin   Injectable 5000 Unit(s) SubCutaneous every 12 hours      ALLERGIES:   No Known Allergies      REVIEW OF SYMPTOMS:   CONSTITUTIONAL: No fever, no chills, no weight loss, no weight gain, no fatigue   ENMT:  No vertigo; No sinus or throat pain  NECK: No pain or stiffness  CARDIOVASCULAR: No chest pain, no dyspnea, no syncope/presyncope, no palpitations, no dizziness, no Orthopnea, no Paroxsymal nocturnal dyspnea  RESPIRATORY: no Shortness of breath, no cough, no wheezing  : No dysuria, no hematuria   GI: No dark color stool, no nausea, no diarrhea, no constipation, no abdominal pain   NEURO: No headache, no slurred speech   MUSCULOSKELETAL: No joint pain or swelling; No muscle, back, or extremity pain  PSYCH: No agitation, no anxiety.    ALL OTHER REVIEW OF SYSTEMS ARE NEGATIVE.    VITAL SIGNS:  T(C): 36.8 (11-26-22 @ 11:06), Max: 36.9 (11-26-22 @ 05:00)  T(F): 98.2 (11-26-22 @ 11:06), Max: 98.5 (11-26-22 @ 05:00)  HR: 102 (11-26-22 @ 11:06) (85 - 102)  BP: 124/78 (11-26-22 @ 11:06) (118/84 - 147/90)  RR: 18 (11-26-22 @ 11:06) (18 - 20)  SpO2: 98% (11-26-22 @ 11:06) (97% - 100%)    INTAKE AND OUTPUT:       PHYSICAL EXAM:  Constitutional: Comfortable . No acute distress.   HEENT: Atraumatic and normocephalic , neck is supple . no JVD. No carotid bruit.  CNS: A&Ox3. No focal deficits.   Respiratory:  bilateral course BS, unlabored   Cardiovascular: RRR normal s1 s2. No murmur. No rubs or gallop.  Gastrointestinal: Soft, non-tender. +Bowel sounds.   Extremities: 2+ Peripheral Pulses, No clubbing, cyanosis, or edema  Psychiatric: Calm . no agitation.   Skin: Warm and dry, no ulcers on extremities     LABS:  ( 25 Nov 2022 17:55 )  Troponin T  <0.01,  CPK  X    , CKMB  X    , BNP X                                  11.4   6.57  )-----------( 332      ( 26 Nov 2022 03:49 )             32.2     11-26    133<L>  |  95<L>  |  14.2  ----------------------------<  94  4.1   |  26.0  |  0.66    Ca    9.7      26 Nov 2022 03:49    TPro  7.6  /  Alb  3.5  /  TBili  0.3<L>  /  DBili  x   /  AST  37  /  ALT  25  /  AlkPhos  104  11-25            Thyroid Stimulating Hormone, Serum: 1.92 uIU/mL (11-26-22 @ 03:49)      INTERPRETATION OF TELEMETRY:     ECG:   Prior ECG: Yes x[  ] No [  ]  < from: 12 Lead ECG (09.14.22 @ 06:15) >  Ventricular Rate 105 BPM    Atrial Rate 105 BPM    P-R Interval 168 ms    QRS Duration 88 ms    Q-T Interval 322 ms    QTC Calculation(Bazett) 425 ms    P Axis 75 degrees    R Axis 50 degrees    T Axis 54 degrees    Diagnosis Line Sinus tachycardia  Otherwise normal ECG    Confirmed by MATEO JACKSON ([id number]) on 9/14/2022 10:15:07 AM    < end of copied text >    RADIOLOGY & ADDITIONAL STUDIES:    X-ray:      CT scan:   < from: CT Chest w/ IV Cont (11.25.22 @ 18:55) >  IMPRESSION:  1. Acute minimally displaced right lateral seventh rib fracture. Healing   distal left rib fractures.  2. Mild bilateral peribronchovascular groundglass opacification   throughout the lungs.

## 2022-11-26 NOTE — PATIENT PROFILE ADULT - FALL HARM RISK - UNIVERSAL INTERVENTIONS
Bed in lowest position, wheels locked, appropriate side rails in place/Call bell, personal items and telephone in reach/Instruct patient to call for assistance before getting out of bed or chair/Non-slip footwear when patient is out of bed/[place] to call system/Physically safe environment - no spills, clutter or unnecessary equipment/Purposeful Proactive Rounding/Room/bathroom lighting operational, light cord in reach

## 2022-11-27 LAB
AMMONIA BLD-MCNC: 12 UMOL/L — SIGNIFICANT CHANGE UP (ref 11–55)
ANION GAP SERPL CALC-SCNC: 13 MMOL/L — SIGNIFICANT CHANGE UP (ref 5–17)
BUN SERPL-MCNC: 17.8 MG/DL — SIGNIFICANT CHANGE UP (ref 8–20)
CALCIUM SERPL-MCNC: 9.7 MG/DL — SIGNIFICANT CHANGE UP (ref 8.4–10.5)
CHLORIDE SERPL-SCNC: 94 MMOL/L — LOW (ref 96–108)
CO2 SERPL-SCNC: 26 MMOL/L — SIGNIFICANT CHANGE UP (ref 22–29)
CREAT SERPL-MCNC: 0.89 MG/DL — SIGNIFICANT CHANGE UP (ref 0.5–1.3)
EGFR: 102 ML/MIN/1.73M2 — SIGNIFICANT CHANGE UP
GLUCOSE SERPL-MCNC: 98 MG/DL — SIGNIFICANT CHANGE UP (ref 70–99)
MAGNESIUM SERPL-MCNC: 2 MG/DL — SIGNIFICANT CHANGE UP (ref 1.6–2.6)
OB PNL STL: NEGATIVE — SIGNIFICANT CHANGE UP
PHOSPHATE SERPL-MCNC: 4.2 MG/DL — SIGNIFICANT CHANGE UP (ref 2.4–4.7)
POTASSIUM SERPL-MCNC: 4.8 MMOL/L — SIGNIFICANT CHANGE UP (ref 3.5–5.3)
POTASSIUM SERPL-SCNC: 4.8 MMOL/L — SIGNIFICANT CHANGE UP (ref 3.5–5.3)
SODIUM SERPL-SCNC: 133 MMOL/L — LOW (ref 135–145)
TSH SERPL-MCNC: 2.42 UIU/ML — SIGNIFICANT CHANGE UP (ref 0.27–4.2)
URATE SERPL-MCNC: 5.5 MG/DL — SIGNIFICANT CHANGE UP (ref 3.4–7)

## 2022-11-27 PROCEDURE — 99233 SBSQ HOSP IP/OBS HIGH 50: CPT

## 2022-11-27 RX ORDER — SODIUM CHLORIDE 9 MG/ML
1000 INJECTION INTRAMUSCULAR; INTRAVENOUS; SUBCUTANEOUS
Refills: 0 | Status: DISCONTINUED | OUTPATIENT
Start: 2022-11-27 | End: 2022-11-28

## 2022-11-27 RX ADMIN — PREGABALIN 1000 MICROGRAM(S): 225 CAPSULE ORAL at 17:16

## 2022-11-27 RX ADMIN — Medication 2000 UNIT(S): at 14:18

## 2022-11-27 RX ADMIN — Medication 1 MILLIGRAM(S): at 05:45

## 2022-11-27 RX ADMIN — Medication 500 MILLIGRAM(S): at 14:17

## 2022-11-27 RX ADMIN — HEPARIN SODIUM 5000 UNIT(S): 5000 INJECTION INTRAVENOUS; SUBCUTANEOUS at 17:17

## 2022-11-27 RX ADMIN — PANTOPRAZOLE SODIUM 40 MILLIGRAM(S): 20 TABLET, DELAYED RELEASE ORAL at 14:17

## 2022-11-27 RX ADMIN — HALOPERIDOL DECANOATE 5 MILLIGRAM(S): 100 INJECTION INTRAMUSCULAR at 14:17

## 2022-11-27 RX ADMIN — Medication 325 MILLIGRAM(S): at 14:17

## 2022-11-27 RX ADMIN — HEPARIN SODIUM 5000 UNIT(S): 5000 INJECTION INTRAVENOUS; SUBCUTANEOUS at 05:48

## 2022-11-27 RX ADMIN — Medication 1 MILLIGRAM(S): at 21:41

## 2022-11-27 RX ADMIN — Medication 1 MILLIGRAM(S): at 14:18

## 2022-11-27 RX ADMIN — LISINOPRIL 2.5 MILLIGRAM(S): 2.5 TABLET ORAL at 05:46

## 2022-11-27 RX ADMIN — SENNA PLUS 2 TABLET(S): 8.6 TABLET ORAL at 21:41

## 2022-11-27 NOTE — PROGRESS NOTE ADULT - SUBJECTIVE AND OBJECTIVE BOX
CHIEF COMPLAINT/INTERVAL HISTORY:    Patient is a 52y old  Male who presents with a chief complaint of Disorganized behaviour / confusion / fall (26 Nov 2022 13:28)    SUBJECTIVE & OBJECTIVE: Pt seen and examined at bedside. No overnight events. Flat affect. AAO x 1; not engaging in conversation. MRI brain negative.    ROS: Denies pain.    ICU Vital Signs Last 24 Hrs  T(C): 37.1 (27 Nov 2022 05:14), Max: 37.1 (27 Nov 2022 05:14)  T(F): 98.8 (27 Nov 2022 05:14), Max: 98.8 (27 Nov 2022 05:14)  HR: 98 (27 Nov 2022 05:14) (96 - 115)  BP: 127/63 (27 Nov 2022 05:14) (124/79 - 128/76)  RR: 18 (27 Nov 2022 05:14) (17 - 18)  SpO2: 99% (27 Nov 2022 05:14) (96% - 99%)    O2 Parameters below as of 27 Nov 2022 05:14  Patient On (Oxygen Delivery Method): room air      MEDICATIONS  (STANDING):  ascorbic acid 500 milliGRAM(s) Oral daily  cholecalciferol 2000 Unit(s) Oral daily  cyanocobalamin Injectable 1000 MICROGram(s) SubCutaneous daily  ferrous    sulfate 325 milliGRAM(s) Oral daily  haloperidol     Tablet 5 milliGRAM(s) Oral daily  heparin   Injectable 5000 Unit(s) SubCutaneous every 12 hours  lisinopril 2.5 milliGRAM(s) Oral daily  LORazepam     Tablet 1 milliGRAM(s) Oral three times a day  pantoprazole    Tablet 40 milliGRAM(s) Oral before breakfast  polyethylene glycol 3350 17 Gram(s) Oral daily  senna 2 Tablet(s) Oral at bedtime    MEDICATIONS  (PRN):  acetaminophen     Tablet .. 650 milliGRAM(s) Oral every 6 hours PRN Mild Pain (1 - 3), Moderate Pain (4 - 6)  guaiFENesin Oral Liquid (Sugar-Free) 200 milliGRAM(s) Oral every 6 hours PRN Cough  haloperidol     Tablet 5 milliGRAM(s) Oral every 6 hours PRN agitation  LORazepam     Tablet 2 milliGRAM(s) Oral every 6 hours PRN Anxiety      LABS:                        11.4   6.57  )-----------( 332      ( 26 Nov 2022 03:49 )             32.2     11-26    133<L>  |  95<L>  |  14.2  ----------------------------<  94  4.1   |  26.0  |  0.66    Ca    9.7      26 Nov 2022 03:49    TPro  7.6  /  Alb  3.5  /  TBili  0.3<L>  /  DBili  x   /  AST  37  /  ALT  25  /  AlkPhos  104  11-25    PHYSICAL EXAM:    GENERAL: middle aged male, sitting in bed, NAD, flat affect  HEAD:  Atraumatic, Normocephalic  EYES: EOMI, PERRLA, conjunctiva and sclera clear  ENMT: Moist mucous membranes  NECK: Supple   NERVOUS SYSTEM:  Alert & Oriented X1  CHEST/LUNG: Clear to auscultation bilaterally   HEART: Regular rate and rhythm; + S1/S2  ABDOMEN: Soft, Nontender, Nondistended; Bowel sounds present  EXTREMITIES:  no pedal edema

## 2022-11-27 NOTE — PROGRESS NOTE ADULT - ASSESSMENT
Patient is a 51 y/o male with PMHx of breast CA in remission, lymphoma, HTN, treatment-resistant schizoaffective disorder depressive type, sent in after a fall and recently with worsening disorganized behaviour.      Problem/Plan - 1:  ·  Problem: Disorganized behavior; history of Schizoaffective disorder  ·  Plan: unclear etiology; calm with flat affect while hospitalized  -on constant observation at [facility] and continues to be on constant observation here  -continue current psych meds  -seen by Dr. Henry in the ED on Friday and will be reassessed to return to [facility] on 11/28 as discussed with psych NP     Problem/Plan - 2:  ·  Problem: Confusion vs worsening underlying psych disease  ·  Plan: CTH and MRI negative.  B12 low/normal - continue injections as ordered  Check TSH and ammonia level  Check UA     Problem/Plan - 3:  ·  Problem: Frequent Falls   ·  Plan:  CT head/ neck no acute findings. MRI brain also negative.  Acute minimally displaced right lateral seventh rib fracture. Healing distal left rib fractures.  tylenol prn for pain, incentive spirometry  fall precautions  PT evaluation pending     Problem/Plan - 4:  ·  Problem: History of breast cancer.   ·  Plan: MRI brain  negative for mets  -outpatient hem/onc follow up and surveillance     Problem/Plan - 5:  ·  Problem: HTN (hypertension).   ·  Plan: BP stable; continue lisinopril     Problem/Plan - 6:  ·  Problem: HFrEF; unclear if acute or chronic  -TTE with EF of 40-45% for which cardio was consulted  -elevated inflammatory markers and [facility] was concerned for possible clozapine inducted myocarditis  -seen by cardiology on 11/26 and recommended outpatient follow up with primary cardiology  -no evidence of decompensated heart failure  -TNI negative and non ischemic EKG changes  -continue lisinopril    DVT ppx - Heparin SC    Dispo - PT evaluation and Psych re-evaluation on 11/28 in efforts to safely discharge patient back to [facility].    Plan discussed with patient, MORA, RN, Esequiel Psych NP     Patient is a 53 y/o male with PMHx of breast CA in remission, lymphoma, HTN, treatment-resistant schizoaffective disorder depressive type, sent in after a fall and recently with worsening disorganized behaviour.      Problem/Plan - 1:  ·  Problem: Disorganized behavior; history of Schizoaffective disorder  ·  Plan: unclear etiology; calm with flat affect while hospitalized  -on constant observation at [facility] and continues to be on constant observation here  -continue current psych meds  -seen by Dr. Henry in the ED on Friday and will be reassessed to return to [facility] on 11/28 as discussed with psych NP     Problem/Plan - 2:  ·  Problem: Confusion vs worsening underlying psych disease  ·  Plan: CTH and MRI negative.  B12 low/normal - continue injections as ordered  Check TSH and ammonia level  Check UA     Problem/Plan - 3:  ·  Problem: Frequent Falls   ·  Plan:  CT head/ neck no acute findings. MRI brain also negative.  Acute minimally displaced right lateral seventh rib fracture. Healing distal left rib fractures.  tylenol prn for pain, incentive spirometry  fall precautions  PT evaluation pending     Problem/Plan - 4:  ·  Problem: History of breast cancer.   ·  Plan: MRI brain  negative for mets  -outpatient hem/onc follow up and surveillance     Problem/Plan - 5:  ·  Problem: HTN (hypertension).   ·  Plan: BP stable; continue lisinopril     Problem/Plan - 6:  ·  Problem: HFrEF; unclear if acute or chronic  -TTE with EF of 40-45% for which cardio was consulted  -elevated inflammatory markers and [facility] was concerned for possible clozapine inducted myocarditis  -seen by cardiology on 11/26 and recommended outpatient follow up with primary cardiology  -no evidence of decompensated heart failure  -TNI negative and non ischemic EKG changes     Problem/Plan - 7:  ·  Problem: Mild Hyponatremia possibly due to polydipsia  -appears euvolemic  -check urine studies  -repeat BMP  -monitor I/os  -free water restriction    DVT ppx - Heparin SC    Dispo - PT evaluation and Psych re-evaluation on 11/28 in efforts to safely discharge patient back to [facility].    Plan discussed with patient, MORA, RN, Esequiel Psych NP

## 2022-11-28 LAB
ANION GAP SERPL CALC-SCNC: 13 MMOL/L — SIGNIFICANT CHANGE UP (ref 5–17)
APPEARANCE UR: CLEAR — SIGNIFICANT CHANGE UP
BACTERIA # UR AUTO: ABNORMAL
BASOPHILS # BLD AUTO: 0.04 K/UL — SIGNIFICANT CHANGE UP (ref 0–0.2)
BASOPHILS NFR BLD AUTO: 0.7 % — SIGNIFICANT CHANGE UP (ref 0–2)
BILIRUB UR-MCNC: NEGATIVE — SIGNIFICANT CHANGE UP
BUN SERPL-MCNC: 17.4 MG/DL — SIGNIFICANT CHANGE UP (ref 8–20)
CALCIUM SERPL-MCNC: 9.3 MG/DL — SIGNIFICANT CHANGE UP (ref 8.4–10.5)
CHLORIDE SERPL-SCNC: 95 MMOL/L — LOW (ref 96–108)
CO2 SERPL-SCNC: 24 MMOL/L — SIGNIFICANT CHANGE UP (ref 22–29)
COLOR SPEC: YELLOW — SIGNIFICANT CHANGE UP
CREAT SERPL-MCNC: 0.96 MG/DL — SIGNIFICANT CHANGE UP (ref 0.5–1.3)
DIFF PNL FLD: NEGATIVE — SIGNIFICANT CHANGE UP
EGFR: 95 ML/MIN/1.73M2 — SIGNIFICANT CHANGE UP
EOSINOPHIL # BLD AUTO: 0.04 K/UL — SIGNIFICANT CHANGE UP (ref 0–0.5)
EOSINOPHIL NFR BLD AUTO: 0.7 % — SIGNIFICANT CHANGE UP (ref 0–6)
EPI CELLS # UR: SIGNIFICANT CHANGE UP
GLUCOSE SERPL-MCNC: 98 MG/DL — SIGNIFICANT CHANGE UP (ref 70–99)
GLUCOSE UR QL: NEGATIVE MG/DL — SIGNIFICANT CHANGE UP
HCT VFR BLD CALC: 32.1 % — LOW (ref 39–50)
HGB BLD-MCNC: 11.6 G/DL — LOW (ref 13–17)
IMM GRANULOCYTES NFR BLD AUTO: 1.3 % — HIGH (ref 0–0.9)
KETONES UR-MCNC: ABNORMAL
LEUKOCYTE ESTERASE UR-ACNC: NEGATIVE — SIGNIFICANT CHANGE UP
LYMPHOCYTES # BLD AUTO: 1.7 K/UL — SIGNIFICANT CHANGE UP (ref 1–3.3)
LYMPHOCYTES # BLD AUTO: 28.1 % — SIGNIFICANT CHANGE UP (ref 13–44)
MAGNESIUM SERPL-MCNC: 2 MG/DL — SIGNIFICANT CHANGE UP (ref 1.6–2.6)
MCHC RBC-ENTMCNC: 27.4 PG — SIGNIFICANT CHANGE UP (ref 27–34)
MCHC RBC-ENTMCNC: 36.1 GM/DL — HIGH (ref 32–36)
MCV RBC AUTO: 75.7 FL — LOW (ref 80–100)
MONOCYTES # BLD AUTO: 0.86 K/UL — SIGNIFICANT CHANGE UP (ref 0–0.9)
MONOCYTES NFR BLD AUTO: 14.2 % — HIGH (ref 2–14)
NEUTROPHILS # BLD AUTO: 3.33 K/UL — SIGNIFICANT CHANGE UP (ref 1.8–7.4)
NEUTROPHILS NFR BLD AUTO: 55 % — SIGNIFICANT CHANGE UP (ref 43–77)
NITRITE UR-MCNC: NEGATIVE — SIGNIFICANT CHANGE UP
OSMOLALITY UR: 689 MOSM/KG — SIGNIFICANT CHANGE UP (ref 300–1000)
PH UR: 7 — SIGNIFICANT CHANGE UP (ref 5–8)
PHOSPHATE SERPL-MCNC: 3.5 MG/DL — SIGNIFICANT CHANGE UP (ref 2.4–4.7)
PLATELET # BLD AUTO: 360 K/UL — SIGNIFICANT CHANGE UP (ref 150–400)
POTASSIUM SERPL-MCNC: 4.4 MMOL/L — SIGNIFICANT CHANGE UP (ref 3.5–5.3)
POTASSIUM SERPL-SCNC: 4.4 MMOL/L — SIGNIFICANT CHANGE UP (ref 3.5–5.3)
PROT UR-MCNC: 15
RBC # BLD: 4.24 M/UL — SIGNIFICANT CHANGE UP (ref 4.2–5.8)
RBC # FLD: 15.9 % — HIGH (ref 10.3–14.5)
RBC CASTS # UR COMP ASSIST: NEGATIVE /HPF — SIGNIFICANT CHANGE UP (ref 0–4)
SARS-COV-2 RNA SPEC QL NAA+PROBE: SIGNIFICANT CHANGE UP
SODIUM SERPL-SCNC: 132 MMOL/L — LOW (ref 135–145)
SODIUM UR-SCNC: 113 MMOL/L — SIGNIFICANT CHANGE UP
SP GR SPEC: 1.01 — SIGNIFICANT CHANGE UP (ref 1.01–1.02)
UROBILINOGEN FLD QL: 1 MG/DL
WBC # BLD: 6.05 K/UL — SIGNIFICANT CHANGE UP (ref 3.8–10.5)
WBC # FLD AUTO: 6.05 K/UL — SIGNIFICANT CHANGE UP (ref 3.8–10.5)
WBC UR QL: SIGNIFICANT CHANGE UP /HPF (ref 0–5)

## 2022-11-28 PROCEDURE — 99233 SBSQ HOSP IP/OBS HIGH 50: CPT

## 2022-11-28 PROCEDURE — 99223 1ST HOSP IP/OBS HIGH 75: CPT

## 2022-11-28 RX ORDER — HALOPERIDOL DECANOATE 100 MG/ML
5 INJECTION INTRAMUSCULAR
Refills: 0 | Status: DISCONTINUED | OUTPATIENT
Start: 2022-11-28 | End: 2022-12-02

## 2022-11-28 RX ORDER — HALOPERIDOL DECANOATE 100 MG/ML
10 INJECTION INTRAMUSCULAR
Refills: 0 | Status: DISCONTINUED | OUTPATIENT
Start: 2022-11-28 | End: 2022-12-08

## 2022-11-28 RX ORDER — HALOPERIDOL DECANOATE 100 MG/ML
5 INJECTION INTRAMUSCULAR EVERY 12 HOURS
Refills: 0 | Status: DISCONTINUED | OUTPATIENT
Start: 2022-11-28 | End: 2022-11-28

## 2022-11-28 RX ORDER — BENZTROPINE MESYLATE 1 MG
0.5 TABLET ORAL EVERY 12 HOURS
Refills: 0 | Status: DISCONTINUED | OUTPATIENT
Start: 2022-11-28 | End: 2022-12-08

## 2022-11-28 RX ORDER — HALOPERIDOL DECANOATE 100 MG/ML
5 INJECTION INTRAMUSCULAR ONCE
Refills: 0 | Status: COMPLETED | OUTPATIENT
Start: 2022-11-28 | End: 2022-11-28

## 2022-11-28 RX ORDER — SODIUM CHLORIDE 9 MG/ML
1 INJECTION INTRAMUSCULAR; INTRAVENOUS; SUBCUTANEOUS THREE TIMES A DAY
Refills: 0 | Status: DISCONTINUED | OUTPATIENT
Start: 2022-11-28 | End: 2022-12-08

## 2022-11-28 RX ADMIN — Medication 1 MILLIGRAM(S): at 13:40

## 2022-11-28 RX ADMIN — HEPARIN SODIUM 5000 UNIT(S): 5000 INJECTION INTRAVENOUS; SUBCUTANEOUS at 18:57

## 2022-11-28 RX ADMIN — Medication 2 MILLIGRAM(S): at 21:45

## 2022-11-28 RX ADMIN — Medication 325 MILLIGRAM(S): at 13:40

## 2022-11-28 RX ADMIN — HALOPERIDOL DECANOATE 5 MILLIGRAM(S): 100 INJECTION INTRAMUSCULAR at 18:57

## 2022-11-28 RX ADMIN — HALOPERIDOL DECANOATE 5 MILLIGRAM(S): 100 INJECTION INTRAMUSCULAR at 21:20

## 2022-11-28 RX ADMIN — PREGABALIN 1000 MICROGRAM(S): 225 CAPSULE ORAL at 13:40

## 2022-11-28 RX ADMIN — Medication 500 MILLIGRAM(S): at 13:39

## 2022-11-28 RX ADMIN — Medication 2 MILLIGRAM(S): at 21:00

## 2022-11-28 RX ADMIN — POLYETHYLENE GLYCOL 3350 17 GRAM(S): 17 POWDER, FOR SOLUTION ORAL at 13:41

## 2022-11-28 RX ADMIN — Medication 0.5 MILLIGRAM(S): at 18:57

## 2022-11-28 RX ADMIN — PANTOPRAZOLE SODIUM 40 MILLIGRAM(S): 20 TABLET, DELAYED RELEASE ORAL at 06:33

## 2022-11-28 RX ADMIN — HEPARIN SODIUM 5000 UNIT(S): 5000 INJECTION INTRAVENOUS; SUBCUTANEOUS at 05:33

## 2022-11-28 RX ADMIN — LISINOPRIL 2.5 MILLIGRAM(S): 2.5 TABLET ORAL at 05:33

## 2022-11-28 RX ADMIN — Medication 1 MILLIGRAM(S): at 23:20

## 2022-11-28 RX ADMIN — Medication 1 MILLIGRAM(S): at 05:33

## 2022-11-28 RX ADMIN — Medication 2000 UNIT(S): at 13:40

## 2022-11-28 RX ADMIN — SODIUM CHLORIDE 1 GRAM(S): 9 INJECTION INTRAMUSCULAR; INTRAVENOUS; SUBCUTANEOUS at 23:20

## 2022-11-28 RX ADMIN — HALOPERIDOL DECANOATE 10 MILLIGRAM(S): 100 INJECTION INTRAMUSCULAR at 23:21

## 2022-11-28 NOTE — PHYSICAL THERAPY INITIAL EVALUATION ADULT - PERTINENT HX OF CURRENT PROBLEM, REHAB EVAL
Pt presents to [facility] s/p fall Pt from [facility] presents to [facility] s/p fall

## 2022-11-28 NOTE — BH CONSULTATION LIAISON ASSESSMENT NOTE - HPI (INCLUDE ILLNESS QUALITY, SEVERITY, DURATION, TIMING, CONTEXT, MODIFYING FACTORS, ASSOCIATED SIGNS AND SYMPTOMS)
Patient is a 51 year old male who resides with his wife and 13 y/o son, with a past psychiatric history of schizoaffective disorder depressive type, following with [facility] and with no history of substance abuse and with a PMH of breast CA / Lymphoma and HTN who is admitted to [facility] for treatment of psychosis who sent patient to [facility] for medical work up s/p fall.     documentation from [facility] reviewed and patient currently refractory to treatment. Has been tried on multiple antipsychotics, was recently stopped on Clozapine after having an elevated CRP. Patient also with frequent falls and currently sent to [facility] for workup of falls to r/o cardiac origin and r/o possibility of metastatic disease     Patient seen and evaluated and found to be laying in bed with 1 to 1 at bedside. Patient alert, oriented x3 but disorganized with pressures speech. Patient reapting "you mother fuckers" over and over (Catatonia?), and talking the "the lord sloan" and being over a thousand years old. Patient minimally cooperative, disorganized and delusional and refused to answer any further questions.     Currently admitted to [facility] and as per last progress note, has been tried on Prolixin, seroquel, haldol, zyprexa, geodon, risperdal, zoloft, wellbutrin and abilify.   Current meds   Haldol 5mg po daily and 10mg PO QHS  Cogentin 0.5mg BID   Ativan 1mg po TID for likely catatonia   Clozapine 250 recently stopped due to elevated CRP   Plan for [facility] hospital referral

## 2022-11-28 NOTE — CHART NOTE - NSCHARTNOTEFT_GEN_A_CORE
Patient attempting to leave the building, very agitated and unable to be redirected   Code flight called, security at bedside   Patient due for Haldol 10mg PO and Ativan 1mg PO, patient refusing all PO doses of Haldol and Ativan   Haldol 5mg IM x1 given without effect, patient still trying to leave and becoming extremely aggressive to staff  Ativan 2mg IM x 1 given   1:1 in place   RN to notify with any acute changes

## 2022-11-28 NOTE — PHYSICAL THERAPY INITIAL EVALUATION ADULT - ADDITIONAL COMMENTS
Pt not providing clear previous level of function or social hx at the time of eval. Pt pleasant during time of eval however ends every sentence with "mother fucker". Pt following most commands but is impulsive, attempting to dance during OOB assessment, require assistance to maintain balance.

## 2022-11-28 NOTE — PROGRESS NOTE ADULT - ASSESSMENT
Patient is a 52 year old male with PMH of breast CA in remission, lymphoma, HTN, treatment-resistant schizoaffective disorder depressive type, sent in after a fall and recently with worsening disorganized behaviour.      Problem/Plan - 1:  ·  Problem: Disorganized behavior; history of Schizoaffective disorder  ·  Plan: unclear etiology; repetitive speech, intermittently agitated  -on constant observation    -haldol adjusted to BID  -continue ativan 1 mg BID  -continue cogentin  -seen by Dr. Henry in the ED on Friday and will be reassessed to return to [facility] on 11/28 as discussed with psych NP     Problem/Plan - 2:  ·  Problem: Confusion vs worsening underlying psych disease  ·  Plan: CTH and MRI negative.  B12 low/normal - continue injections as ordered  Check TSH and ammonia level  Check UA     Problem/Plan - 3:  ·  Problem: Frequent Falls   ·  Plan:  Unclear if this is intentional due to psychological illness but imaging is negative  CT head/ neck no acute findings. MRI brain also negative.  Check orthostatics  Acute minimally displaced right lateral seventh rib fracture. Healing distal left rib fractures.  fall precautions  PT evaluation (uncooperative today)     Problem/Plan - 4:  ·  Problem: History of breast cancer.   ·  Plan: MRI brain  negative for mets  -outpatient hem/onc follow up to complete course of treatment     Problem/Plan - 5:  ·  Problem: HTN (hypertension).   ·  Plan: BP stable; continue lisinopril     Problem/Plan - 6:  ·  Problem: HFrEF; unclear if acute or chronic  -TTE with EF of 40-45% for which cardio was consulted  -elevated inflammatory markers and [facility] was concerned for possible clozapine inducted myocarditis  -no evidence of decompensated heart failure  -TNI negative and non ischemic EKG changes  -seen by cardiology on 11/26 and recommended outpatient follow up with primary cardiology     Problem/Plan - 7:  ·  Problem: Mild Hyponatremia possibly due to polydipsia vs SIADH  -appears euvolemic  -urine studies reviewed  -repeat BMP  -monitor I/os  -free water restriction  -add salt tabs  -repeat BMP in AM    DVT ppx - Heparin SC    Dispo - PT evaluation in AM in efforts to safely discharge patient back to [facility].    Plan discussed with patient, SW, RN, Dr. Henry

## 2022-11-28 NOTE — PHYSICAL THERAPY INITIAL EVALUATION ADULT - NSPTDISCHREC_GEN_A_CORE
unclear at this time, pt will require follow up and input from interdisciplinary team when appropriate - social work made aware of performance during evaluation

## 2022-11-28 NOTE — BH CONSULTATION LIAISON ASSESSMENT NOTE - OTHER PAST PSYCHIATRIC HISTORY (INCLUDE DETAILS REGARDING ONSET, COURSE OF ILLNESS, INPATIENT/OUTPATIENT TREATMENT)
see HPI   h/o schizophrenia and follows with Dr Arreola at [facility] and previously on Prolixin decanoate 50mg PO Q4 weeks and  seroquel 200mg PO BID and Wellbutrin SR 200mg PO daily.     Currently admitted to [facility] and as per last progress note, has been tried on Prolixin, seroquel, haldol, zyprexa, geodon, risperdal, zoloft, wellbutrin and abilify.

## 2022-11-28 NOTE — PROGRESS NOTE ADULT - SUBJECTIVE AND OBJECTIVE BOX
CHIEF COMPLAINT/INTERVAL HISTORY:    Patient is a 52y old  Male who presents with a chief complaint of Disorganized behaviour / confusion / fall (2022 17:40)    SUBJECTIVE & OBJECTIVE: Pt seen and examined at bedside. No overnight events. Not following any commands; repetitive speech using vulgar language. Agitated earlier. Medications adjusted; not on correct dose of haldol.    ROS: Unobtainable    ICU Vital Signs Last 24 Hrs  T(C): 37.4 (2022 16:14), Max: 37.6 (2022 05:42)  T(F): 99.4 (2022 16:14), Max: 99.7 (2022 05:42)  HR: 103 (2022 10:19) (80 - 103)  BP: 117/62 (2022 16:14) (117/62 - 131/74)  RR: 18 (2022 16:14) (18 - 20)  SpO2: 96% (2022 16:14) (96% - 99%)    O2 Parameters below as of 2022 10:19  Patient On (Oxygen Delivery Method): room air      MEDICATIONS  (STANDING):  ascorbic acid 500 milliGRAM(s) Oral daily  benztropine 0.5 milliGRAM(s) Oral every 12 hours  cholecalciferol 2000 Unit(s) Oral daily  cyanocobalamin Injectable 1000 MICROGram(s) SubCutaneous daily  ferrous    sulfate 325 milliGRAM(s) Oral daily  haloperidol     Tablet 5 milliGRAM(s) Oral <User Schedule>  haloperidol     Tablet 10 milliGRAM(s) Oral <User Schedule>  heparin   Injectable 5000 Unit(s) SubCutaneous every 12 hours  lisinopril 2.5 milliGRAM(s) Oral daily  LORazepam     Tablet 1 milliGRAM(s) Oral three times a day  pantoprazole    Tablet 40 milliGRAM(s) Oral before breakfast  polyethylene glycol 3350 17 Gram(s) Oral daily  senna 2 Tablet(s) Oral at bedtime  sodium chloride 1 Gram(s) Oral three times a day    MEDICATIONS  (PRN):  acetaminophen     Tablet .. 650 milliGRAM(s) Oral every 6 hours PRN Mild Pain (1 - 3), Moderate Pain (4 - 6)  guaiFENesin Oral Liquid (Sugar-Free) 200 milliGRAM(s) Oral every 6 hours PRN Cough  haloperidol     Tablet 5 milliGRAM(s) Oral every 6 hours PRN agitation  LORazepam     Tablet 2 milliGRAM(s) Oral every 6 hours PRN Anxiety      LABS:                        11.6   6.05  )-----------( 360      ( 2022 07:02 )             32.1         132<L>  |  95<L>  |  17.4  ----------------------------<  98  4.4   |  24.0  |  0.96    Ca    9.3      2022 07:02  Phos  3.5       Mg     2.0             Urinalysis Basic - ( 2022 05:20 )    Color: Yellow / Appearance: Clear / S.010 / pH: x  Gluc: x / Ketone: Small  / Bili: Negative / Urobili: 1 mg/dL   Blood: x / Protein: 15 / Nitrite: Negative   Leuk Esterase: Negative / RBC: Negative /HPF / WBC 0-2 /HPF   Sq Epi: x / Non Sq Epi: Occasional / Seamus     PHYSICAL EXAM:    GENERAL: middle aged male, sitting in bed, NAD, flat affect  HEAD:  Atraumatic, Normocephalic  EYES: EOMI, PERRLA, conjunctiva and sclera clear  ENMT: Moist mucous membranes  NECK: Supple   NERVOUS SYSTEM:  Alert & Oriented X1  CHEST/LUNG: Clear to auscultation bilaterally   HEART: Regular rate and rhythm; + S1/S2  ABDOMEN: Soft, Nontender, Nondistended; Bowel sounds present  EXTREMITIES:  no pedal edema   CHIEF COMPLAINT/INTERVAL HISTORY:    Patient is a 52y old  Male who presents with a chief complaint of Disorganized behaviour / confusion / fall (2022 17:40)    SUBJECTIVE & OBJECTIVE: Pt seen and examined at bedside. No overnight events. Not following any commands; repetitive speech using vulgar language. Agitated earlier. Medications adjusted; not on correct dose of haldol.    ROS: Unobtainable    ICU Vital Signs Last 24 Hrs  T(C): 37.4 (2022 16:14), Max: 37.6 (2022 05:42)  T(F): 99.4 (2022 16:14), Max: 99.7 (2022 05:42)  HR: 103 (2022 10:19) (80 - 103)  BP: 117/62 (2022 16:14) (117/62 - 131/74)  RR: 18 (2022 16:14) (18 - 20)  SpO2: 96% (2022 16:14) (96% - 99%)    O2 Parameters below as of 2022 10:19  Patient On (Oxygen Delivery Method): room air      MEDICATIONS  (STANDING):  ascorbic acid 500 milliGRAM(s) Oral daily  benztropine 0.5 milliGRAM(s) Oral every 12 hours  cholecalciferol 2000 Unit(s) Oral daily  cyanocobalamin Injectable 1000 MICROGram(s) SubCutaneous daily  ferrous    sulfate 325 milliGRAM(s) Oral daily  haloperidol     Tablet 5 milliGRAM(s) Oral <User Schedule>  haloperidol     Tablet 10 milliGRAM(s) Oral <User Schedule>  heparin   Injectable 5000 Unit(s) SubCutaneous every 12 hours  lisinopril 2.5 milliGRAM(s) Oral daily  LORazepam     Tablet 1 milliGRAM(s) Oral three times a day  pantoprazole    Tablet 40 milliGRAM(s) Oral before breakfast  polyethylene glycol 3350 17 Gram(s) Oral daily  senna 2 Tablet(s) Oral at bedtime  sodium chloride 1 Gram(s) Oral three times a day    MEDICATIONS  (PRN):  acetaminophen     Tablet .. 650 milliGRAM(s) Oral every 6 hours PRN Mild Pain (1 - 3), Moderate Pain (4 - 6)  guaiFENesin Oral Liquid (Sugar-Free) 200 milliGRAM(s) Oral every 6 hours PRN Cough  haloperidol     Tablet 5 milliGRAM(s) Oral every 6 hours PRN agitation  LORazepam     Tablet 2 milliGRAM(s) Oral every 6 hours PRN Anxiety      LABS:                        11.6   6.05  )-----------( 360      ( 2022 07:02 )             32.1         132<L>  |  95<L>  |  17.4  ----------------------------<  98  4.4   |  24.0  |  0.96    Ca    9.3      2022 07:02  Phos  3.5       Mg     2.0             Urinalysis Basic - ( 2022 05:20 )    Color: Yellow / Appearance: Clear / S.010 / pH: x  Gluc: x / Ketone: Small  / Bili: Negative / Urobili: 1 mg/dL   Blood: x / Protein: 15 / Nitrite: Negative   Leuk Esterase: Negative / RBC: Negative /HPF / WBC 0-2 /HPF   Sq Epi: x / Non Sq Epi: Occasional / Seamus     PHYSICAL EXAM:    GENERAL: middle aged male, sitting in bed, agitated, repetitive nonsensical speech   HEAD:  Atraumatic, Normocephalic  EYES: EOMI, PERRLA, conjunctiva and sclera clear  ENMT: Moist mucous membranes  NECK: Supple   NERVOUS SYSTEM:  Awake, unable to assess orientation  CHEST/LUNG: Clear to auscultation bilaterally   HEART: Regular rate and rhythm; + S1/S2  ABDOMEN: Soft, Nontender, Nondistended; Bowel sounds present  EXTREMITIES:  no pedal edema

## 2022-11-28 NOTE — BH CONSULTATION LIAISON ASSESSMENT NOTE - NSBHCHARTREVIEWLAB_PSY_A_CORE FT
Basic Metabolic Panel (09.10.22 @ 07:40)    Sodium, Serum: 137 mmol/L    Potassium, Serum: 4.3 mmol/L    Chloride, Serum: 103 mmol/L    Carbon Dioxide, Serum: 23.0 mmol/L    Anion Gap, Serum: 11 mmol/L    Blood Urea Nitrogen, Serum: 9.7 mg/dL    Creatinine, Serum: 0.90 mg/dL    Glucose, Serum: 99 mg/dL    Calcium, Total Serum: 8.8: Prior Reference Range of 8.6 – 10.2 was amended as of 7/26/2022 to 8.4 –  10.5. mg/dL    eGFR: 103: The estimated glomerular filtration rate (eGFR) is calculated using the  2021 CKD-EPI creatinine equation, which does not have a coefficient for  race and is validated in individuals 18 years of age and older (N Engl J  Med 2021; 385:3720-9261). Creatinine-based eGFR may be inaccurate in  various situations including but not limited to extremes of muscle mass,  altered dietary protein intake, or medications that affect renal tubular  creatinine secretion. mL/min/1.73m2

## 2022-11-28 NOTE — PHYSICAL THERAPY INITIAL EVALUATION ADULT - GAIT DISTANCE, PT EVAL
15ft, limited as pt impulsive during time of eval - not listening to requests to refrain from dancing

## 2022-11-28 NOTE — BH CONSULTATION LIAISON ASSESSMENT NOTE - SUMMARY
Patient is a 51 year old male who resides with his wife and 11 y/o son, with a past psychiatric history of schizoaffective disorder depressive type, following with [facility] and with no history of substance abuse and with a PMH of breast CA / Lymphoma and HTN who is admitted to [facility] for treatment of psychosis who sent patient to [facility] for medical work up s/p fall.     Patient seen and evaluated and currently presents grossly disorganized, tangential and with delusions of paranoia. Medical and cardiology recs appreciated, no signs of metastatic disease and cleared by cardiology. Patient not fully cooperating with PT. once cleared by PT, likely 2PC transfer back to [facility]       Recs   -recommend to keep on 1 to 1 observation for elopement risk and impulsivity   -Haldol 5mg po daily and 10mg QHS  -Cogentin 0.5mg PO BID   -Ativan 1mg PO TID   -If medically cleared, recommend 2PC admission to inpt psychiatry   -Will follow

## 2022-11-28 NOTE — BH CONSULTATION LIAISON ASSESSMENT NOTE - CURRENT MEDICATION
MEDICATIONS  (STANDING):  ascorbic acid 500 milliGRAM(s) Oral daily  benztropine 0.5 milliGRAM(s) Oral every 12 hours  cholecalciferol 2000 Unit(s) Oral daily  cyanocobalamin Injectable 1000 MICROGram(s) SubCutaneous daily  ferrous    sulfate 325 milliGRAM(s) Oral daily  haloperidol     Tablet 5 milliGRAM(s) Oral every 12 hours  heparin   Injectable 5000 Unit(s) SubCutaneous every 12 hours  lisinopril 2.5 milliGRAM(s) Oral daily  LORazepam     Tablet 1 milliGRAM(s) Oral three times a day  pantoprazole    Tablet 40 milliGRAM(s) Oral before breakfast  polyethylene glycol 3350 17 Gram(s) Oral daily  senna 2 Tablet(s) Oral at bedtime  sodium chloride 0.9%. 1000 milliLiter(s) (75 mL/Hr) IV Continuous <Continuous>    MEDICATIONS  (PRN):  acetaminophen     Tablet .. 650 milliGRAM(s) Oral every 6 hours PRN Mild Pain (1 - 3), Moderate Pain (4 - 6)  guaiFENesin Oral Liquid (Sugar-Free) 200 milliGRAM(s) Oral every 6 hours PRN Cough  haloperidol     Tablet 5 milliGRAM(s) Oral every 6 hours PRN agitation  LORazepam     Tablet 2 milliGRAM(s) Oral every 6 hours PRN Anxiety

## 2022-11-28 NOTE — BH CONSULTATION LIAISON ASSESSMENT NOTE - NSBHCHARTREVIEWVS_PSY_A_CORE FT
Vital Signs Last 24 Hrs  T(C): 36.8 (28 Nov 2022 10:19), Max: 37.6 (28 Nov 2022 05:42)  T(F): 98.2 (28 Nov 2022 10:19), Max: 99.7 (28 Nov 2022 05:42)  HR: 103 (28 Nov 2022 10:19) (80 - 103)  BP: 118/78 (28 Nov 2022 10:19) (118/78 - 131/74)  BP(mean): --  RR: 19 (28 Nov 2022 10:19) (18 - 20)  SpO2: 99% (28 Nov 2022 10:19) (96% - 99%)    Parameters below as of 28 Nov 2022 10:19  Patient On (Oxygen Delivery Method): room air

## 2022-11-29 LAB
ANION GAP SERPL CALC-SCNC: 10 MMOL/L — SIGNIFICANT CHANGE UP (ref 5–17)
ANION GAP SERPL CALC-SCNC: 13 MMOL/L — SIGNIFICANT CHANGE UP (ref 5–17)
BUN SERPL-MCNC: 26 MG/DL — HIGH (ref 8–20)
BUN SERPL-MCNC: 30.5 MG/DL — HIGH (ref 8–20)
CALCIUM SERPL-MCNC: 9.1 MG/DL — SIGNIFICANT CHANGE UP (ref 8.4–10.5)
CALCIUM SERPL-MCNC: 9.5 MG/DL — SIGNIFICANT CHANGE UP (ref 8.4–10.5)
CHLORIDE SERPL-SCNC: 102 MMOL/L — SIGNIFICANT CHANGE UP (ref 96–108)
CHLORIDE SERPL-SCNC: 97 MMOL/L — SIGNIFICANT CHANGE UP (ref 96–108)
CO2 SERPL-SCNC: 22 MMOL/L — SIGNIFICANT CHANGE UP (ref 22–29)
CO2 SERPL-SCNC: 25 MMOL/L — SIGNIFICANT CHANGE UP (ref 22–29)
CREAT SERPL-MCNC: 1.32 MG/DL — HIGH (ref 0.5–1.3)
CREAT SERPL-MCNC: 1.38 MG/DL — HIGH (ref 0.5–1.3)
EGFR: 61 ML/MIN/1.73M2 — SIGNIFICANT CHANGE UP
EGFR: 64 ML/MIN/1.73M2 — SIGNIFICANT CHANGE UP
GLUCOSE SERPL-MCNC: 95 MG/DL — SIGNIFICANT CHANGE UP (ref 70–99)
GLUCOSE SERPL-MCNC: 98 MG/DL — SIGNIFICANT CHANGE UP (ref 70–99)
POTASSIUM SERPL-MCNC: 4.1 MMOL/L — SIGNIFICANT CHANGE UP (ref 3.5–5.3)
POTASSIUM SERPL-MCNC: 4.2 MMOL/L — SIGNIFICANT CHANGE UP (ref 3.5–5.3)
POTASSIUM SERPL-SCNC: 4.1 MMOL/L — SIGNIFICANT CHANGE UP (ref 3.5–5.3)
POTASSIUM SERPL-SCNC: 4.2 MMOL/L — SIGNIFICANT CHANGE UP (ref 3.5–5.3)
SODIUM SERPL-SCNC: 132 MMOL/L — LOW (ref 135–145)
SODIUM SERPL-SCNC: 137 MMOL/L — SIGNIFICANT CHANGE UP (ref 135–145)

## 2022-11-29 PROCEDURE — 99233 SBSQ HOSP IP/OBS HIGH 50: CPT

## 2022-11-29 RX ORDER — HALOPERIDOL DECANOATE 100 MG/ML
5 INJECTION INTRAMUSCULAR ONCE
Refills: 0 | Status: COMPLETED | OUTPATIENT
Start: 2022-11-29 | End: 2022-11-29

## 2022-11-29 RX ORDER — SODIUM CHLORIDE 9 MG/ML
1000 INJECTION INTRAMUSCULAR; INTRAVENOUS; SUBCUTANEOUS
Refills: 0 | Status: DISCONTINUED | OUTPATIENT
Start: 2022-11-29 | End: 2022-12-08

## 2022-11-29 RX ADMIN — Medication 325 MILLIGRAM(S): at 13:30

## 2022-11-29 RX ADMIN — Medication 1 MILLIGRAM(S): at 18:40

## 2022-11-29 RX ADMIN — Medication 0.5 MILLIGRAM(S): at 18:39

## 2022-11-29 RX ADMIN — SODIUM CHLORIDE 1 GRAM(S): 9 INJECTION INTRAMUSCULAR; INTRAVENOUS; SUBCUTANEOUS at 18:39

## 2022-11-29 RX ADMIN — Medication 500 MILLIGRAM(S): at 14:00

## 2022-11-29 RX ADMIN — Medication 1 MILLIGRAM(S): at 21:25

## 2022-11-29 RX ADMIN — Medication 2 MILLIGRAM(S): at 23:31

## 2022-11-29 RX ADMIN — Medication 0.5 MILLIGRAM(S): at 05:45

## 2022-11-29 RX ADMIN — HALOPERIDOL DECANOATE 5 MILLIGRAM(S): 100 INJECTION INTRAMUSCULAR at 13:30

## 2022-11-29 RX ADMIN — HALOPERIDOL DECANOATE 10 MILLIGRAM(S): 100 INJECTION INTRAMUSCULAR at 21:26

## 2022-11-29 RX ADMIN — Medication 2000 UNIT(S): at 13:30

## 2022-11-29 RX ADMIN — SODIUM CHLORIDE 1 GRAM(S): 9 INJECTION INTRAMUSCULAR; INTRAVENOUS; SUBCUTANEOUS at 21:25

## 2022-11-29 RX ADMIN — HEPARIN SODIUM 5000 UNIT(S): 5000 INJECTION INTRAVENOUS; SUBCUTANEOUS at 18:41

## 2022-11-29 RX ADMIN — SENNA PLUS 2 TABLET(S): 8.6 TABLET ORAL at 21:25

## 2022-11-29 NOTE — PROGRESS NOTE ADULT - SUBJECTIVE AND OBJECTIVE BOX
CHIEF COMPLAINT/INTERVAL HISTORY:    Patient is a 52y old  Male who presents with a chief complaint of Disorganized behaviour / confusion / fall (2022 18:00)    SUBJECTIVE & OBJECTIVE: Pt seen and examined at bedside. Agitated and a code flight yesterday. S/p Haldol/Ativan and sleepy today. DAVID noted. Started on gentle hydration.    ROS: Unobtainable    ICU Vital Signs Last 24 Hrs  T(C): 36.8 (2022 12:56), Max: 37.5 (2022 19:34)  T(F): 98.3 (2022 12:56), Max: 99.5 (2022 19:34)  HR: 98 (2022 12:56) (95 - 107)  BP: 118/76 (2022 12:56) (118/76 - 153/95)  RR: 17 (2022 12:56) (17 - 18)  SpO2: 96% (2022 12:56) (95% - 97%)    O2 Parameters below as of 2022 12:56  Patient On (Oxygen Delivery Method): room air      MEDICATIONS  (STANDING):  ascorbic acid 500 milliGRAM(s) Oral daily  benztropine 0.5 milliGRAM(s) Oral every 12 hours  cholecalciferol 2000 Unit(s) Oral daily  cyanocobalamin Injectable 1000 MICROGram(s) SubCutaneous daily  ferrous    sulfate 325 milliGRAM(s) Oral daily  haloperidol     Tablet 5 milliGRAM(s) Oral <User Schedule>  haloperidol     Tablet 10 milliGRAM(s) Oral <User Schedule>  heparin   Injectable 5000 Unit(s) SubCutaneous every 12 hours  lisinopril 2.5 milliGRAM(s) Oral daily  LORazepam     Tablet 1 milliGRAM(s) Oral three times a day  pantoprazole    Tablet 40 milliGRAM(s) Oral before breakfast  polyethylene glycol 3350 17 Gram(s) Oral daily  senna 2 Tablet(s) Oral at bedtime  sodium chloride 1 Gram(s) Oral three times a day  sodium chloride 0.9%. 1000 milliLiter(s) (100 mL/Hr) IV Continuous <Continuous>    MEDICATIONS  (PRN):  acetaminophen     Tablet .. 650 milliGRAM(s) Oral every 6 hours PRN Mild Pain (1 - 3), Moderate Pain (4 - 6)  guaiFENesin Oral Liquid (Sugar-Free) 200 milliGRAM(s) Oral every 6 hours PRN Cough  haloperidol     Tablet 5 milliGRAM(s) Oral every 6 hours PRN agitation  LORazepam   Injectable 2 milliGRAM(s) IntraMuscular every 6 hours PRN Agitation      LABS:                        11.6   6.05  )-----------( 360      ( 2022 07:02 )             32.1         137  |  102  |  30.5<H>  ----------------------------<  98  4.2   |  25.0  |  1.38<H>    Ca    9.1      2022 13:33  Phos  3.5       Mg     2.0             Urinalysis Basic - ( 2022 05:20 )    Color: Yellow / Appearance: Clear / S.010 / pH: x  Gluc: x / Ketone: Small  / Bili: Negative / Urobili: 1 mg/dL   Blood: x / Protein: 15 / Nitrite: Negative   Leuk Esterase: Negative / RBC: Negative /HPF / WBC 0-2 /HPF   Sq Epi: x / Non Sq Epi: Occasional / Bacteria: Few    PHYSICAL EXAM:    GENERAL: middle aged male, laying in bed, calm, disengaged  HEAD:  Atraumatic, Normocephalic  EYES: EOMI, PERRLA, conjunctiva and sclera clear  ENMT: Moist mucous membranes  NECK: Supple   CHEST/LUNG: Clear to auscultation bilaterally   HEART: Regular rate and rhythm; + S1/S2  ABDOMEN: Soft, Nontender, Nondistended; Bowel sounds present  EXTREMITIES:  no pedal edema

## 2022-11-29 NOTE — PROGRESS NOTE ADULT - ASSESSMENT
Patient is a 52 year old male with PMH of breast CA in remission, lymphoma, HTN, treatment-resistant schizoaffective disorder depressive type, sent in after a fall and recently with worsening disorganized behaviour.      Problem/Plan - 1:  ·  Problem: Disorganized behavior; history of Schizoaffective disorder  ·  Plan: agitated overnight s/p IM haldol/ativan  -on constant observation    -continue haldol BID  -continue ativan 1 mg TID  -continue cogentin  -PRN IM Haldol and Ativan  -eventual return to [facility] once medically stable     Problem/Plan - 2:  ·  Problem: Confusion vs worsening underlying psych disease  ·  Plan: CTH and MRI negative.  B12 low/normal - continue injections as ordered  TSH and ammonia level negative  UA negative     Problem/Plan - 3:  ·  Problem: Frequent Falls   ·  Plan:  Unclear if this is intentional due to psychological illness but imaging is negative  CT head/ neck no acute findings. MRI brain also negative.  Uncooperative to obtain orthostatics  Acute minimally displaced right lateral seventh rib fracture. Healing distal left rib fractures.  fall precautions  Uncooperative with PT evaluation but was a code flight overnight and ambulating without any acute issues     Problem/Plan - 4:  ·  Problem: History of breast cancer.   ·  Plan: MRI brain  negative for mets  -outpatient hem/onc follow up to complete course of treatment     Problem/Plan - 5:  ·  Problem: HTN (hypertension).   ·  Plan: BP stable; continue lisinopril     Problem/Plan - 6:  ·  Problem: HFrEF; unclear if acute or chronic  -TTE with EF of 40-45% for which cardio was consulted  -elevated inflammatory markers and [facility] was concerned for possible clozapine inducted myocarditis  -no evidence of decompensated heart failure  -TNI negative and non ischemic EKG changes  -seen by cardiology on 11/26 and recommended outpatient follow up with primary cardiology     Problem/Plan - 7:  ·  Problem: Mild Hyponatremia possibly due to polydipsia vs SIADH - resolved  -appears euvolemic  -urine studies reviewed  -repeat BMP with sodium of 137  -monitor I/os  -continue salt tabs and free water restriction     Problem/Plan - 8:  ·  Problem: DAVID likely due to prerenal azotemia  -Creatinine 1.38  -UA bland  -check bladder scan  -IVF  -strict I/Os, daily weights  -repeat BMP in AM    DVT ppx - Heparin SC    Dispo - IVF for DAVID. PT evaluation.     Plan discussed with Dr. Car, RN, SW

## 2022-11-30 LAB
ANION GAP SERPL CALC-SCNC: 13 MMOL/L — SIGNIFICANT CHANGE UP (ref 5–17)
BASOPHILS # BLD AUTO: 0.05 K/UL — SIGNIFICANT CHANGE UP (ref 0–0.2)
BASOPHILS NFR BLD AUTO: 0.5 % — SIGNIFICANT CHANGE UP (ref 0–2)
BUN SERPL-MCNC: 23.8 MG/DL — HIGH (ref 8–20)
CALCIUM SERPL-MCNC: 9.5 MG/DL — SIGNIFICANT CHANGE UP (ref 8.4–10.5)
CHLORIDE SERPL-SCNC: 102 MMOL/L — SIGNIFICANT CHANGE UP (ref 96–108)
CO2 SERPL-SCNC: 24 MMOL/L — SIGNIFICANT CHANGE UP (ref 22–29)
CREAT SERPL-MCNC: 0.94 MG/DL — SIGNIFICANT CHANGE UP (ref 0.5–1.3)
EGFR: 97 ML/MIN/1.73M2 — SIGNIFICANT CHANGE UP
EOSINOPHIL # BLD AUTO: 0.03 K/UL — SIGNIFICANT CHANGE UP (ref 0–0.5)
EOSINOPHIL NFR BLD AUTO: 0.3 % — SIGNIFICANT CHANGE UP (ref 0–6)
GLUCOSE SERPL-MCNC: 87 MG/DL — SIGNIFICANT CHANGE UP (ref 70–99)
HCT VFR BLD CALC: 32 % — LOW (ref 39–50)
HGB BLD-MCNC: 11.7 G/DL — LOW (ref 13–17)
IMM GRANULOCYTES NFR BLD AUTO: 0.5 % — SIGNIFICANT CHANGE UP (ref 0–0.9)
LYMPHOCYTES # BLD AUTO: 1.55 K/UL — SIGNIFICANT CHANGE UP (ref 1–3.3)
LYMPHOCYTES # BLD AUTO: 17 % — SIGNIFICANT CHANGE UP (ref 13–44)
MAGNESIUM SERPL-MCNC: 2.1 MG/DL — SIGNIFICANT CHANGE UP (ref 1.6–2.6)
MCHC RBC-ENTMCNC: 27.8 PG — SIGNIFICANT CHANGE UP (ref 27–34)
MCHC RBC-ENTMCNC: 36.6 GM/DL — HIGH (ref 32–36)
MCV RBC AUTO: 76 FL — LOW (ref 80–100)
MONOCYTES # BLD AUTO: 0.98 K/UL — HIGH (ref 0–0.9)
MONOCYTES NFR BLD AUTO: 10.7 % — SIGNIFICANT CHANGE UP (ref 2–14)
NEUTROPHILS # BLD AUTO: 6.47 K/UL — SIGNIFICANT CHANGE UP (ref 1.8–7.4)
NEUTROPHILS NFR BLD AUTO: 71 % — SIGNIFICANT CHANGE UP (ref 43–77)
PHOSPHATE SERPL-MCNC: 3.8 MG/DL — SIGNIFICANT CHANGE UP (ref 2.4–4.7)
PLATELET # BLD AUTO: 328 K/UL — SIGNIFICANT CHANGE UP (ref 150–400)
POTASSIUM SERPL-MCNC: 4.6 MMOL/L — SIGNIFICANT CHANGE UP (ref 3.5–5.3)
POTASSIUM SERPL-SCNC: 4.6 MMOL/L — SIGNIFICANT CHANGE UP (ref 3.5–5.3)
RBC # BLD: 4.21 M/UL — SIGNIFICANT CHANGE UP (ref 4.2–5.8)
RBC # FLD: 16 % — HIGH (ref 10.3–14.5)
SODIUM SERPL-SCNC: 139 MMOL/L — SIGNIFICANT CHANGE UP (ref 135–145)
WBC # BLD: 9.13 K/UL — SIGNIFICANT CHANGE UP (ref 3.8–10.5)
WBC # FLD AUTO: 9.13 K/UL — SIGNIFICANT CHANGE UP (ref 3.8–10.5)

## 2022-11-30 PROCEDURE — 99233 SBSQ HOSP IP/OBS HIGH 50: CPT

## 2022-11-30 RX ORDER — HALOPERIDOL DECANOATE 100 MG/ML
5 INJECTION INTRAMUSCULAR ONCE
Refills: 0 | Status: COMPLETED | OUTPATIENT
Start: 2022-11-30 | End: 2022-11-30

## 2022-11-30 RX ORDER — HALOPERIDOL DECANOATE 100 MG/ML
5 INJECTION INTRAMUSCULAR EVERY 6 HOURS
Refills: 0 | Status: DISCONTINUED | OUTPATIENT
Start: 2022-11-30 | End: 2022-12-02

## 2022-11-30 RX ADMIN — HEPARIN SODIUM 5000 UNIT(S): 5000 INJECTION INTRAVENOUS; SUBCUTANEOUS at 18:05

## 2022-11-30 RX ADMIN — HALOPERIDOL DECANOATE 5 MILLIGRAM(S): 100 INJECTION INTRAMUSCULAR at 00:02

## 2022-11-30 RX ADMIN — POLYETHYLENE GLYCOL 3350 17 GRAM(S): 17 POWDER, FOR SOLUTION ORAL at 11:35

## 2022-11-30 RX ADMIN — HALOPERIDOL DECANOATE 5 MILLIGRAM(S): 100 INJECTION INTRAMUSCULAR at 08:29

## 2022-11-30 RX ADMIN — HALOPERIDOL DECANOATE 5 MILLIGRAM(S): 100 INJECTION INTRAMUSCULAR at 03:58

## 2022-11-30 RX ADMIN — HALOPERIDOL DECANOATE 10 MILLIGRAM(S): 100 INJECTION INTRAMUSCULAR at 21:41

## 2022-11-30 RX ADMIN — Medication 325 MILLIGRAM(S): at 11:29

## 2022-11-30 RX ADMIN — SODIUM CHLORIDE 1 GRAM(S): 9 INJECTION INTRAMUSCULAR; INTRAVENOUS; SUBCUTANEOUS at 15:21

## 2022-11-30 RX ADMIN — Medication 0.5 MILLIGRAM(S): at 18:05

## 2022-11-30 RX ADMIN — PREGABALIN 1000 MICROGRAM(S): 225 CAPSULE ORAL at 11:29

## 2022-11-30 RX ADMIN — Medication 2 MILLIGRAM(S): at 10:40

## 2022-11-30 RX ADMIN — HALOPERIDOL DECANOATE 5 MILLIGRAM(S): 100 INJECTION INTRAMUSCULAR at 10:39

## 2022-11-30 RX ADMIN — Medication 1 MILLIGRAM(S): at 08:29

## 2022-11-30 RX ADMIN — Medication 2 MILLIGRAM(S): at 05:27

## 2022-11-30 RX ADMIN — Medication 1 MILLIGRAM(S): at 21:41

## 2022-11-30 RX ADMIN — Medication 2000 UNIT(S): at 11:28

## 2022-11-30 RX ADMIN — Medication 0.5 MILLIGRAM(S): at 05:58

## 2022-11-30 RX ADMIN — Medication 1 MILLIGRAM(S): at 15:21

## 2022-11-30 RX ADMIN — SODIUM CHLORIDE 1 GRAM(S): 9 INJECTION INTRAMUSCULAR; INTRAVENOUS; SUBCUTANEOUS at 21:41

## 2022-11-30 RX ADMIN — Medication 500 MILLIGRAM(S): at 11:29

## 2022-11-30 RX ADMIN — SENNA PLUS 2 TABLET(S): 8.6 TABLET ORAL at 21:41

## 2022-11-30 NOTE — CHART NOTE - NSCHARTNOTEFT_GEN_A_CORE
Required Haldol 5mg IM x2 overnight for extreme agitation and aggression. Patient refusing to take ordered PO psych medications. Patient placed in b/l wrist restraints as patient attempted to strike staff, patient unable to be redirected.   I have evaluated this patient and have determined that restraints are warranted to optimize medical care. Patient was assessed for current physical and psychological risk factors as well as special needs. There are no medical conditions or limitations that would place this patient at risk while in restraints. Dr. Goyal made aware. Required Haldol 5mg IM x2 overnight for extreme agitation and aggression. Patient also given PRN IM Ativan. Patient refusing to take ordered PO psych medications. Patient placed in b/l wrist restraints as patient attempted to strike staff, patient unable to be redirected.   I have evaluated this patient and have determined that restraints are warranted to optimize medical care. Patient was assessed for current physical and psychological risk factors as well as special needs. There are no medical conditions or limitations that would place this patient at risk while in restraints. Dr. Goyal made aware. Required Haldol 5mg IM x2 overnight for extreme agitation and aggression. Patient also given PRN IM Ativan. Patient refusing to take ordered PO psych medications. Patient placed in b/l wrist restraints as patient attempted to strike staff, patient unable to be redirected. 1:1 in place.   I have evaluated this patient and have determined that restraints are warranted to optimize medical care. Patient was assessed for current physical and psychological risk factors as well as special needs. There are no medical conditions or limitations that would place this patient at risk while in restraints. Dr. Goyal made aware.

## 2022-11-30 NOTE — PROGRESS NOTE ADULT - SUBJECTIVE AND OBJECTIVE BOX
CHIEF COMPLAINT/INTERVAL HISTORY:    Patient is a 52y old  Male who presents with a chief complaint of Disorganized behaviour / confusion / fall (29 Nov 2022 16:24)    SUBJECTIVE & OBJECTIVE: Pt seen and examined at bedside. Agitated again overnight. Received IM haldol/ativan and off restraints this afternoon. Labs improved; no indication for IVF. Medically cleared for discharge to [facility].    ROS: Unobtainable; due to erratic thought process    ICU Vital Signs Last 24 Hrs  T(C): 37.1 (30 Nov 2022 05:44), Max: 37.2 (29 Nov 2022 17:00)  T(F): 98.8 (30 Nov 2022 05:44), Max: 99 (29 Nov 2022 17:00)  HR: 115 (30 Nov 2022 05:44) (81 - 115)  BP: 112/67 (30 Nov 2022 05:44) (101/58 - 112/67)  RR: 20 (30 Nov 2022 05:44) (18 - 20)  SpO2: 96% (30 Nov 2022 05:44) (96% - 98%)    O2 Parameters below as of 30 Nov 2022 05:44  Patient On (Oxygen Delivery Method): room air    MEDICATIONS  (STANDING):  ascorbic acid 500 milliGRAM(s) Oral daily  benztropine 0.5 milliGRAM(s) Oral every 12 hours  cholecalciferol 2000 Unit(s) Oral daily  ferrous    sulfate 325 milliGRAM(s) Oral daily  haloperidol     Tablet 5 milliGRAM(s) Oral <User Schedule>  haloperidol     Tablet 10 milliGRAM(s) Oral <User Schedule>  heparin   Injectable 5000 Unit(s) SubCutaneous every 12 hours  lisinopril 2.5 milliGRAM(s) Oral daily  LORazepam     Tablet 1 milliGRAM(s) Oral three times a day  pantoprazole    Tablet 40 milliGRAM(s) Oral before breakfast  polyethylene glycol 3350 17 Gram(s) Oral daily  senna 2 Tablet(s) Oral at bedtime  sodium chloride 1 Gram(s) Oral three times a day  sodium chloride 0.9%. 1000 milliLiter(s) (100 mL/Hr) IV Continuous <Continuous>    MEDICATIONS  (PRN):  acetaminophen     Tablet .. 650 milliGRAM(s) Oral every 6 hours PRN Mild Pain (1 - 3), Moderate Pain (4 - 6)  guaiFENesin Oral Liquid (Sugar-Free) 200 milliGRAM(s) Oral every 6 hours PRN Cough  haloperidol     Tablet 5 milliGRAM(s) Oral every 6 hours PRN agitation  haloperidol    Injectable 5 milliGRAM(s) IntraMuscular every 6 hours PRN Agitation  LORazepam   Injectable 2 milliGRAM(s) IntraMuscular every 6 hours PRN Agitation      LABS:                        11.7   9.13  )-----------( 328      ( 30 Nov 2022 10:36 )             32.0     11-30    139  |  102  |  23.8<H>  ----------------------------<  87  4.6   |  24.0  |  0.94    Ca    9.5      30 Nov 2022 10:36  Phos  3.8     11-30  Mg     2.1     11-30    PHYSICAL EXAM:    GENERAL: middle aged male, laying in bed, erratic thought process but answering questions  HEAD:  Atraumatic, Normocephalic  EYES: EOMI, PERRLA, conjunctiva and sclera clear  ENMT: Moist mucous membranes  NECK: Supple   CHEST/LUNG: Clear to auscultation bilaterally   HEART: Regular rate and rhythm; + S1/S2  ABDOMEN: Soft, Nontender, Nondistended; Bowel sounds present  EXTREMITIES:  no pedal edema

## 2022-11-30 NOTE — PROGRESS NOTE ADULT - ASSESSMENT
Patient is a 52 year old male with PMH of breast CA in remission, lymphoma, HTN, treatment-resistant schizoaffective disorder depressive type, sent in after a fall and recently with worsening disorganized behaviour.      Problem/Plan - 1:  ·  Problem: Disorganized behavior; history of Schizoaffective disorder  ·  Plan: agitated overnight s/p IM haldol/ativan  -on constant observation    -continue haldol BID  -continue ativan 1 mg TID  -continue cogentin  -PRN IM Haldol and Ativan together if agitated  -eventual return to [facility] once bed is available     Problem/Plan - 2:  ·  Problem: Confusion likely due to worsening underlying psych disease  ·  Plan: AAO x 3 today but with erratic thought process  CTH and MRI negative.  B12 low/normal - continue injections as ordered  TSH and ammonia level negative  UA negative     Problem/Plan - 3:  ·  Problem: Frequent Falls   ·  Plan:  Unclear if this is intentional due to psychological illness but imaging is negative  patient able to ambulate without assistance  CT head/ neck no acute findings. MRI brain also negative.  Uncooperative to obtain orthostatics  Acute minimally displaced right lateral seventh rib fracture. Healing distal left rib fractures.  fall precautions  Uncooperative with PT evaluation but was a code flight overnight and ambulating without any acute issues     Problem/Plan - 4:  ·  Problem: History of breast cancer.   ·  Plan: MRI brain  negative for mets  -outpatient hem/onc follow up to complete course of treatment     Problem/Plan - 5:  ·  Problem: HTN (hypertension).   ·  Plan: BP stable; continue lisinopril     Problem/Plan - 6:  ·  Problem: HFrEF; unclear if acute or chronic  -TTE with EF of 40-45% for which cardio was consulted  -elevated inflammatory markers and [facility] was concerned for possible clozapine inducted myocarditis  -no evidence of decompensated heart failure  -TNI negative and non ischemic EKG changes  -seen by cardiology on 11/26 and recommended outpatient follow up with primary cardiology     Problem/Plan - 7:  ·  Problem: Mild Hyponatremia possibly due to polydipsia vs SIADH - resolved  -appears euvolemic  -urine studies reviewed  -continue salt tabs and free water restriction     Problem/Plan - 8:  ·  Problem: DAVID likely due to prerenal azotemia - resolved  -Creatinine normalized  -UA bland  -never required IVF because refused an IV  -good PO intake  -no further labs    DVT ppx - Heparin SC    Dispo - Patient is medically stable for discharge to [facility]; awaiting bed.    Plan discussed with Dr. Josep RN, SW

## 2022-12-01 ENCOUNTER — TRANSCRIPTION ENCOUNTER (OUTPATIENT)
Age: 52
End: 2022-12-01

## 2022-12-01 PROCEDURE — 99233 SBSQ HOSP IP/OBS HIGH 50: CPT

## 2022-12-01 RX ADMIN — SODIUM CHLORIDE 1 GRAM(S): 9 INJECTION INTRAMUSCULAR; INTRAVENOUS; SUBCUTANEOUS at 05:40

## 2022-12-01 RX ADMIN — HALOPERIDOL DECANOATE 5 MILLIGRAM(S): 100 INJECTION INTRAMUSCULAR at 09:21

## 2022-12-01 RX ADMIN — HALOPERIDOL DECANOATE 10 MILLIGRAM(S): 100 INJECTION INTRAMUSCULAR at 22:02

## 2022-12-01 RX ADMIN — Medication 1 MILLIGRAM(S): at 13:56

## 2022-12-01 RX ADMIN — PANTOPRAZOLE SODIUM 40 MILLIGRAM(S): 20 TABLET, DELAYED RELEASE ORAL at 05:41

## 2022-12-01 RX ADMIN — Medication 2 MILLIGRAM(S): at 01:04

## 2022-12-01 RX ADMIN — Medication 0.5 MILLIGRAM(S): at 17:43

## 2022-12-01 RX ADMIN — SODIUM CHLORIDE 1 GRAM(S): 9 INJECTION INTRAMUSCULAR; INTRAVENOUS; SUBCUTANEOUS at 13:56

## 2022-12-01 RX ADMIN — Medication 2000 UNIT(S): at 11:32

## 2022-12-01 RX ADMIN — Medication 1 MILLIGRAM(S): at 05:40

## 2022-12-01 RX ADMIN — HALOPERIDOL DECANOATE 5 MILLIGRAM(S): 100 INJECTION INTRAMUSCULAR at 01:17

## 2022-12-01 RX ADMIN — SODIUM CHLORIDE 1 GRAM(S): 9 INJECTION INTRAMUSCULAR; INTRAVENOUS; SUBCUTANEOUS at 22:02

## 2022-12-01 RX ADMIN — Medication 325 MILLIGRAM(S): at 11:32

## 2022-12-01 RX ADMIN — Medication 0.5 MILLIGRAM(S): at 05:41

## 2022-12-01 RX ADMIN — SENNA PLUS 2 TABLET(S): 8.6 TABLET ORAL at 22:03

## 2022-12-01 RX ADMIN — Medication 1 MILLIGRAM(S): at 22:02

## 2022-12-01 RX ADMIN — POLYETHYLENE GLYCOL 3350 17 GRAM(S): 17 POWDER, FOR SOLUTION ORAL at 11:33

## 2022-12-01 RX ADMIN — LISINOPRIL 2.5 MILLIGRAM(S): 2.5 TABLET ORAL at 05:41

## 2022-12-01 RX ADMIN — HEPARIN SODIUM 5000 UNIT(S): 5000 INJECTION INTRAVENOUS; SUBCUTANEOUS at 17:44

## 2022-12-01 RX ADMIN — Medication 500 MILLIGRAM(S): at 11:32

## 2022-12-01 RX ADMIN — HEPARIN SODIUM 5000 UNIT(S): 5000 INJECTION INTRAVENOUS; SUBCUTANEOUS at 05:42

## 2022-12-01 NOTE — DISCHARGE NOTE PROVIDER - CARE PROVIDERS DIRECT ADDRESSES
,[email or web address],DirectAddress_Unknown,DirectAddress_Unknown,DirectAddress_Unknown

## 2022-12-01 NOTE — DISCHARGE NOTE PROVIDER - CARE PROVIDER_API CALL
John Bailon)  Cardiology; Cardiovascular Disease; Internal Medicine  [address] [place] [address]  Phone: [phone]  Fax: [phone]  Follow Up Time: 1 week    PCP,   Phone: (   )    -  Fax: (   )    -  Follow Up Time: 1 week    Psychiatrist,   Phone: (   )    -  Fax: (   )    -  Follow Up Time: 1 week    Heme/ Onc,   Phone: (   )    -  Fax: (   )    -  Follow Up Time: 1 week

## 2022-12-01 NOTE — DISCHARGE NOTE PROVIDER - NSDCMRMEDTOKEN_GEN_ALL_CORE_FT
ascorbic acid 500 mg oral tablet: 1 tab(s) orally 2 times a day  Ativan 1 mg oral tablet: 1 tab(s) orally 3 times a day  Ativan 2 mg oral tablet: 1 tab(s) orally every 6 hours, As Needed  cholecalciferol oral tablet: 2000 unit(s) orally once a day  Desyrel 50 mg oral tablet: orally once a day (at bedtime), As Needed  Feosol 325 mg (65 mg elemental iron) oral tablet: 1 tab(s) orally once a day  Haldol 5 mg oral tablet: 1 tab(s) orally once a day at 9 am  Haldol 5 mg oral tablet: 1 tab(s) orally every 6 hours, As Needed  Motrin 400 mg oral tablet: 1 tab(s) orally every 4 hours, As Needed  Prinivil 2.5 mg oral tablet: 1 tab(s) orally once a day  Protonix 40 mg oral delayed release tablet: 1 tab(s) orally once a day  Robitussin 100 mg/5 mL oral liquid: 10 milliliter(s) orally every 6 hours, As Needed  Tylenol 325 mg oral tablet: 2 tab(s) orally every 4 hours, As Needed   ascorbic acid 500 mg oral tablet: 1 tab(s) orally once a day  benztropine 0.5 mg oral tablet: 1 tab(s) orally every 12 hours  cholecalciferol oral tablet: 2000 unit(s) orally once a day  Feosol 325 mg (65 mg elemental iron) oral tablet: 1 tab(s) orally once a day  haloperidol 10 mg oral tablet: 1 tab(s) orally 2 times a day  haloperidol 5 mg oral tablet: 1 tab(s) orally every 6 hours, As needed, agitation  LORazepam 1 mg oral tablet: 1 tab(s) orally 3 times a day  pantoprazole 40 mg oral delayed release tablet: 1 tab(s) orally once a day (before a meal)  polyethylene glycol 3350 oral powder for reconstitution: 17 gram(s) orally once a day  Prinivil 2.5 mg oral tablet: 1 tab(s) orally once a day  senna leaf extract oral tablet: 2 tab(s) orally once a day (at bedtime)  sodium chloride 1 g oral tablet: 1 tab(s) orally 3 times a day

## 2022-12-01 NOTE — DISCHARGE NOTE PROVIDER - NSDCCPCAREPLAN_GEN_ALL_CORE_FT
PRINCIPAL DISCHARGE DIAGNOSIS  Diagnosis: Altered mental state  Assessment and Plan of Treatment: - Improved  - Work- up negative; likely secondary to underlying psych illnesses  - Follow up with PCP in 1 week      SECONDARY DISCHARGE DIAGNOSES  Diagnosis: Disorganized behavior  Assessment and Plan of Treatment: - Conitnue on current medication regimen   - Follow up with Psychiatry and PCP in 1 week       Diagnosis: Personal history of breast cancer  Assessment and Plan of Treatment: - Outpatient hem/onc follow up to complete course of treatment in 1 week    Diagnosis: HTN (hypertension)  Assessment and Plan of Treatment: - Continue current medication regimen  - Follow up with PCP in 1 week    Diagnosis: Congestive heart failure (CHF)  Assessment and Plan of Treatment: -TTE with EF of 40-45% for which cardio was consulted  - Follow up with Cardiology in 1 week     PRINCIPAL DISCHARGE DIAGNOSIS  Diagnosis: Altered mental state  Assessment and Plan of Treatment: - Improved  - Work- up negative; likely secondary to underlying psych illnesses  - Follow up with PCP in 1 week      SECONDARY DISCHARGE DIAGNOSES  Diagnosis: Disorganized behavior  Assessment and Plan of Treatment: - Conitnue on current medication regimen   - Follow up with Psychiatry and PCP in 1 week       Diagnosis: Personal history of breast cancer  Assessment and Plan of Treatment: - Outpatient hem/onc follow up to complete course of treatment in 1 week    Diagnosis: HTN (hypertension)  Assessment and Plan of Treatment: - Continue current medication regimen  - Follow up with PCP in 1 week    Diagnosis: Congestive heart failure (CHF)  Assessment and Plan of Treatment: -TTE with EF of 40-45% for which cardio was consulted  - Follow up with Cardiology in 1 week    Diagnosis: Hyponatremia  Assessment and Plan of Treatment:

## 2022-12-01 NOTE — DISCHARGE NOTE PROVIDER - PROVIDER TOKENS
PROVIDER:[TOKEN:[[id number]],FOLLOWUP:[1 week]],FREE:[LAST:[PCP],PHONE:[(   )    -],FAX:[(   )    -],FOLLOWUP:[1 week]],FREE:[LAST:[Psychiatrist],PHONE:[(   )    -],FAX:[(   )    -],FOLLOWUP:[1 week]],FREE:[LAST:[Heme/ Onc],PHONE:[(   )    -],FAX:[(   )    -],FOLLOWUP:[1 week]]

## 2022-12-01 NOTE — DISCHARGE NOTE PROVIDER - ATTENDING DISCHARGE PHYSICAL EXAMINATION:
Limited physical exam due to patient is unco-operative and abusive   CONSTITUTIONAL: NAD, well-developed, comfortable in bed, awake  RESPIRATORY: Normal respiratory effort;   MUSCLOSKELETAL:  No clubbing or cyanosis of digits  PSYCH:  +delusional, abusive, disorganized speech   NEUROLOGY: CN 2-12 are intact and symmetric; moving all extremities    SKIN: No rashes

## 2022-12-01 NOTE — PROGRESS NOTE ADULT - SUBJECTIVE AND OBJECTIVE BOX
CHIEF COMPLAINT/INTERVAL HISTORY:    Patient is a 52y old  Male who presents with a chief complaint of Disorganized behaviour / confusion / fall (30 Nov 2022 15:24)    SUBJECTIVE & OBJECTIVE: Pt seen and examined at bedside. Received Haldol overnight. Muttering to self; not answering questions. Able to take PO meds.    ROS: Unobtainable     ICU Vital Signs Last 24 Hrs  T(C): 36.7 (01 Dec 2022 09:44), Max: 36.9 (30 Nov 2022 20:57)  T(F): 98 (01 Dec 2022 09:44), Max: 98.5 (30 Nov 2022 20:57)  HR: 62 (01 Dec 2022 09:44) (62 - 101)  BP: 110/67 (01 Dec 2022 09:44) (110/67 - 148/84)  RR: 18 (01 Dec 2022 09:44) (18 - 18)  SpO2: 98% (01 Dec 2022 09:44) (96% - 98%)    O2 Parameters below as of 01 Dec 2022 09:44  Patient On (Oxygen Delivery Method): room air    MEDICATIONS  (STANDING):  ascorbic acid 500 milliGRAM(s) Oral daily  benztropine 0.5 milliGRAM(s) Oral every 12 hours  cholecalciferol 2000 Unit(s) Oral daily  ferrous    sulfate 325 milliGRAM(s) Oral daily  haloperidol     Tablet 5 milliGRAM(s) Oral <User Schedule>  haloperidol     Tablet 10 milliGRAM(s) Oral <User Schedule>  heparin   Injectable 5000 Unit(s) SubCutaneous every 12 hours  lisinopril 2.5 milliGRAM(s) Oral daily  LORazepam     Tablet 1 milliGRAM(s) Oral three times a day  pantoprazole    Tablet 40 milliGRAM(s) Oral before breakfast  polyethylene glycol 3350 17 Gram(s) Oral daily  senna 2 Tablet(s) Oral at bedtime  sodium chloride 1 Gram(s) Oral three times a day  sodium chloride 0.9%. 1000 milliLiter(s) (100 mL/Hr) IV Continuous <Continuous>    MEDICATIONS  (PRN):  acetaminophen     Tablet .. 650 milliGRAM(s) Oral every 6 hours PRN Mild Pain (1 - 3), Moderate Pain (4 - 6)  guaiFENesin Oral Liquid (Sugar-Free) 200 milliGRAM(s) Oral every 6 hours PRN Cough  haloperidol     Tablet 5 milliGRAM(s) Oral every 6 hours PRN agitation  haloperidol    Injectable 5 milliGRAM(s) IntraMuscular every 6 hours PRN Agitation  LORazepam   Injectable 2 milliGRAM(s) IntraMuscular every 6 hours PRN Agitation      LABS:                        11.7   9.13  )-----------( 328      ( 30 Nov 2022 10:36 )             32.0     11-30    139  |  102  |  23.8<H>  ----------------------------<  87  4.6   |  24.0  |  0.94    Ca    9.5      30 Nov 2022 10:36  Phos  3.8     11-30  Mg     2.1     11-30    PHYSICAL EXAM:    GENERAL: middle aged male, laying in bed, mumbling vulgar language  HEAD:  Atraumatic, Normocephalic  EYES: EOMI, PERRLA, conjunctiva and sclera clear  ENMT: Moist mucous membranes  NECK: Supple   CHEST/LUNG: Clear to auscultation bilaterally   HEART: Regular rate and rhythm; + S1/S2  ABDOMEN: Soft, Nontender, Nondistended; Bowel sounds present  EXTREMITIES:  no pedal edema

## 2022-12-01 NOTE — PROGRESS NOTE ADULT - ASSESSMENT
Patient is a 52 year old male with PMH of breast CA in remission, lymphoma, HTN, treatment-resistant schizoaffective disorder depressive type, sent in after a fall and recently with worsening disorganized behaviour.      Problem/Plan - 1:  ·  Problem: Disorganized behavior; history of Schizoaffective disorder  ·  Plan: agitated overnight s/p IM haldol   -on constant observation and off restraints  -continue haldol BID  -continue ativan 1 mg TID  -continue cogentin  -PRN IM Haldol and Ativan together if agitated  -eventual return to [facility] once bed is available     Problem/Plan - 2:  ·  Problem: Confusion likely due to worsening underlying psych disease  ·  Plan: mumbling to himself  CTH and MRI negative.  B12 low/normal - continue injections as ordered  TSH and ammonia level negative  UA negative     Problem/Plan - 3:  ·  Problem: Frequent Falls   ·  Plan:  Unclear if this is intentional due to psychological illness given negative imaging    patient able to ambulate without assistance when he desires  CT head/ neck no acute findings. MRI brain also negative.  Acute minimally displaced right lateral seventh rib fracture. Healing distal left rib fractures. Denies pain.   fall precautions  Uncooperative with PT evaluation but was a code flight overnight and ambulating without any acute issues     Problem/Plan - 4:  ·  Problem: History of breast cancer.   ·  Plan: MRI brain  negative for mets  -outpatient hem/onc follow up to complete course of treatment     Problem/Plan - 5:  ·  Problem: HTN (hypertension).   ·  Plan: BP stable; continue lisinopril     Problem/Plan - 6:  ·  Problem: HFrEF; unclear if acute or chronic  -TTE with EF of 40-45% for which cardio was consulted  -elevated inflammatory markers and [facility] was concerned for possible clozapine inducted myocarditis  -no evidence of decompensated heart failure  -TNI negative and non ischemic EKG changes  -seen by cardiology on 11/26 and recommended outpatient follow up with primary cardiology     Problem/Plan - 7:  ·  Problem: Mild Hyponatremia possibly due to polydipsia vs SIADH - resolved  -appears euvolemic  -urine studies reviewed  -continue salt tabs and free water restriction     Problem/Plan - 8:  ·  Problem: DAVID likely due to prerenal azotemia - resolved  -Creatinine normalized  -UA bland  -never required IVF because refused an IV  -good PO intake  -no further labs    DVT ppx - Heparin SC    Dispo - Patient is medically stable for discharge to [facility] once bed is available.    Plan discussed with Dr. Henry, RN, SW

## 2022-12-01 NOTE — DISCHARGE NOTE PROVIDER - HOSPITAL COURSE
Patient is a 52 year old male with PMH of breast CA in remission, lymphoma, HTN, treatment-resistant schizoaffective disorder depressive type, sent in after a fall and recently with worsening disorganized behaviour. Psychiatry was consulted and medications were adjusted . Pt's confusion most likely 2/2 underlying psych issues. CTH and MRI head all WNL. UA negative. Labs WNL. Pt has a hx of frequent falls, unclear if this is intentional d/t psychological illness - imaging negative. Pt noted to have DAVID and hyponatremia while hospitalized. Pt was initiated on salt tabs and free water restriction and has since improved. Pt is now medically stable for discharge with appropriate outpatient follow up.    All electrolyte abnormalities were monitored carefully and repleted as necessary during this hospitalization. At the time of discharge patient was hemodynamically stable and amenable to all terms of discharge.     Patient is a 52 year old male with PMH of breast CA in remission, lymphoma, HTN, treatment-resistant schizoaffective disorder depressive type, sent in after a fall and recently with worsening disorganized behaviour. Psychiatry was consulted and medications were adjusted . Pt's confusion most likely 2/2 underlying psych issues. CTH and MRI head all WNL. UA negative. Labs WNL. Pt has a hx of frequent falls, unclear if this is intentional d/t psychological illness - imaging negative. Pt noted to have DAVID and hyponatremia while hospitalized. Pt was initiated on salt tabs and free water restriction and has since improved. Pt is now medically stable for discharge with appropriate outpatient follow up.    All electrolyte abnormalities were monitored carefully and repleted as necessary during this hospitalization. At the time of discharge patient was hemodynamically stable . psychiatry recommended inpatient psych admission, this was arranged. wife was updated over the phone.

## 2022-12-02 LAB
ALBUMIN SERPL ELPH-MCNC: 3.5 G/DL — SIGNIFICANT CHANGE UP (ref 3.3–5.2)
ALP SERPL-CCNC: 102 U/L — SIGNIFICANT CHANGE UP (ref 40–120)
ALT FLD-CCNC: 17 U/L — SIGNIFICANT CHANGE UP
ANION GAP SERPL CALC-SCNC: 13 MMOL/L — SIGNIFICANT CHANGE UP (ref 5–17)
AST SERPL-CCNC: 37 U/L — SIGNIFICANT CHANGE UP
BILIRUB SERPL-MCNC: 0.4 MG/DL — SIGNIFICANT CHANGE UP (ref 0.4–2)
BUN SERPL-MCNC: 16.4 MG/DL — SIGNIFICANT CHANGE UP (ref 8–20)
CALCIUM SERPL-MCNC: 9.4 MG/DL — SIGNIFICANT CHANGE UP (ref 8.4–10.5)
CHLORIDE SERPL-SCNC: 99 MMOL/L — SIGNIFICANT CHANGE UP (ref 96–108)
CO2 SERPL-SCNC: 23 MMOL/L — SIGNIFICANT CHANGE UP (ref 22–29)
CREAT SERPL-MCNC: 0.86 MG/DL — SIGNIFICANT CHANGE UP (ref 0.5–1.3)
EGFR: 104 ML/MIN/1.73M2 — SIGNIFICANT CHANGE UP
GLUCOSE BLDC GLUCOMTR-MCNC: 109 MG/DL — HIGH (ref 70–99)
GLUCOSE SERPL-MCNC: 97 MG/DL — SIGNIFICANT CHANGE UP (ref 70–99)
HCT VFR BLD CALC: 34.4 % — LOW (ref 39–50)
HGB BLD-MCNC: 12.2 G/DL — LOW (ref 13–17)
MAGNESIUM SERPL-MCNC: 2 MG/DL — SIGNIFICANT CHANGE UP (ref 1.6–2.6)
MCHC RBC-ENTMCNC: 27.1 PG — SIGNIFICANT CHANGE UP (ref 27–34)
MCHC RBC-ENTMCNC: 35.5 GM/DL — SIGNIFICANT CHANGE UP (ref 32–36)
MCV RBC AUTO: 76.3 FL — LOW (ref 80–100)
PHOSPHATE SERPL-MCNC: 3.9 MG/DL — SIGNIFICANT CHANGE UP (ref 2.4–4.7)
PLATELET # BLD AUTO: 316 K/UL — SIGNIFICANT CHANGE UP (ref 150–400)
POTASSIUM SERPL-MCNC: 4.2 MMOL/L — SIGNIFICANT CHANGE UP (ref 3.5–5.3)
POTASSIUM SERPL-SCNC: 4.2 MMOL/L — SIGNIFICANT CHANGE UP (ref 3.5–5.3)
PROT SERPL-MCNC: 7.3 G/DL — SIGNIFICANT CHANGE UP (ref 6.6–8.7)
RBC # BLD: 4.51 M/UL — SIGNIFICANT CHANGE UP (ref 4.2–5.8)
RBC # FLD: 16 % — HIGH (ref 10.3–14.5)
SARS-COV-2 RNA SPEC QL NAA+PROBE: SIGNIFICANT CHANGE UP
SODIUM SERPL-SCNC: 135 MMOL/L — SIGNIFICANT CHANGE UP (ref 135–145)
WBC # BLD: 5.74 K/UL — SIGNIFICANT CHANGE UP (ref 3.8–10.5)
WBC # FLD AUTO: 5.74 K/UL — SIGNIFICANT CHANGE UP (ref 3.8–10.5)

## 2022-12-02 PROCEDURE — 99233 SBSQ HOSP IP/OBS HIGH 50: CPT

## 2022-12-02 RX ORDER — HALOPERIDOL DECANOATE 100 MG/ML
10 INJECTION INTRAMUSCULAR
Refills: 0 | Status: DISCONTINUED | OUTPATIENT
Start: 2022-12-02 | End: 2022-12-08

## 2022-12-02 RX ORDER — DIAZEPAM 5 MG
10 TABLET ORAL ONCE
Refills: 0 | Status: DISCONTINUED | OUTPATIENT
Start: 2022-12-02 | End: 2022-12-02

## 2022-12-02 RX ORDER — DIAZEPAM 5 MG
10 TABLET ORAL EVERY 6 HOURS
Refills: 0 | Status: DISCONTINUED | OUTPATIENT
Start: 2022-12-02 | End: 2022-12-02

## 2022-12-02 RX ORDER — HALOPERIDOL DECANOATE 100 MG/ML
5 INJECTION INTRAMUSCULAR ONCE
Refills: 0 | Status: COMPLETED | OUTPATIENT
Start: 2022-12-02 | End: 2022-12-02

## 2022-12-02 RX ORDER — DIPHENHYDRAMINE HCL 50 MG
50 CAPSULE ORAL ONCE
Refills: 0 | Status: COMPLETED | OUTPATIENT
Start: 2022-12-02 | End: 2022-12-02

## 2022-12-02 RX ORDER — DIAZEPAM 5 MG
10 TABLET ORAL EVERY 6 HOURS
Refills: 0 | Status: DISCONTINUED | OUTPATIENT
Start: 2022-12-02 | End: 2022-12-03

## 2022-12-02 RX ADMIN — Medication 10 MILLIGRAM(S): at 05:15

## 2022-12-02 RX ADMIN — HALOPERIDOL DECANOATE 5 MILLIGRAM(S): 100 INJECTION INTRAMUSCULAR at 02:35

## 2022-12-02 RX ADMIN — Medication 1 MILLIGRAM(S): at 15:34

## 2022-12-02 RX ADMIN — SODIUM CHLORIDE 1 GRAM(S): 9 INJECTION INTRAMUSCULAR; INTRAVENOUS; SUBCUTANEOUS at 15:35

## 2022-12-02 RX ADMIN — SODIUM CHLORIDE 1 GRAM(S): 9 INJECTION INTRAMUSCULAR; INTRAVENOUS; SUBCUTANEOUS at 10:40

## 2022-12-02 RX ADMIN — Medication 325 MILLIGRAM(S): at 12:31

## 2022-12-02 RX ADMIN — Medication 0.5 MILLIGRAM(S): at 11:27

## 2022-12-02 RX ADMIN — PANTOPRAZOLE SODIUM 40 MILLIGRAM(S): 20 TABLET, DELAYED RELEASE ORAL at 12:40

## 2022-12-02 RX ADMIN — Medication 10 MILLIGRAM(S): at 03:22

## 2022-12-02 RX ADMIN — LISINOPRIL 2.5 MILLIGRAM(S): 2.5 TABLET ORAL at 11:27

## 2022-12-02 RX ADMIN — HALOPERIDOL DECANOATE 5 MILLIGRAM(S): 100 INJECTION INTRAMUSCULAR at 11:27

## 2022-12-02 RX ADMIN — HEPARIN SODIUM 5000 UNIT(S): 5000 INJECTION INTRAVENOUS; SUBCUTANEOUS at 18:28

## 2022-12-02 RX ADMIN — Medication 2000 UNIT(S): at 12:31

## 2022-12-02 RX ADMIN — Medication 500 MILLIGRAM(S): at 12:31

## 2022-12-02 RX ADMIN — POLYETHYLENE GLYCOL 3350 17 GRAM(S): 17 POWDER, FOR SOLUTION ORAL at 18:28

## 2022-12-02 RX ADMIN — Medication 0.5 MILLIGRAM(S): at 18:28

## 2022-12-02 RX ADMIN — Medication 10 MILLIGRAM(S): at 12:31

## 2022-12-02 RX ADMIN — Medication 10 MILLIGRAM(S): at 02:47

## 2022-12-02 RX ADMIN — HEPARIN SODIUM 5000 UNIT(S): 5000 INJECTION INTRAVENOUS; SUBCUTANEOUS at 11:27

## 2022-12-02 RX ADMIN — Medication 50 MILLIGRAM(S): at 02:35

## 2022-12-02 RX ADMIN — Medication 2 MILLIGRAM(S): at 02:19

## 2022-12-02 RX ADMIN — HALOPERIDOL DECANOATE 5 MILLIGRAM(S): 100 INJECTION INTRAMUSCULAR at 02:19

## 2022-12-02 NOTE — PROGRESS NOTE ADULT - SUBJECTIVE AND OBJECTIVE BOX
[facility] Division of Hospital Medicine    SUBJECTIVE / OVERNIGHT EVENTS:  Transferred to SD on 3E overnight gor agitation and increased sedative reqs  Was heavily sedated overnight but now awake.  Believes he is a prophet    Patient denies chest pain, SOB, abd pain, N/V, fever, chills, dysuria or any other complaints. All remainder ROS negative.     MEDICATIONS  (STANDING):  ascorbic acid 500 milliGRAM(s) Oral daily  benztropine 0.5 milliGRAM(s) Oral every 12 hours  cholecalciferol 2000 Unit(s) Oral daily  diazepam  Injectable 10 milliGRAM(s) IV Push every 6 hours  ferrous    sulfate 325 milliGRAM(s) Oral daily  haloperidol     Tablet 5 milliGRAM(s) Oral <User Schedule>  haloperidol     Tablet 10 milliGRAM(s) Oral <User Schedule>  heparin   Injectable 5000 Unit(s) SubCutaneous every 12 hours  lisinopril 2.5 milliGRAM(s) Oral daily  LORazepam     Tablet 1 milliGRAM(s) Oral three times a day  pantoprazole    Tablet 40 milliGRAM(s) Oral before breakfast  polyethylene glycol 3350 17 Gram(s) Oral daily  senna 2 Tablet(s) Oral at bedtime  sodium chloride 1 Gram(s) Oral three times a day  sodium chloride 0.9%. 1000 milliLiter(s) (100 mL/Hr) IV Continuous <Continuous>    MEDICATIONS  (PRN):  acetaminophen     Tablet .. 650 milliGRAM(s) Oral every 6 hours PRN Mild Pain (1 - 3), Moderate Pain (4 - 6)  guaiFENesin Oral Liquid (Sugar-Free) 200 milliGRAM(s) Oral every 6 hours PRN Cough  haloperidol     Tablet 5 milliGRAM(s) Oral every 6 hours PRN agitation  haloperidol    Injectable 5 milliGRAM(s) IntraMuscular every 6 hours PRN Agitation  LORazepam   Injectable 2 milliGRAM(s) IntraMuscular every 6 hours PRN Agitation        I&O's Summary      PHYSICAL EXAM:  Vital Signs Last 24 Hrs  T(C): 36.4 (02 Dec 2022 07:22), Max: 37 (01 Dec 2022 20:30)  T(F): 97.5 (02 Dec 2022 07:22), Max: 98.6 (01 Dec 2022 20:30)  HR: 87 (02 Dec 2022 09:30) (84 - 109)  BP: 135/94 (02 Dec 2022 09:30) (114/70 - 135/94)  BP(mean): 107 (02 Dec 2022 09:30) (107 - 107)  RR: 14 (02 Dec 2022 09:30) (14 - 26)  SpO2: 100% (02 Dec 2022 09:30) (96% - 100%)    Parameters below as of 02 Dec 2022 09:30  Patient On (Oxygen Delivery Method): room air            CONSTITUTIONAL: NAD, well-developed  ENMT: Moist oral mucosa, no pharyngeal injection or exudates; normal dentition  RESPIRATORY: Normal respiratory effort; lungs are clear to auscultation bilaterally  CARDIOVASCULAR: Regular rate and rhythm, normal S1 and S2, no murmur/rub/gallop; Peripheral pulses are 2+ bilaterally  ABDOMEN: Nontender to palpation, normoactive bowel sounds, no rebound/guarding;   MUSCLOSKELETAL:  No clubbing or cyanosis of digits; no joint swelling or tenderness to palpation  PSYCH: A+O to person, place, and time;  +delusion  NEUROLOGY: CN 2-12 are intact and symmetric; no gross sensory deficits;   SKIN: No rashes; no palpable lesions    LABS:                        12.2   5.74  )-----------( 316      ( 02 Dec 2022 06:00 )             34.4     12-02    135  |  99  |  16.4  ----------------------------<  97  4.2   |  23.0  |  0.86    Ca    9.4      02 Dec 2022 06:00  Phos  3.9     12-02  Mg     2.0     12-02    TPro  7.3  /  Alb  3.5  /  TBili  0.4  /  DBili  x   /  AST  37  /  ALT  17  /  AlkPhos  102  12-02              CAPILLARY BLOOD GLUCOSE      POCT Blood Glucose.: 109 mg/dL (02 Dec 2022 02:46)        RADIOLOGY & ADDITIONAL TESTS:  Results Reviewed:   Imaging Personally Reviewed:  Electrocardiogram Personally Reviewed:

## 2022-12-02 NOTE — CONSULT NOTE ADULT - ASSESSMENT
51 y/o male pmhx breast cancer in remission/ s/p chemo, lymphoma, HTN and treatement resistant schizoaffective disorder admitted s/p fall and worsening disorganized behavior.    Plan  - Add [id number] q6hr standing.  Valium PRN for severe agitation  - C/w RTC Haldol and Ativan as ordered. PRN Haldol and ativan as well.   - All previous imaging of the head has been negative, no focal deficits on exam  -  following, f/u recommendations   - Maintain 1:1 observation.     Does not require ICU at this time.   Transfer to SDU for closer monitoring, while awaiting bed at [facility]    Discussed w/ Dr. Maguire

## 2022-12-02 NOTE — DIETITIAN INITIAL EVALUATION ADULT - PERTINENT LABORATORY DATA
12-02    135  |  99  |  16.4  ----------------------------<  97  4.2   |  23.0  |  0.86    Ca    9.4      02 Dec 2022 06:00  Phos  3.9     12-02  Mg     2.0     12-02    TPro  7.3  /  Alb  3.5  /  TBili  0.4  /  DBili  x   /  AST  37  /  ALT  17  /  AlkPhos  102  12-02  POCT Blood Glucose.: 109 mg/dL (12-02-22 @ 02:46)  A1C with Estimated Average Glucose Result: 4.8 % (09-12-22 @ 06:00)

## 2022-12-02 NOTE — CONSULT NOTE ADULT - SUBJECTIVE AND OBJECTIVE BOX
Patient is a 52y old  Male who presents with a chief complaint of Disorganized behaviour / confusion / fall (01 Dec 2022 14:10)     51 y/o male pmhx breast cancer in remission/ s/p chemo, lymphoma, HTN and treatement resistant schizoaffective disorder admitted s/p fall and worsening disorganized behavior.  Trauma work up was negative. Worsening confusion was been thought to be due to progressive underlying psych disease. CT head/ MRI negative. Seen by , started on PO haldol and Ativan RTC. During hospital staty patient multiple code greys for agitation and attempting to leave requiring IM Haldol/ Ativan. He is awaiting placement at [facility], medically cleared for d/c    Tonight patient code grey for agitation and combativeness, givne IM Bendaryl 50mg/ 2mg IM Ativan and 5mg IM Haldol x2. ICU consulted.  Patient was given 10mg IV Valium once PIV assess was obtained. Patient mumbling to himself, still restless but less agitated.        PAST MEDICAL & SURGICAL HISTORY:  Lymphoma      Breast cancer      Major depression  schizoaffective disorder ?      No significant past surgical history        Allergies    No Known Allergies    Intolerances      FAMILY HISTORY:  Patient unable to provide medical history  pt. not give goood hsitory very likely due to his current disorganized behaviour.        Review of Systems:  Unable to obtain due to clinical condition     Medications:  lisinopril 2.5 milliGRAM(s) Oral daily  guaiFENesin Oral Liquid (Sugar-Free) 200 milliGRAM(s) Oral every 6 hours PRN  acetaminophen     Tablet .. 650 milliGRAM(s) Oral every 6 hours PRN  benztropine 0.5 milliGRAM(s) Oral every 12 hours  diazepam  Injectable 10 milliGRAM(s) IV Push every 6 hours  haloperidol     Tablet 5 milliGRAM(s) Oral every 6 hours PRN  haloperidol     Tablet 5 milliGRAM(s) Oral <User Schedule>  haloperidol     Tablet 10 milliGRAM(s) Oral <User Schedule>  haloperidol    Injectable 5 milliGRAM(s) IntraMuscular every 6 hours PRN  LORazepam     Tablet 1 milliGRAM(s) Oral three times a day  LORazepam   Injectable 2 milliGRAM(s) IntraMuscular every 6 hours PRN  heparin   Injectable 5000 Unit(s) SubCutaneous every 12 hours  pantoprazole    Tablet 40 milliGRAM(s) Oral before breakfast  polyethylene glycol 3350 17 Gram(s) Oral daily  senna 2 Tablet(s) Oral at bedtime  ascorbic acid 500 milliGRAM(s) Oral daily  cholecalciferol 2000 Unit(s) Oral daily  ferrous    sulfate 325 milliGRAM(s) Oral daily  sodium chloride 1 Gram(s) Oral three times a day  sodium chloride 0.9%. 1000 milliLiter(s) IV Continuous <Continuous>                ICU Vital Signs Last 24 Hrs  T(C): 36.7 (02 Dec 2022 02:48), Max: 37 (01 Dec 2022 20:30)  T(F): 98 (02 Dec 2022 02:48), Max: 98.6 (01 Dec 2022 20:30)  HR: 93 (02 Dec 2022 03:30) (62 - 109)  BP: 114/70 (02 Dec 2022 02:48) (110/67 - 116/75)  RR: 17 (02 Dec 2022 03:30) (17 - 19)  SpO2: 100% (02 Dec 2022 03:30) (96% - 100%)    O2 Parameters below as of 01 Dec 2022 20:30  Patient On (Oxygen Delivery Method): room air          Vital Signs Last 24 Hrs  T(C): 36.7 (02 Dec 2022 02:48), Max: 37 (01 Dec 2022 20:30)  T(F): 98 (02 Dec 2022 02:48), Max: 98.6 (01 Dec 2022 20:30)  HR: 93 (02 Dec 2022 03:30) (62 - 109)  BP: 114/70 (02 Dec 2022 02:48) (110/67 - 116/75)  BP(mean): --  RR: 17 (02 Dec 2022 03:30) (17 - 19)  SpO2: 100% (02 Dec 2022 03:30) (96% - 100%)    Parameters below as of 01 Dec 2022 20:30  Patient On (Oxygen Delivery Method): room air            I&O's Detail        LABS:                        11.7   9.13  )-----------( 328      ( 30 Nov 2022 10:36 )             32.0     11-30    139  |  102  |  23.8<H>  ----------------------------<  87  4.6   |  24.0  |  0.94    Ca    9.5      30 Nov 2022 10:36  Phos  3.8     11-30  Mg     2.1     11-30            CAPILLARY BLOOD GLUCOSE      POCT Blood Glucose.: 109 mg/dL (02 Dec 2022 02:46)        CULTURES:      Physical Examination:    General: Restless. Agitated     HEENT: Pupils equal, reactive to light.  Symmetric.    PULM: Clear to auscultation bilaterally, no significant sputum production    CVS: Regular rate and rhythm, no murmurs, rubs, or gallops    ABD: Soft, nondistended, nontender, normoactive bowel sounds, no masses    EXT: No edema, nontender    SKIN: Warm and well perfused, no rashes noted.    NEURO: No focal deficits.  Good strenght in all 4 extremities.     RADIOLOGY: < from: MR Head w/wo IV Cont (11.26.22 @ 15:27) >  ACC: [id number] EXAM:  MR BRAIN WAW IC                          PROCEDURE DATE:  11/26/2022          INTERPRETATION:  CLINICAL INDICATION: Breast cancer, rule out   intracranial metastases    TECHNIQUE: Multi-planar, multi-sequence magnetic resonanceimaging of the   brain was performed with and without intravenous contrast.    CONTRAST: Post-contrast MR images were obtained following infusion of 9   mL of Gadavist; 1 mL were discarded    COMPARISON: Noncontrast head CT 11/25/2022    FINDINGS:  Motion limited study.    There is no acute infarct. There is no evidence of mass or intracranial   hemorrhage. There is no grossly abnormal enhancement within the brain.   Ventricles and sulci are prominent compatible with mild generalized brain   parenchymal volume loss. No midline shift or other significant mass   effect is noted. Gray-white differentiation is maintained throughout.   Normal flow voids are maintained in the dominant arterial and venous   structures.    The visualized paranasal sinuses and tympanomastoid spaces are clear.   Orbits and orbital contents are unremarkable.    IMPRESSION: Motion limited, no gross evidence for intracranial metastatic   disease.    --- End of Report ---      < end of copied text >

## 2022-12-02 NOTE — CONSULT NOTE ADULT - NS PANP COMMENT GEN_ALL_CORE FT
PCCM Attending Attestation:    Patient was seen and examined at the bedside. I was physically present for the key portions of the evaluation and management (E/M) service provided. Agree with history, physical exam, assessment, and plan as outlined by JOYCE note above, with the following additions and/or comments:    53 y/o M with hx of breast cancer (follow in [place], s/p 10 / 12 rounds of chemo), lymphoma, HTN, severe schizoaffective disorder, initially sent from [facility] for worsening psychotic symptoms on nov 25.  In addition to psychiatric symptoms, patient found to have decrease EF to 40-45% concern for chemo-related myocarditis.  Also work up for possible metastasis.  Patient s/p multiple episode of delirium in the hospital and s.p CT head on 11/25 showed no acute changes, and MRI brain on 11/26 which showed no metastasis.  CT chest show acute right 7th lateral rib minimal displaced fracture, which was then clear by surgery.   On 12/1 PM, code gray was called, patient was severely agitated, jumped out of bed and act violently. Patient s/p 5 mg of Haldol and 2 mg of Ativan IM without success.  MICU was called for assistance.   Upon examination, patient was held down by 4 security officers.  He was confused, mumbling words, and frequently try to resist.  /70, , RR 18, Temp 36.7C, . Pupil equal reactive,  lung and cardio sounds are normal.   Follows simple commands.    Labs: overall unremarkable.     #acute on chronic psychosis   #schizoaffective disorder  # HFrEF with eF 40 %  # history of Breast Ca, s/p left chemoport,   # history of lymphoma    -Given on-going severe agitation, can do standing valium 10 mg q6h, with PRN valium , please put holding parameter, hold if patient appears sedated or SBP < 90   -Continue 1:1  -Continue Haldol PRN   - hold PO medication due to aspiration risk  - since patient follows commands, neuro exam non-focal, no witnessed fall, and multiple ct heads in the past, will hold off further imaging study now  - psy following  - Can transfer to stepdown for closer monitoring       Zurdo Maguire M.D  Attending Physician  [facility]   Pulmonary & Critical Care Medicine

## 2022-12-02 NOTE — PROGRESS NOTE ADULT - ASSESSMENT
Patient is a 52 year old male with PMH of breast CA in remission, lymphoma, HTN, treatment-resistant schizoaffective disorder depressive type, sent in after a fall and recently with worsening disorganized behaviour.   Was planned for transfer back to  but became agitated and transferred to SD.    Disorganized behavior; history of Schizoaffective disorder  Agitation   -on constant observation   -continue haldol BID  -continue ativan 1 mg TID  -continue cogentin  -Valium IV prn   Psychiatry informed that DC held off and needs better oral med titration.  -eventual return to [facility] once bed is available    Confusion likely due to worsening underlying psych disease  CTH and MRI negative.  B12 low/normal - continue injections as ordered  TSH and ammonia level negative  UA negative    Frequent Falls   Unclear if this is intentional due to psychological illness given negative imaging    patient able to ambulate without assistance when he desires  CT head/ neck no acute findings. MRI brain also negative.  Acute minimally displaced right lateral seventh rib fracture. Healing distal left rib fractures. Denies pain.   fall precautions  Uncooperative with PT evaluation but was a code flight overnight and ambulating without any acute issues    History of breast cancer.   MRI brain  negative for mets  outpatient hem/onc follow up to complete course of treatment    HTN (hypertension).   BP stable; continue lisinopril    HFrEF -  unclear if acute or chronic  -TTE with EF of 40-45% for which cardio was consulted  -elevated inflammatory markers and [facility] was concerned for possible clozapine inducted myocarditis  -no evidence of decompensated heart failure  -TNI negative and non ischemic EKG changes  -seen by cardiology on 11/26 and recommended outpatient follow up with primary cardiology    Mild Hyponatremia possibly due to polydipsia vs SIADH - resolved  -appears euvolemic  -urine studies reviewed  -continue salt tabs and free water restriction    DAVID likely due to prerenal azotemia - resolved  -Creatinine normalized  -UA bland  -never required IVF because refused an IV  -good PO intake  -no further labs    DVT ppx - Heparin SC    Dispo -  Needs psych med titration   Plan discussed with Dr. Henry, RN, [facility] When stable Patient is a 52 year old male with PMH of breast CA in remission, lymphoma, HTN, treatment-resistant schizoaffective disorder depressive type, sent in after a fall and recently with worsening disorganized behaviour.   Was planned for transfer back to  but became agitated and transferred to SD.    Disorganized behavior; history of Schizoaffective disorder  Agitation   -on constant observation   -continue haldol BID  -continue ativan 1 mg TID  -continue cogentin  -Valium IV prn   Psychiatry informed that DC held off and needs better oral med titration.  -eventual return to [facility] once bed is available    Confusion likely due to worsening underlying psych disease  CTH and MRI negative.  B12 low/normal - continue injections as ordered  TSH and ammonia level negative  UA negative    Frequent Falls   Unclear if this is intentional due to psychological illness given negative imaging    patient able to ambulate without assistance when he desires  CT head/ neck no acute findings. MRI brain also negative.  Acute minimally displaced right lateral seventh rib fracture. Healing distal left rib fractures. Denies pain.   fall precautions  Uncooperative with PT evaluation but was a code flight overnight and ambulating without any acute issues    History of breast cancer.   MRI brain  negative for mets  outpatient hem/onc follow up to complete course of treatment    HTN (hypertension).   BP stable; continue lisinopril    HFrEF -  unclear if acute or chronic  -TTE with EF of 40-45% for which cardio was consulted  -elevated inflammatory markers and [facility] was concerned for possible clozapine inducted myocarditis  -no evidence of decompensated heart failure  -TNI negative and non ischemic EKG changes  -seen by cardiology on 11/26 and recommended outpatient follow up with primary cardiology    Mild Hyponatremia possibly due to polydipsia vs SIADH - resolved  -appears euvolemic  -urine studies reviewed  -continue salt tabs and free water restriction    DAVID likely due to prerenal azotemia - resolved  -Creatinine normalized  -UA bland  -never required IVF because refused an IV  -good PO intake  -no further labs    DVT ppx - Heparin SC    Dispo -  Needs psych med titration   Plan discussed with Dr. Henry, RN, [facility] when able  Medically cleared for discharge

## 2022-12-02 NOTE — DIETITIAN INITIAL EVALUATION ADULT - OTHER INFO
52 year old male with PMH of breast CA in remission, lymphoma, HTN, treatment-resistant schizoaffective disorder depressive type, sent in after a fall and recently with worsening disorganized behaviour. Pt was upgraded to ICU for agitation, now downgraded to SDU.    Pt currently on a DASH/TLC diet, tolerating well with good po intake per documentation. Pt with AMS, not appropriate for interview at this time. RD to follow up.

## 2022-12-02 NOTE — DIETITIAN INITIAL EVALUATION ADULT - NUTRITION CONSULT
Pharmacy Automatic Renal Dosing Protocol - Antimicrobials    Indication for Antimicrobials: bacteremia  Current Regimen of Each Antimicrobial (Start Day & Day of Therapy):  Levofloxacin 750 mg IV q24h, day #1 ()  Zosyn 3.375 gm q8h, day #1 ()      Significant Cultures: none at this time  CAPD, Hemodialysis or Renal Replacement Therapy: n/a  Paralysis, amputations, malnutrition: n/a  Recent Labs      07/15/17   0419  17   0413   CREA  0.94  0.81   BUN  9  10   WBC   --   9.4     Temp (24hrs), Av.9 °F (36.6 °C), Min:97.4 °F (36.3 °C), Max:99.4 °F (37.4 °C)    Creatinine Clearance (Creatinine Clearance (ml/min)): ~ 76 ml/min    Impression/Plan: Zosyn dose adjusted to 3.375 gm q8h per protocol       Pharmacy will follow daily and adjust medications as appropriate for renal function and/or serum levels.     Thank you,  Lilly Rutherford no

## 2022-12-02 NOTE — CHART NOTE - NSCHARTNOTEFT_GEN_A_CORE
Wali Crump called for episode agitation/aggression. Per RN pt pacing in room, praying at wall, when suddenly became aggressive toward 1:1 observer. Pulled sharps container from wall, throwing urinal.     4security guards responded to code, held pt down.   RN administered 2mg IM ativan and 5mg IM haldol as ordered by psychiatry.  Pt continuing to fight against security, muttering to self.   50mg IM benadryl given, still continuing to fight.  MICU consulted.   Additional 5mg IM haldol given at this time.   IV access established.   Pt still fighting against security.   10mg IV valium given.  Pt more calm at this time however still agitated, restless, attempting to get out of bed.     VITAL SIGNS:  T(C): 36.7 (12-02-22 @ 02:48), Max: 37 (12-01-22 @ 20:30)  T(F): 98 (12-02-22 @ 02:48), Max: 98.6 (12-01-22 @ 20:30)  HR: 109 (12-02-22 @ 02:48) (62 - 109)  BP: 114/70 (12-02-22 @ 02:48) (110/67 - 116/75)  RR: 19 (12-02-22 @ 02:48) (18 - 19)  SpO2: 96% (12-02-22 @ 02:48) (96% - 98%)  Wt(kg): --      PHYSICAL EXAM:  GENERAL: Restless, agitated  HEAD:  Atraumatic, Normocephalic  EYES: EOMI,  conjunctiva and sclera clear  ENT: Moist mucous membranes  CHEST/LUNG: Clear to auscultation bilaterally; No rales, rhonchi, wheezing, or rubs. Unlabored respirations  HEART: +S1, S2  MSK: FROM all 4 extremities, full and equal strength  SKIN: Warm, dry      A/P:  52 year old male with PMH of breast CA in remission, lymphoma, HTN, treatment-resistant schizoaffective disorder depressive type, sent in after a fall and recently with worsening disorganized behaviour.     Acute episode aggression; resolved  -MICU recs appreciated, will reconsult PRN  -CBC, CMP, Mag, Phos, UA in am  -10mg IV valium q6h  -vital signs q2h  -Continue on constant observation   -Will transfer to SDU for closer monitoring  -Updated wife Court [phone] Wali Crump called for episode agitation/aggression. Per RN pt pacing in room, praying at wall, when suddenly became aggressive toward 1:1 observer. Pulled sharps container from wall, throwing urinal.     4security guards responded to code, held pt down.   RN administered 2mg IM ativan and 5mg IM haldol as ordered by psychiatry.  Pt continuing to fight against security, muttering to self.   50mg IM benadryl given, still continuing to fight.  MICU consulted.   Additional 5mg IM haldol given at this time.   IV access established.   Pt still fighting against security.   10mg IV valium given.  Pt more calm at this time however still agitated, restless, attempting to get out of bed.     VITAL SIGNS:  T(C): 36.7 (12-02-22 @ 02:48), Max: 37 (12-01-22 @ 20:30)  T(F): 98 (12-02-22 @ 02:48), Max: 98.6 (12-01-22 @ 20:30)  HR: 109 (12-02-22 @ 02:48) (62 - 109)  BP: 114/70 (12-02-22 @ 02:48) (110/67 - 116/75)  RR: 19 (12-02-22 @ 02:48) (18 - 19)  SpO2: 96% (12-02-22 @ 02:48) (96% - 98%)  Wt(kg): --      PHYSICAL EXAM:  GENERAL: Restless, agitated  HEAD:  Atraumatic, Normocephalic  EYES: EOMI,  conjunctiva and sclera clear  ENT: Moist mucous membranes  CHEST/LUNG: Clear to auscultation bilaterally; No rales, rhonchi, wheezing, or rubs. Unlabored respirations  HEART: +S1, S2  MSK: FROM all 4 extremities, full and equal strength  SKIN: Warm, dry      A/P:  52 year old male with PMH of breast CA in remission, lymphoma, HTN, treatment-resistant schizoaffective disorder depressive type, sent in after a fall and recently with worsening disorganized behaviour.     Acute episode aggression  -MICU recs appreciated, will reconsult PRN  -CBC, CMP, Mag, Phos, UA in am  -10mg IV valium q6h  -vital signs q2h  -Continue on constant observation   -Will transfer to SDU for closer monitoring  -10mg IV valium x 1 dose given during transport for agitation   -Updated wife Court [phone]

## 2022-12-02 NOTE — DIETITIAN INITIAL EVALUATION ADULT - NS FNS DIET ORDER
Diet, DASH/TLC:   Sodium & Cholesterol Restricted  1500mL Fluid Restriction ([id number]) (11-27-22 @ 17:57)

## 2022-12-03 LAB
ALBUMIN SERPL ELPH-MCNC: 3.8 G/DL — SIGNIFICANT CHANGE UP (ref 3.3–5.2)
ALP SERPL-CCNC: 104 U/L — SIGNIFICANT CHANGE UP (ref 40–120)
ALT FLD-CCNC: 16 U/L — SIGNIFICANT CHANGE UP
ANION GAP SERPL CALC-SCNC: 11 MMOL/L — SIGNIFICANT CHANGE UP (ref 5–17)
ANION GAP SERPL CALC-SCNC: 13 MMOL/L — SIGNIFICANT CHANGE UP (ref 5–17)
APPEARANCE UR: CLEAR — SIGNIFICANT CHANGE UP
AST SERPL-CCNC: 33 U/L — SIGNIFICANT CHANGE UP
BASOPHILS # BLD AUTO: 0.03 K/UL — SIGNIFICANT CHANGE UP (ref 0–0.2)
BASOPHILS # BLD AUTO: 0.04 K/UL — SIGNIFICANT CHANGE UP (ref 0–0.2)
BASOPHILS NFR BLD AUTO: 0.5 % — SIGNIFICANT CHANGE UP (ref 0–2)
BASOPHILS NFR BLD AUTO: 0.6 % — SIGNIFICANT CHANGE UP (ref 0–2)
BILIRUB SERPL-MCNC: 0.4 MG/DL — SIGNIFICANT CHANGE UP (ref 0.4–2)
BILIRUB UR-MCNC: NEGATIVE — SIGNIFICANT CHANGE UP
BUN SERPL-MCNC: 17.5 MG/DL — SIGNIFICANT CHANGE UP (ref 8–20)
BUN SERPL-MCNC: 18.8 MG/DL — SIGNIFICANT CHANGE UP (ref 8–20)
CALCIUM SERPL-MCNC: 9.6 MG/DL — SIGNIFICANT CHANGE UP (ref 8.4–10.5)
CALCIUM SERPL-MCNC: 9.7 MG/DL — SIGNIFICANT CHANGE UP (ref 8.4–10.5)
CHLORIDE SERPL-SCNC: 102 MMOL/L — SIGNIFICANT CHANGE UP (ref 96–108)
CHLORIDE SERPL-SCNC: 99 MMOL/L — SIGNIFICANT CHANGE UP (ref 96–108)
CO2 SERPL-SCNC: 25 MMOL/L — SIGNIFICANT CHANGE UP (ref 22–29)
CO2 SERPL-SCNC: 25 MMOL/L — SIGNIFICANT CHANGE UP (ref 22–29)
COLOR SPEC: YELLOW — SIGNIFICANT CHANGE UP
CREAT SERPL-MCNC: 0.89 MG/DL — SIGNIFICANT CHANGE UP (ref 0.5–1.3)
CREAT SERPL-MCNC: 0.92 MG/DL — SIGNIFICANT CHANGE UP (ref 0.5–1.3)
DIFF PNL FLD: NEGATIVE — SIGNIFICANT CHANGE UP
EGFR: 102 ML/MIN/1.73M2 — SIGNIFICANT CHANGE UP
EGFR: 99 ML/MIN/1.73M2 — SIGNIFICANT CHANGE UP
EOSINOPHIL # BLD AUTO: 0.05 K/UL — SIGNIFICANT CHANGE UP (ref 0–0.5)
EOSINOPHIL # BLD AUTO: 0.08 K/UL — SIGNIFICANT CHANGE UP (ref 0–0.5)
EOSINOPHIL NFR BLD AUTO: 0.8 % — SIGNIFICANT CHANGE UP (ref 0–6)
EOSINOPHIL NFR BLD AUTO: 1.2 % — SIGNIFICANT CHANGE UP (ref 0–6)
GLUCOSE SERPL-MCNC: 85 MG/DL — SIGNIFICANT CHANGE UP (ref 70–99)
GLUCOSE SERPL-MCNC: 96 MG/DL — SIGNIFICANT CHANGE UP (ref 70–99)
GLUCOSE UR QL: NEGATIVE MG/DL — SIGNIFICANT CHANGE UP
HCT VFR BLD CALC: 34.6 % — LOW (ref 39–50)
HCT VFR BLD CALC: 35.1 % — LOW (ref 39–50)
HGB BLD-MCNC: 12.1 G/DL — LOW (ref 13–17)
HGB BLD-MCNC: 12.4 G/DL — LOW (ref 13–17)
IMM GRANULOCYTES NFR BLD AUTO: 0.3 % — SIGNIFICANT CHANGE UP (ref 0–0.9)
IMM GRANULOCYTES NFR BLD AUTO: 0.5 % — SIGNIFICANT CHANGE UP (ref 0–0.9)
INR BLD: 1.21 RATIO — HIGH (ref 0.88–1.16)
KETONES UR-MCNC: ABNORMAL
LACTATE BLDV-MCNC: 1.1 MMOL/L — SIGNIFICANT CHANGE UP (ref 0.5–2)
LEUKOCYTE ESTERASE UR-ACNC: NEGATIVE — SIGNIFICANT CHANGE UP
LYMPHOCYTES # BLD AUTO: 1.6 K/UL — SIGNIFICANT CHANGE UP (ref 1–3.3)
LYMPHOCYTES # BLD AUTO: 1.9 K/UL — SIGNIFICANT CHANGE UP (ref 1–3.3)
LYMPHOCYTES # BLD AUTO: 25.9 % — SIGNIFICANT CHANGE UP (ref 13–44)
LYMPHOCYTES # BLD AUTO: 29.4 % — SIGNIFICANT CHANGE UP (ref 13–44)
MAGNESIUM SERPL-MCNC: 2.1 MG/DL — SIGNIFICANT CHANGE UP (ref 1.6–2.6)
MCHC RBC-ENTMCNC: 27 PG — SIGNIFICANT CHANGE UP (ref 27–34)
MCHC RBC-ENTMCNC: 27.3 PG — SIGNIFICANT CHANGE UP (ref 27–34)
MCHC RBC-ENTMCNC: 35 GM/DL — SIGNIFICANT CHANGE UP (ref 32–36)
MCHC RBC-ENTMCNC: 35.3 GM/DL — SIGNIFICANT CHANGE UP (ref 32–36)
MCV RBC AUTO: 76.3 FL — LOW (ref 80–100)
MCV RBC AUTO: 78.1 FL — LOW (ref 80–100)
MONOCYTES # BLD AUTO: 0.47 K/UL — SIGNIFICANT CHANGE UP (ref 0–0.9)
MONOCYTES # BLD AUTO: 0.54 K/UL — SIGNIFICANT CHANGE UP (ref 0–0.9)
MONOCYTES NFR BLD AUTO: 7.6 % — SIGNIFICANT CHANGE UP (ref 2–14)
MONOCYTES NFR BLD AUTO: 8.3 % — SIGNIFICANT CHANGE UP (ref 2–14)
NEUTROPHILS # BLD AUTO: 3.9 K/UL — SIGNIFICANT CHANGE UP (ref 1.8–7.4)
NEUTROPHILS # BLD AUTO: 3.99 K/UL — SIGNIFICANT CHANGE UP (ref 1.8–7.4)
NEUTROPHILS NFR BLD AUTO: 60.3 % — SIGNIFICANT CHANGE UP (ref 43–77)
NEUTROPHILS NFR BLD AUTO: 64.6 % — SIGNIFICANT CHANGE UP (ref 43–77)
NITRITE UR-MCNC: NEGATIVE — SIGNIFICANT CHANGE UP
PH UR: 6 — SIGNIFICANT CHANGE UP (ref 5–8)
PHOSPHATE SERPL-MCNC: 4 MG/DL — SIGNIFICANT CHANGE UP (ref 2.4–4.7)
PLATELET # BLD AUTO: 347 K/UL — SIGNIFICANT CHANGE UP (ref 150–400)
PLATELET # BLD AUTO: 379 K/UL — SIGNIFICANT CHANGE UP (ref 150–400)
POTASSIUM SERPL-MCNC: 4.3 MMOL/L — SIGNIFICANT CHANGE UP (ref 3.5–5.3)
POTASSIUM SERPL-MCNC: 4.3 MMOL/L — SIGNIFICANT CHANGE UP (ref 3.5–5.3)
POTASSIUM SERPL-SCNC: 4.3 MMOL/L — SIGNIFICANT CHANGE UP (ref 3.5–5.3)
POTASSIUM SERPL-SCNC: 4.3 MMOL/L — SIGNIFICANT CHANGE UP (ref 3.5–5.3)
PROT SERPL-MCNC: 7.8 G/DL — SIGNIFICANT CHANGE UP (ref 6.6–8.7)
PROT UR-MCNC: NEGATIVE — SIGNIFICANT CHANGE UP
PROTHROM AB SERPL-ACNC: 14.1 SEC — HIGH (ref 10.5–13.4)
RBC # BLD: 4.43 M/UL — SIGNIFICANT CHANGE UP (ref 4.2–5.8)
RBC # BLD: 4.6 M/UL — SIGNIFICANT CHANGE UP (ref 4.2–5.8)
RBC # FLD: 16 % — HIGH (ref 10.3–14.5)
RBC # FLD: 16 % — HIGH (ref 10.3–14.5)
SODIUM SERPL-SCNC: 137 MMOL/L — SIGNIFICANT CHANGE UP (ref 135–145)
SODIUM SERPL-SCNC: 138 MMOL/L — SIGNIFICANT CHANGE UP (ref 135–145)
SP GR SPEC: 1.02 — SIGNIFICANT CHANGE UP (ref 1.01–1.02)
UROBILINOGEN FLD QL: 1 MG/DL
WBC # BLD: 6.18 K/UL — SIGNIFICANT CHANGE UP (ref 3.8–10.5)
WBC # BLD: 6.47 K/UL — SIGNIFICANT CHANGE UP (ref 3.8–10.5)
WBC # FLD AUTO: 6.18 K/UL — SIGNIFICANT CHANGE UP (ref 3.8–10.5)
WBC # FLD AUTO: 6.47 K/UL — SIGNIFICANT CHANGE UP (ref 3.8–10.5)

## 2022-12-03 PROCEDURE — 71045 X-RAY EXAM CHEST 1 VIEW: CPT | Mod: 26

## 2022-12-03 PROCEDURE — 93010 ELECTROCARDIOGRAM REPORT: CPT

## 2022-12-03 PROCEDURE — 99233 SBSQ HOSP IP/OBS HIGH 50: CPT

## 2022-12-03 RX ORDER — DIAZEPAM 5 MG
5 TABLET ORAL ONCE
Refills: 0 | Status: DISCONTINUED | OUTPATIENT
Start: 2022-12-03 | End: 2022-12-03

## 2022-12-03 RX ORDER — ACETAMINOPHEN 500 MG
650 TABLET ORAL ONCE
Refills: 0 | Status: COMPLETED | OUTPATIENT
Start: 2022-12-03 | End: 2022-12-03

## 2022-12-03 RX ORDER — SODIUM CHLORIDE 9 MG/ML
250 INJECTION INTRAMUSCULAR; INTRAVENOUS; SUBCUTANEOUS ONCE
Refills: 0 | Status: COMPLETED | OUTPATIENT
Start: 2022-12-03 | End: 2022-12-03

## 2022-12-03 RX ORDER — ACETAMINOPHEN 500 MG
650 TABLET ORAL ONCE
Refills: 0 | Status: DISCONTINUED | OUTPATIENT
Start: 2022-12-03 | End: 2022-12-03

## 2022-12-03 RX ORDER — HALOPERIDOL DECANOATE 100 MG/ML
5 INJECTION INTRAMUSCULAR ONCE
Refills: 0 | Status: COMPLETED | OUTPATIENT
Start: 2022-12-03 | End: 2022-12-03

## 2022-12-03 RX ADMIN — Medication 2000 UNIT(S): at 11:11

## 2022-12-03 RX ADMIN — SENNA PLUS 2 TABLET(S): 8.6 TABLET ORAL at 22:37

## 2022-12-03 RX ADMIN — Medication 1 MILLIGRAM(S): at 13:13

## 2022-12-03 RX ADMIN — HALOPERIDOL DECANOATE 5 MILLIGRAM(S): 100 INJECTION INTRAMUSCULAR at 00:43

## 2022-12-03 RX ADMIN — SODIUM CHLORIDE 1 GRAM(S): 9 INJECTION INTRAMUSCULAR; INTRAVENOUS; SUBCUTANEOUS at 07:03

## 2022-12-03 RX ADMIN — Medication 5 MILLIGRAM(S): at 01:59

## 2022-12-03 RX ADMIN — Medication 325 MILLIGRAM(S): at 11:11

## 2022-12-03 RX ADMIN — Medication 0.5 MILLIGRAM(S): at 07:02

## 2022-12-03 RX ADMIN — SODIUM CHLORIDE 1 GRAM(S): 9 INJECTION INTRAMUSCULAR; INTRAVENOUS; SUBCUTANEOUS at 22:37

## 2022-12-03 RX ADMIN — HEPARIN SODIUM 5000 UNIT(S): 5000 INJECTION INTRAVENOUS; SUBCUTANEOUS at 18:25

## 2022-12-03 RX ADMIN — PANTOPRAZOLE SODIUM 40 MILLIGRAM(S): 20 TABLET, DELAYED RELEASE ORAL at 07:03

## 2022-12-03 RX ADMIN — Medication 500 MILLIGRAM(S): at 11:10

## 2022-12-03 RX ADMIN — Medication 1 MILLIGRAM(S): at 22:37

## 2022-12-03 RX ADMIN — POLYETHYLENE GLYCOL 3350 17 GRAM(S): 17 POWDER, FOR SOLUTION ORAL at 11:10

## 2022-12-03 RX ADMIN — Medication 2 MILLIGRAM(S): at 14:53

## 2022-12-03 RX ADMIN — SODIUM CHLORIDE 500 MILLILITER(S): 9 INJECTION INTRAMUSCULAR; INTRAVENOUS; SUBCUTANEOUS at 02:57

## 2022-12-03 RX ADMIN — HALOPERIDOL DECANOATE 10 MILLIGRAM(S): 100 INJECTION INTRAMUSCULAR at 09:15

## 2022-12-03 RX ADMIN — SODIUM CHLORIDE 1 GRAM(S): 9 INJECTION INTRAMUSCULAR; INTRAVENOUS; SUBCUTANEOUS at 13:13

## 2022-12-03 RX ADMIN — HALOPERIDOL DECANOATE 10 MILLIGRAM(S): 100 INJECTION INTRAMUSCULAR at 20:31

## 2022-12-03 RX ADMIN — HEPARIN SODIUM 5000 UNIT(S): 5000 INJECTION INTRAVENOUS; SUBCUTANEOUS at 07:02

## 2022-12-03 RX ADMIN — Medication 650 MILLIGRAM(S): at 02:02

## 2022-12-03 RX ADMIN — Medication 0.5 MILLIGRAM(S): at 18:25

## 2022-12-03 RX ADMIN — Medication 2 MILLIGRAM(S): at 00:18

## 2022-12-03 RX ADMIN — LISINOPRIL 2.5 MILLIGRAM(S): 2.5 TABLET ORAL at 07:03

## 2022-12-03 NOTE — CHART NOTE - NSCHARTNOTEFT_GEN_A_CORE
Patient placed on restraints for agitation, as patient continues to actively pull at his lines & aggressive towards staff. Patient seen and examined at bedside. Agitated behavior noted. Pt is unable to be redirected and chemical restraints have been unsuccessful thus far.    Other interventions attempted: de-escalation, orientation check, environment modification, medication assessment    [X ] I have evaluated this patient and have determined that restraints are warranted to optimize medical care. Patient was assessed for current physical and psychological risk factors as well as special needs. There are no medical conditions or limitations that would place this patient at risk while in restraints.    Type of restraint: Bilateral soft wrist restraints  Behavioral criteria for discontinuation of restraint: [ X ] See order  Nocturnist made aware.

## 2022-12-03 NOTE — PROGRESS NOTE ADULT - SUBJECTIVE AND OBJECTIVE BOX
[facility] Division of Hospital Medicine    SUBJECTIVE / OVERNIGHT EVENTS:  agitation, confusion, disoriented, disorganized thoughts.   abusive language     Overnight noted to have elevated temp to 99F - blood cx were sent, CXR - NAD    unable to obtain ROS 2/2 mental state         Vital Signs Last 24 Hrs  T(C): 36.7 (03 Dec 2022 04:00), Max: 37.2 (03 Dec 2022 00:00)  T(F): 98 (03 Dec 2022 04:00), Max: 99 (03 Dec 2022 00:00)  HR: 106 (03 Dec 2022 12:00) (65 - 120)  BP: 110/63 (03 Dec 2022 12:00) (101/65 - 111/97)  BP(mean): 78 (03 Dec 2022 12:00) (68 - 104)  RR: 14 (03 Dec 2022 12:00) (13 - 20)  SpO2: 95% (03 Dec 2022 12:00) (95% - 100%)    Parameters below as of 03 Dec 2022 12:00  Patient On (Oxygen Delivery Method): room air          Limited physical exam due to mental state, abusive language   CONSTITUTIONAL: NAD, well-developed, comfortable in bed, awake but drowsy   RESPIRATORY: Normal respiratory effort;   MUSCLOSKELETAL:  No clubbing or cyanosis of digits  PSYCH:  +delusional, abusive, disorganized speech   NEUROLOGY: CN 2-12 are intact and symmetric; moving all extremities    SKIN: No rashes      LABS:                                    12.1   6.47  )-----------( 379      ( 03 Dec 2022 09:20 )             34.6   12-03    138  |  102  |  18.8  ----------------------------<  96  4.3   |  25.0  |  0.89    Ca    9.6      03 Dec 2022 09:20  Phos  4.0     12-03  Mg     2.1     12-03    TPro  7.8  /  Alb  3.8  /  TBili  0.4  /  DBili  x   /  AST  33  /  ALT  16  /  AlkPhos  104  12-03        MEDICATIONS  (STANDING):  ascorbic acid 500 milliGRAM(s) Oral daily  benztropine 0.5 milliGRAM(s) Oral every 12 hours  cholecalciferol 2000 Unit(s) Oral daily  ferrous    sulfate 325 milliGRAM(s) Oral daily  haloperidol     Tablet 10 milliGRAM(s) Oral <User Schedule>  haloperidol     Tablet 10 milliGRAM(s) Oral <User Schedule>  heparin   Injectable 5000 Unit(s) SubCutaneous every 12 hours  lisinopril 2.5 milliGRAM(s) Oral daily  LORazepam     Tablet 1 milliGRAM(s) Oral three times a day  pantoprazole    Tablet 40 milliGRAM(s) Oral before breakfast  polyethylene glycol 3350 17 Gram(s) Oral daily  senna 2 Tablet(s) Oral at bedtime  sodium chloride 1 Gram(s) Oral three times a day  sodium chloride 0.9%. 1000 milliLiter(s) (100 mL/Hr) IV Continuous <Continuous>    MEDICATIONS  (PRN):  acetaminophen     Tablet .. 650 milliGRAM(s) Oral every 6 hours PRN Mild Pain (1 - 3), Moderate Pain (4 - 6)  guaiFENesin Oral Liquid (Sugar-Free) 200 milliGRAM(s) Oral every 6 hours PRN Cough  haloperidol     Tablet 5 milliGRAM(s) Oral every 6 hours PRN agitation  LORazepam   Injectable 2 milliGRAM(s) IntraMuscular every 6 hours PRN Agitation                RADIOLOGY & ADDITIONAL TESTS:  Results Reviewed:   Imaging Personally Reviewed:  Electrocardiogram Personally Reviewed:

## 2022-12-03 NOTE — PROGRESS NOTE ADULT - ASSESSMENT
Patient is a 52 year old male with PMH of breast CA in remission, lymphoma, HTN, treatment-resistant schizoaffective disorder depressive type, sent in after a fall and recently with worsening disorganized behaviour.   Was planned for transfer back to  but became agitated and transferred to Stepdown.     Disorganized behavior; history of Schizoaffective disorder with Agitation   -on constant observation   -continue haldol BID  -continue ativan 1 mg TID  -continue cogentin  -Valium IV prn   Psychiatry informed that DC held off and needs better oral med titration.  -eventual return to [facility] once bed is available    Confusion likely due to worsening underlying psych disease  CTH and MRI negative.  B12 low/normal - continue injections as ordered  TSH and ammonia level negative  UA negative    Frequent Falls   Unclear if this is due to psychological illness given negative imaging    patient able to ambulate without assistance when he desires  CT head/ neck no acute findings. MRI brain also negative.  Acute minimally displaced right lateral seventh rib fracture. Healing distal left rib fractures.   fall precautions  Uncooperative with PT evaluation but was a code flight overnight and ambulating without any acute issues    History of breast cancer.   MRI brain  negative for mets  outpatient hem/onc follow up to complete course of treatment    HTN (hypertension).   BP stable; continue lisinopril    HFrEF -  unclear if acute or chronic  -TTE with EF of 40-45% for which cardio was consulted  -elevated inflammatory markers and [facility] was concerned for possible clozapine inducted myocarditis  -no evidence of decompensated heart failure  -TNI negative and non ischemic EKG changes  -seen by cardiology on 11/26 and recommended outpatient follow up with primary cardiology    Mild Hyponatremia possibly due to polydipsia vs SIADH - resolved  -appears euvolemic  -urine studies reviewed  -continue salt tabs and free water restriction    DAVID likely due to prerenal azotemia - resolved  -Creatinine normalized  -UA bland  -never required IVF because refused an IV  -good PO intake  -no further labs    DVT ppx - Heparin SC    Dispo -  Needs psych med titration   Plan discussed with Dr. Henry, RN, [facility] when able  Medically cleared for discharge

## 2022-12-03 NOTE — CHART NOTE - NSCHARTNOTEFT_GEN_A_CORE
RN called to notify pt with agitation and a low grade temperature.   Repeat rectal temp 99*F     Pt has previously received 2mg IM Ativan, 5mg IM haldol for agitation and aggression towards staff.   Pt remains agitated and threatening to staff; placed in b/l wrist restraints and given 5mg IV valium. Pt had missed prior 2 doses that were held for parameters.       Vital Signs Last 24 Hrs  T(C): 36.2 (02 Dec 2022 21:45), Max: 36.8 (02 Dec 2022 12:00)  T(F): 97.2 (02 Dec 2022 21:45), Max: 98.3 (02 Dec 2022 12:00)  HR: 84 (02 Dec 2022 16:00) (82 - 109)  BP: 106/70 (02 Dec 2022 16:00) (106/70 - 135/94)  BP(mean): 79 (02 Dec 2022 16:00) (79 - 107)  RR: 18 (02 Dec 2022 16:00) (14 - 26)  SpO2: 96% (02 Dec 2022 16:00) (96% - 100%)  Parameters below as of 02 Dec 2022 16:00  Patient On (Oxygen Delivery Method): room air      GENERAL: NAD, lying comfortably  HEAD: Atraumatic, Normocephalic  EYES: EOMI, PERRLA, conjunctiva and sclera clear  ENMT: Moist mucous membranes  NERVOUS SYSTEM: Alert and awake, Good concentration  CHEST/LUNG: Clear to auscultation b/l; Unlabored respirations  HEART: S1S2+, Regular rate and rhythm  ABDOMEN: Soft, Nontender, Nondistended; BS+   EXTREMITIES:  2+ Peripheral Pulses, No LE edema, no tenderness to palpation of b/l LE  SKIN: Warm and dry        Fever w/u initiated  Cbc, cmp, pt/inr, u/a, cxr, blood cultures x2, lactate  ekg pending  continue to monitor RN called to notify pt with agitation and a low grade temperature.   Repeat rectal temp 99*F     Pt has previously received 2mg IM Ativan, 5mg IM haldol for agitation and aggression towards staff.   Pt remains agitated and threatening to staff; placed in b/l wrist restraints and given 5mg IV valium. Pt had missed prior 2 doses that were held for parameters.     GENERAL: NAD, lying in bed, agitated  HEAD: Atraumatic, Normocephalic  EYES: conjunctiva and sclera clear  ENMT: dry mucous membranes  NERVOUS SYSTEM: Alert and awake, poor concentration, pt mumbling to   CHEST/LUNG: Clear to auscultation b/l; Unlabored respirations  HEART: S1S2+, sinus tachycardia to 119  ABDOMEN: Soft, Nontender, Nondistended; BS+   EXTREMITIES:  2+ Peripheral Pulses, No LE edema, no tenderness to palpation of b/l LE  SKIN: Warm and dry      Fever w/u initiated    Cbc, cmp, pt/inr, u/a, blood cultures x2, lactate  ekg pending  CXR reviewed; unremarkable   continue to monitor vital signs  continue to monitor  RN to monitor and notify for acute pt changes RN called to notify pt with agitation and a low grade temperature.   Repeat rectal temp 99*F     Pt has previously received 2mg IM Ativan, 5mg IM haldol for agitation and aggression towards staff.   Pt remains agitated and threatening to staff; given 5mg IV valium. Pt had missed prior 2 doses that were held for parameters.     GENERAL: NAD, lying in bed, agitated  HEAD: Atraumatic, Normocephalic  EYES: conjunctiva and sclera clear  ENMT: dry mucous membranes  NERVOUS SYSTEM: Alert and awake, poor concentration, pt mumbling to   CHEST/LUNG: Clear to auscultation b/l; Unlabored respirations  HEART: S1S2+, sinus tachycardia to 119  ABDOMEN: Soft, Nontender, Nondistended; BS+   EXTREMITIES:  2+ Peripheral Pulses, No LE edema, no tenderness to palpation of b/l LE  SKIN: Warm and dry      Fever w/u initiated    Cbc, cmp, pt/inr, u/a, blood cultures x2, lactate  ekg pending  CXR reviewed; unremarkable   continue to monitor vital signs  continue to monitor  RN to monitor and notify for acute pt changes RN called to notify pt with agitation and a low grade temperature.   Repeat rectal temp 99*F     Pt has previously received 2mg IM Ativan, 5mg IM haldol for agitation and aggression towards staff.   Pt remains agitated and threatening to staff; given 5mg IV valium. Pt had missed prior 2 doses that were held.        GENERAL: NAD, lying in bed, agitated  HEAD: Atraumatic, Normocephalic  EYES: conjunctiva and sclera clear  ENMT: dry mucous membranes  NERVOUS SYSTEM: Alert and awake, poor concentration, pt mumbling to himself   CHEST/LUNG: Clear to auscultation b/l; Unlabored respirations  HEART: S1S2+, sinus tachycardia to 119  ABDOMEN: Soft, Nontender, Nondistended; BS+   EXTREMITIES:  2+ Peripheral Pulses, No LE edema, no tenderness to palpation of b/l LE  SKIN: Warm and dry      Fever w/u initiated    Cbc, cmp, pt/inr, u/a, blood cultures x2, lactate  ekg pending  CXR reviewed; unremarkable   continue to monitor vital signs  continue to monitor  RN to monitor and notify for acute pt changes RN called to notify pt with agitation and a low grade temperature.   Repeat rectal temp 99*F     Pt has previously received 2mg IM Ativan, 5mg IM haldol for agitation and aggression towards staff.   Pt remains agitated and threatening to staff; given 5mg IV valium. Pt had missed prior 2 doses that were held.        GENERAL: NAD, lying in bed, agitated  HEAD: Atraumatic, Normocephalic  EYES: conjunctiva and sclera clear  ENMT: dry mucous membranes  NERVOUS SYSTEM: Alert and awake, poor concentration, pt mumbling to himself   CHEST/LUNG: Clear to auscultation b/l; Unlabored respirations  HEART: S1S2+, sinus tachycardia to 119  ABDOMEN: Soft, Nontender, Nondistended; BS+   EXTREMITIES:  2+ Peripheral Pulses, No LE edema, no tenderness to palpation of b/l LE  SKIN: Warm and dry      Fever w/u initiated    Cbc, cmp, pt/inr, u/a, blood cultures x2, lactate  ekg pending  CXR reviewed; unremarkable   continue to monitor vital signs  continue to monitor  RN to monitor and notify for acute pt changes    Addendum 0252  EKG with sinus tachycardia rate of 107  labs drawn; pending  U/A reviewed; unremarkable  continue to monitor pt status  RN to notify for acute changes in pt status RN called to notify pt with agitation and a low grade temperature.   Repeat rectal temp 99*F     Pt has previously received 2mg IM Ativan, 5mg IM haldol for agitation and aggression towards staff.   Pt remains agitated and threatening to staff; given 5mg IV valium. Pt had missed prior 2 doses that were held.    Vitals  Temp 99*    /97  O2 97%    GENERAL: NAD, lying in bed, agitated  HEAD: Atraumatic, Normocephalic  EYES: conjunctiva and sclera clear  ENMT: dry mucous membranes  NERVOUS SYSTEM: Alert and awake, poor concentration  CHEST/LUNG: Clear to auscultation b/l; Unlabored respirations  HEART: S1S2+, sinus tachycardia to 119  ABDOMEN: Soft, Nontender, Nondistended; BS+   EXTREMITIES:  2+ Peripheral Pulses, No LE edema, no tenderness to palpation of b/l LE  Psych: pt disorganized in thoughts and agitated  SKIN: Warm and dry      Fever w/u initiated    Cbc, cmp, pt/inr, u/a, blood cultures x2, lactate  ekg pending  CXR reviewed; unremarkable   continue to monitor vital signs  continue to monitor  RN to monitor and notify for acute pt changes    Addendum 0252  EKG with sinus tachycardia rate of 107   at bedside, improving  250cc NS bolus ordered   cbc reviewed  lactate negative  U/A reviewed; unremarkable  continue to monitor pt status  RN to notify for acute changes in pt status

## 2022-12-04 PROCEDURE — 99233 SBSQ HOSP IP/OBS HIGH 50: CPT

## 2022-12-04 RX ADMIN — SODIUM CHLORIDE 1 GRAM(S): 9 INJECTION INTRAMUSCULAR; INTRAVENOUS; SUBCUTANEOUS at 18:04

## 2022-12-04 RX ADMIN — POLYETHYLENE GLYCOL 3350 17 GRAM(S): 17 POWDER, FOR SOLUTION ORAL at 13:47

## 2022-12-04 RX ADMIN — HEPARIN SODIUM 5000 UNIT(S): 5000 INJECTION INTRAVENOUS; SUBCUTANEOUS at 18:04

## 2022-12-04 RX ADMIN — Medication 1 MILLIGRAM(S): at 06:59

## 2022-12-04 RX ADMIN — SODIUM CHLORIDE 1 GRAM(S): 9 INJECTION INTRAMUSCULAR; INTRAVENOUS; SUBCUTANEOUS at 21:57

## 2022-12-04 RX ADMIN — LISINOPRIL 2.5 MILLIGRAM(S): 2.5 TABLET ORAL at 07:00

## 2022-12-04 RX ADMIN — Medication 1 MILLIGRAM(S): at 21:57

## 2022-12-04 RX ADMIN — SENNA PLUS 2 TABLET(S): 8.6 TABLET ORAL at 21:57

## 2022-12-04 RX ADMIN — Medication 0.5 MILLIGRAM(S): at 07:00

## 2022-12-04 RX ADMIN — Medication 500 MILLIGRAM(S): at 13:46

## 2022-12-04 RX ADMIN — Medication 1 MILLIGRAM(S): at 18:03

## 2022-12-04 RX ADMIN — Medication 0.5 MILLIGRAM(S): at 19:55

## 2022-12-04 RX ADMIN — Medication 2000 UNIT(S): at 13:46

## 2022-12-04 RX ADMIN — HALOPERIDOL DECANOATE 10 MILLIGRAM(S): 100 INJECTION INTRAMUSCULAR at 20:50

## 2022-12-04 RX ADMIN — Medication 325 MILLIGRAM(S): at 13:46

## 2022-12-04 RX ADMIN — HALOPERIDOL DECANOATE 10 MILLIGRAM(S): 100 INJECTION INTRAMUSCULAR at 13:46

## 2022-12-04 RX ADMIN — PANTOPRAZOLE SODIUM 40 MILLIGRAM(S): 20 TABLET, DELAYED RELEASE ORAL at 07:00

## 2022-12-04 RX ADMIN — HEPARIN SODIUM 5000 UNIT(S): 5000 INJECTION INTRAVENOUS; SUBCUTANEOUS at 06:59

## 2022-12-04 RX ADMIN — SODIUM CHLORIDE 1 GRAM(S): 9 INJECTION INTRAMUSCULAR; INTRAVENOUS; SUBCUTANEOUS at 06:59

## 2022-12-04 NOTE — PROGRESS NOTE ADULT - ASSESSMENT
Patient is a 52 year old male with PMH of breast CA in remission, lymphoma, HTN, treatment-resistant schizoaffective disorder depressive type, sent in after a fall and recently with worsening disorganized behaviour.   Was planned for transfer back to  but became agitated and transferred to Stepdown.     Disorganized behavior; history of Schizoaffective disorder with Agitation   -on constant observation   -continue haldol BID  -continue ativan 1 mg TID  -continue cogentin  -Valium IV prn   Psychiatry informed that DC held off and needs better oral med titration.  -eventual return to [facility] once bed is available    Confusion likely due to worsening underlying psych disease  CTH and MRI negative.  B12 low/normal - continue injections as ordered  TSH and ammonia level negative  UA negative    Frequent Falls   Unclear if this is due to psychological illness   patient able to ambulate without assistance when he desires  CT head/ neck no acute findings. MRI brain also negative.  Acute minimally displaced right lateral seventh rib fracture. Healing distal left rib fractures.   fall precautions  Uncooperative with PT evaluation but was a code flight overnight and ambulating without any acute issues    History of breast cancer.   MRI brain  negative for mets  outpatient hem/onc follow up to complete course of treatment    HTN (hypertension).   BP stable; continue lisinopril    HFrEF -  unclear if acute or chronic  -TTE with EF of 40-45% for which cardio was consulted  -elevated inflammatory markers and [facility] was concerned for possible clozapine inducted myocarditis  -no evidence of decompensated heart failure  -TNI negative and non ischemic EKG changes  -seen by cardiology on 11/26 and recommended outpatient follow up with primary cardiology    Mild Hyponatremia possibly due to polydipsia vs SIADH - resolved  -appears euvolemic  -urine studies reviewed  -continue salt tabs and free water restriction    DAVID likely due to prerenal azotemia - resolved    DVT ppx - Heparin SC    Dispo -  Needs psych med titration   Plan discussed with Dr. Henry, RN, [facility] when able  Medically cleared for discharge - no beds per  today

## 2022-12-04 NOTE — CHART NOTE - NSCHARTNOTEFT_GEN_A_CORE
1:1 and bilateral wrist restraints renewed for safety of pt. and staff, as well as, medical management  Dr. Portillo aware

## 2022-12-04 NOTE — PROGRESS NOTE ADULT - SUBJECTIVE AND OBJECTIVE BOX
[facility] Division of Hospital Medicine    SUBJECTIVE / OVERNIGHT EVENTS:  agitation, confusion, disoriented, disorganized thoughts.   calmer this morning, had his breakfast       denies any pain        Vital Signs Last 24 Hrs  T(C): 36.6 (04 Dec 2022 10:20), Max: 37 (03 Dec 2022 20:00)  T(F): 97.8 (04 Dec 2022 10:20), Max: 98.6 (03 Dec 2022 20:00)  HR: 75 (04 Dec 2022 10:20) (70 - 97)  BP: 98/63 (04 Dec 2022 10:20) (97/67 - 143/85)  BP(mean): 81 (03 Dec 2022 20:00) (77 - 81)  RR: 19 (04 Dec 2022 10:20) (13 - 20)  SpO2: 100% (04 Dec 2022 10:20) (98% - 100%)    Parameters below as of 04 Dec 2022 10:20  Patient On (Oxygen Delivery Method): room air          Limited physical exam due to mental state, abusive language   CONSTITUTIONAL: NAD, well-developed, comfortable in bed, awake but drowsy   RESPIRATORY: Normal respiratory effort;   MUSCLOSKELETAL:  No clubbing or cyanosis of digits  PSYCH:  +delusional, abusive, disorganized speech   NEUROLOGY: CN 2-12 are intact and symmetric; moving all extremities    SKIN: No rashes      LABS:                                                        12.1   6.47  )-----------( 379      ( 03 Dec 2022 09:20 )             34.6   12-03    138  |  102  |  18.8  ----------------------------<  96  4.3   |  25.0  |  0.89    Ca    9.6      03 Dec 2022 09:20  Phos  4.0     12-03  Mg     2.1     12-03    TPro  7.8  /  Alb  3.8  /  TBili  0.4  /  DBili  x   /  AST  33  /  ALT  16  /  AlkPhos  104  12-03          MEDICATIONS  (STANDING):  ascorbic acid 500 milliGRAM(s) Oral daily  benztropine 0.5 milliGRAM(s) Oral every 12 hours  cholecalciferol 2000 Unit(s) Oral daily  ferrous    sulfate 325 milliGRAM(s) Oral daily  haloperidol     Tablet 10 milliGRAM(s) Oral <User Schedule>  haloperidol     Tablet 10 milliGRAM(s) Oral <User Schedule>  heparin   Injectable 5000 Unit(s) SubCutaneous every 12 hours  lisinopril 2.5 milliGRAM(s) Oral daily  LORazepam     Tablet 1 milliGRAM(s) Oral three times a day  pantoprazole    Tablet 40 milliGRAM(s) Oral before breakfast  polyethylene glycol 3350 17 Gram(s) Oral daily  senna 2 Tablet(s) Oral at bedtime  sodium chloride 1 Gram(s) Oral three times a day  sodium chloride 0.9%. 1000 milliLiter(s) (100 mL/Hr) IV Continuous <Continuous>    MEDICATIONS  (PRN):  acetaminophen     Tablet .. 650 milliGRAM(s) Oral every 6 hours PRN Mild Pain (1 - 3), Moderate Pain (4 - 6)  guaiFENesin Oral Liquid (Sugar-Free) 200 milliGRAM(s) Oral every 6 hours PRN Cough  haloperidol     Tablet 5 milliGRAM(s) Oral every 6 hours PRN agitation  LORazepam   Injectable 2 milliGRAM(s) IntraMuscular every 6 hours PRN Agitation                  RADIOLOGY & ADDITIONAL TESTS:  Results Reviewed:   Imaging Personally Reviewed:  Electrocardiogram Personally Reviewed:

## 2022-12-04 NOTE — CHART NOTE - NSCHARTNOTEFT_GEN_A_CORE
Pt with a history of Schizophrenia  Pt with agitation, confusion, disoriented, disorganized thoughts.  VSS NAD  Constant observation and Harpreet wrist restraints renewed  I have evaluated this patient and have determined that restraints are warranted to optimize medical care. Patient was assessed for current physical and psychological risk factors as well as special needs. There are no medical conditions or limitations that would place this patient at risk while in restraints. Dr. Ritchie made aware.

## 2022-12-05 LAB
MRSA PCR RESULT.: DETECTED
S AUREUS DNA NOSE QL NAA+PROBE: DETECTED

## 2022-12-05 PROCEDURE — 99233 SBSQ HOSP IP/OBS HIGH 50: CPT

## 2022-12-05 RX ORDER — MUPIROCIN 20 MG/G
1 OINTMENT TOPICAL
Refills: 0 | Status: DISCONTINUED | OUTPATIENT
Start: 2022-12-05 | End: 2022-12-07

## 2022-12-05 RX ORDER — CHLORHEXIDINE GLUCONATE 213 G/1000ML
1 SOLUTION TOPICAL DAILY
Refills: 0 | Status: DISCONTINUED | OUTPATIENT
Start: 2022-12-05 | End: 2022-12-08

## 2022-12-05 RX ADMIN — PANTOPRAZOLE SODIUM 40 MILLIGRAM(S): 20 TABLET, DELAYED RELEASE ORAL at 05:16

## 2022-12-05 RX ADMIN — Medication 325 MILLIGRAM(S): at 13:22

## 2022-12-05 RX ADMIN — POLYETHYLENE GLYCOL 3350 17 GRAM(S): 17 POWDER, FOR SOLUTION ORAL at 13:23

## 2022-12-05 RX ADMIN — SODIUM CHLORIDE 1 GRAM(S): 9 INJECTION INTRAMUSCULAR; INTRAVENOUS; SUBCUTANEOUS at 13:23

## 2022-12-05 RX ADMIN — Medication 1 MILLIGRAM(S): at 13:22

## 2022-12-05 RX ADMIN — Medication 0.5 MILLIGRAM(S): at 17:21

## 2022-12-05 RX ADMIN — SODIUM CHLORIDE 1 GRAM(S): 9 INJECTION INTRAMUSCULAR; INTRAVENOUS; SUBCUTANEOUS at 22:36

## 2022-12-05 RX ADMIN — SENNA PLUS 2 TABLET(S): 8.6 TABLET ORAL at 22:36

## 2022-12-05 RX ADMIN — Medication 500 MILLIGRAM(S): at 13:22

## 2022-12-05 RX ADMIN — MUPIROCIN 1 APPLICATION(S): 20 OINTMENT TOPICAL at 23:23

## 2022-12-05 RX ADMIN — HEPARIN SODIUM 5000 UNIT(S): 5000 INJECTION INTRAVENOUS; SUBCUTANEOUS at 05:16

## 2022-12-05 RX ADMIN — LISINOPRIL 2.5 MILLIGRAM(S): 2.5 TABLET ORAL at 06:48

## 2022-12-05 RX ADMIN — HALOPERIDOL DECANOATE 10 MILLIGRAM(S): 100 INJECTION INTRAMUSCULAR at 20:27

## 2022-12-05 RX ADMIN — HALOPERIDOL DECANOATE 10 MILLIGRAM(S): 100 INJECTION INTRAMUSCULAR at 08:51

## 2022-12-05 RX ADMIN — Medication 1 MILLIGRAM(S): at 22:36

## 2022-12-05 RX ADMIN — Medication 0.5 MILLIGRAM(S): at 05:17

## 2022-12-05 RX ADMIN — SODIUM CHLORIDE 1 GRAM(S): 9 INJECTION INTRAMUSCULAR; INTRAVENOUS; SUBCUTANEOUS at 05:16

## 2022-12-05 RX ADMIN — Medication 2000 UNIT(S): at 13:23

## 2022-12-05 RX ADMIN — HALOPERIDOL DECANOATE 5 MILLIGRAM(S): 100 INJECTION INTRAMUSCULAR at 00:49

## 2022-12-05 RX ADMIN — Medication 2 MILLIGRAM(S): at 05:16

## 2022-12-05 RX ADMIN — HEPARIN SODIUM 5000 UNIT(S): 5000 INJECTION INTRAVENOUS; SUBCUTANEOUS at 17:21

## 2022-12-05 NOTE — PROGRESS NOTE ADULT - ASSESSMENT
Patient is a 52 year old male with PMH of breast CA in remission, lymphoma, HTN, treatment-resistant schizoaffective disorder depressive type, sent in after a fall and recently with worsening disorganized behaviour.   Was planned for transfer back to  but became agitated and transferred to Stepdown.     Disorganized behavior; history of Schizoaffective disorder with Agitation   -on constant observation   -continue haldol BID  -continue ativan 1 mg TID  -continue cogentin  -Valium IV prn   Psychiatry recs   -eventual return to [facility] once bed is available?     Confusion likely due to worsening underlying psych disease  CTH and MRI negative.  B12 low/normal - continue injections as ordered  TSH and ammonia level negative  UA negative    Frequent Falls   Unclear if this is due to psychological illness   patient able to ambulate without assistance when he desires  CT head/ neck no acute findings. MRI brain also negative.  Acute minimally displaced right lateral seventh rib fracture. Healing distal left rib fractures.   fall precautions  Uncooperative with PT evaluation but was a code flight overnight and ambulating without any acute issues    History of breast cancer.   MRI brain  negative for mets  outpatient hem/onc follow up to complete course of treatment    HTN (hypertension).   BP stable; continue lisinopril    HFrEF -  unclear if acute or chronic  -TTE with EF of 40-45% for which cardio was consulted  -elevated inflammatory markers and [facility] was concerned for possible clozapine inducted myocarditis  -no evidence of decompensated heart failure  -TNI negative and non ischemic EKG changes  -seen by cardiology on 11/26 and recommended outpatient follow up with primary cardiology    Mild Hyponatremia possibly due to polydipsia vs SIADH - resolved  -appears euvolemic  -urine studies reviewed  -continue salt tabs and free water restriction    DAVID likely due to prerenal azotemia - resolved    DVT ppx - Heparin SC    Dispo -medically stable - needs psych inpatient   Plan discussed with  RN, SW

## 2022-12-05 NOTE — CHART NOTE - NSCHARTNOTEFT_GEN_A_CORE
RN called pt retaining >400 cc, straight cath x 1. Will continue to monitor w/ bladder scans and check VS.

## 2022-12-05 NOTE — PROGRESS NOTE ADULT - SUBJECTIVE AND OBJECTIVE BOX
[facility] Division of Hospital Medicine    SUBJECTIVE / OVERNIGHT EVENTS:  agitation, confusion, disoriented, disorganized thoughts.   did not sleep well last night     denies any pain        Vital Signs Last 24 Hrs  T(C): 36.9 (05 Dec 2022 09:47), Max: 36.9 (05 Dec 2022 09:47)  T(F): 98.5 (05 Dec 2022 09:47), Max: 98.5 (05 Dec 2022 09:47)  HR: 96 (05 Dec 2022 09:47) (78 - 96)  BP: 126/82 (05 Dec 2022 09:47) (113/74 - 126/82)  BP(mean): --  RR: 18 (05 Dec 2022 09:47) (18 - 18)  SpO2: 97% (05 Dec 2022 09:47) (97% - 100%)    Parameters below as of 05 Dec 2022 09:47  Patient On (Oxygen Delivery Method): room air              Limited physical exam due to mental state, abusive language   CONSTITUTIONAL: NAD, well-developed, comfortable in bed, awake but drowsy   MEDICATIONS  (STANDING):  ascorbic acid 500 milliGRAM(s) Oral daily  benztropine 0.5 milliGRAM(s) Oral every 12 hours  chlorhexidine 2% Cloths 1 Application(s) Topical daily  cholecalciferol 2000 Unit(s) Oral daily  ferrous    sulfate 325 milliGRAM(s) Oral daily  haloperidol     Tablet 10 milliGRAM(s) Oral <User Schedule>  haloperidol     Tablet 10 milliGRAM(s) Oral <User Schedule>  heparin   Injectable 5000 Unit(s) SubCutaneous every 12 hours  lisinopril 2.5 milliGRAM(s) Oral daily  LORazepam     Tablet 1 milliGRAM(s) Oral three times a day  pantoprazole    Tablet 40 milliGRAM(s) Oral before breakfast  polyethylene glycol 3350 17 Gram(s) Oral daily  senna 2 Tablet(s) Oral at bedtime  sodium chloride 1 Gram(s) Oral three times a day  sodium chloride 0.9%. 1000 milliLiter(s) (100 mL/Hr) IV Continuous <Continuous>    MEDICATIONS  (PRN):  acetaminophen     Tablet .. 650 milliGRAM(s) Oral every 6 hours PRN Mild Pain (1 - 3), Moderate Pain (4 - 6)  guaiFENesin Oral Liquid (Sugar-Free) 200 milliGRAM(s) Oral every 6 hours PRN Cough  haloperidol     Tablet 5 milliGRAM(s) Oral every 6 hours PRN agitation  LORazepam   Injectable 2 milliGRAM(s) IntraMuscular every 6 hours PRN Agitation  RESPIRATORY: Normal respiratory effort;   MUSCLOSKELETAL:  No clubbing or cyanosis of digits  PSYCH:  +delusional, abusive, disorganized speech   NEUROLOGY: CN 2-12 are intact and symmetric; moving all extremities    SKIN: No rashes      LABS:                                  RADIOLOGY & ADDITIONAL TESTS:  Results Reviewed:   Imaging Personally Reviewed:  Electrocardiogram Personally Reviewed:

## 2022-12-06 LAB — SARS-COV-2 RNA SPEC QL NAA+PROBE: SIGNIFICANT CHANGE UP

## 2022-12-06 PROCEDURE — 99233 SBSQ HOSP IP/OBS HIGH 50: CPT

## 2022-12-06 RX ADMIN — Medication 500 MILLIGRAM(S): at 12:26

## 2022-12-06 RX ADMIN — Medication 325 MILLIGRAM(S): at 12:26

## 2022-12-06 RX ADMIN — SODIUM CHLORIDE 1 GRAM(S): 9 INJECTION INTRAMUSCULAR; INTRAVENOUS; SUBCUTANEOUS at 06:05

## 2022-12-06 RX ADMIN — Medication 2000 UNIT(S): at 12:27

## 2022-12-06 RX ADMIN — SODIUM CHLORIDE 1 GRAM(S): 9 INJECTION INTRAMUSCULAR; INTRAVENOUS; SUBCUTANEOUS at 22:50

## 2022-12-06 RX ADMIN — MUPIROCIN 1 APPLICATION(S): 20 OINTMENT TOPICAL at 06:05

## 2022-12-06 RX ADMIN — SODIUM CHLORIDE 1 GRAM(S): 9 INJECTION INTRAMUSCULAR; INTRAVENOUS; SUBCUTANEOUS at 17:58

## 2022-12-06 RX ADMIN — HALOPERIDOL DECANOATE 10 MILLIGRAM(S): 100 INJECTION INTRAMUSCULAR at 22:50

## 2022-12-06 RX ADMIN — Medication 1 MILLIGRAM(S): at 06:04

## 2022-12-06 RX ADMIN — Medication 1 MILLIGRAM(S): at 22:50

## 2022-12-06 RX ADMIN — CHLORHEXIDINE GLUCONATE 1 APPLICATION(S): 213 SOLUTION TOPICAL at 12:49

## 2022-12-06 RX ADMIN — LISINOPRIL 2.5 MILLIGRAM(S): 2.5 TABLET ORAL at 06:04

## 2022-12-06 RX ADMIN — HEPARIN SODIUM 5000 UNIT(S): 5000 INJECTION INTRAVENOUS; SUBCUTANEOUS at 06:05

## 2022-12-06 RX ADMIN — Medication 0.5 MILLIGRAM(S): at 17:58

## 2022-12-06 RX ADMIN — SENNA PLUS 2 TABLET(S): 8.6 TABLET ORAL at 22:51

## 2022-12-06 RX ADMIN — Medication 1 MILLIGRAM(S): at 17:59

## 2022-12-06 RX ADMIN — HALOPERIDOL DECANOATE 10 MILLIGRAM(S): 100 INJECTION INTRAMUSCULAR at 08:20

## 2022-12-06 RX ADMIN — Medication 0.5 MILLIGRAM(S): at 06:03

## 2022-12-06 RX ADMIN — HEPARIN SODIUM 5000 UNIT(S): 5000 INJECTION INTRAVENOUS; SUBCUTANEOUS at 17:59

## 2022-12-06 RX ADMIN — PANTOPRAZOLE SODIUM 40 MILLIGRAM(S): 20 TABLET, DELAYED RELEASE ORAL at 06:05

## 2022-12-06 NOTE — CONSULT NOTE ADULT - REASON FOR ADMISSION
Disorganized behaviour / confusion / fall

## 2022-12-06 NOTE — PROGRESS NOTE ADULT - SUBJECTIVE AND OBJECTIVE BOX
[facility] Division of Hospital Medicine    SUBJECTIVE / OVERNIGHT EVENTS:  agitation, confusion, disoriented, disorganized thoughts.       denies any pain, urinating without any issues,           Vital Signs Last 24 Hrs  T(C): 36.8 (06 Dec 2022 12:15), Max: 37 (05 Dec 2022 15:34)  T(F): 98.3 (06 Dec 2022 12:15), Max: 98.6 (05 Dec 2022 15:34)  HR: 89 (06 Dec 2022 12:15) (75 - 102)  BP: 108/70 (06 Dec 2022 12:15) (108/70 - 138/91)  BP(mean): --  RR: 18 (06 Dec 2022 12:15) (18 - 19)  SpO2: 99% (06 Dec 2022 12:15) (98% - 99%)    Parameters below as of 06 Dec 2022 12:15  Patient On (Oxygen Delivery Method): room air                  Limited physical exam due to mental state, abusive language   CONSTITUTIONAL: NAD, well-developed, comfortable in bed, awake but drowsy   RESPIRATORY: Normal respiratory effort;   MUSCLOSKELETAL:  No clubbing or cyanosis of digits  PSYCH:  +delusional, abusive, disorganized speech   NEUROLOGY: CN 2-12 are intact and symmetric; moving all extremities    SKIN: No rashes            LABS:                    MEDICATIONS  (STANDING):  ascorbic acid 500 milliGRAM(s) Oral daily  benztropine 0.5 milliGRAM(s) Oral every 12 hours  chlorhexidine 2% Cloths 1 Application(s) Topical daily  cholecalciferol 2000 Unit(s) Oral daily  ferrous    sulfate 325 milliGRAM(s) Oral daily  haloperidol     Tablet 10 milliGRAM(s) Oral <User Schedule>  haloperidol     Tablet 10 milliGRAM(s) Oral <User Schedule>  heparin   Injectable 5000 Unit(s) SubCutaneous every 12 hours  lisinopril 2.5 milliGRAM(s) Oral daily  LORazepam     Tablet 1 milliGRAM(s) Oral three times a day  mupirocin 2% Nasal 1 Application(s) Both Nostrils two times a day  pantoprazole    Tablet 40 milliGRAM(s) Oral before breakfast  polyethylene glycol 3350 17 Gram(s) Oral daily  senna 2 Tablet(s) Oral at bedtime  sodium chloride 1 Gram(s) Oral three times a day  sodium chloride 0.9%. 1000 milliLiter(s) (100 mL/Hr) IV Continuous <Continuous>    MEDICATIONS  (PRN):  acetaminophen     Tablet .. 650 milliGRAM(s) Oral every 6 hours PRN Mild Pain (1 - 3), Moderate Pain (4 - 6)  guaiFENesin Oral Liquid (Sugar-Free) 200 milliGRAM(s) Oral every 6 hours PRN Cough  haloperidol     Tablet 5 milliGRAM(s) Oral every 6 hours PRN agitation  LORazepam   Injectable 2 milliGRAM(s) IntraMuscular every 6 hours PRN Agitation                RADIOLOGY & ADDITIONAL TESTS:  Results Reviewed:   Imaging Personally Reviewed:  Electrocardiogram Personally Reviewed:

## 2022-12-06 NOTE — CONSULT NOTE ADULT - ASSESSMENT
53 y/o male with PMHx of breat Ca,  lymphoma, HTN, treatment-resistant schizoaffective disorder depressive type, is sent from Dr Sims at [facility]     Oncology consulted for h/o Breast cancer  called Dr Beltran office patient is s/p Bastectomy and LN dissection with 8/ 10 axillary LN positive with a St III ER -ve Her 2 +ve Breast cancer. Post surgery ( may 2022) he was started on adjuvant Carbo/ Taxol weekly and Herceptin/ Perjeta q 3 weeks with a plan for 12 cycles of carbo/ taxol and to complete 1 y of herceptin / Perjeta   Patient completed 10 cycles of Carbo/taxol and 3 cycles of Herceptin / Perjeta     He was last seen in the onc office and treated on 8/22/22       INPROGRESS  53 y/o male with PMHx of breat Ca,  lymphoma, HTN, treatment-resistant schizoaffective disorder depressive type, is sent from Dr Sims at [facility]     Oncology consulted for h/o Breast cancer  called Dr Beltran office patient is s/p Bastectomy and LN dissection with 8/ 10 axillary LN positive with a St III ER -ve Her 2 +ve Breast cancer. Post surgery ( may 2022) he was started on adjuvant Carbo/ Taxol weekly and Herceptin/ Perjeta q 3 weeks with a plan for 12 cycles of carbo/ taxol and to complete 1 y of herceptin / Perjeta     He was last seen in the onc office and treated on 8/22/22, Patient completed 10 cycles of Carbo/taxol and 3 cycles of Herceptin / Perjeta     Discussed with Primary Oncologist office ( spoke to FARZAD Sanchez) Will not complete last 2 treatments of taxol given significant time lapse  Ok for Patient to be discharged to [facility]. No further treatment for now. Follow up with Dr Beltran office once discharged from [facility] to assess treatment plan

## 2022-12-06 NOTE — PROGRESS NOTE ADULT - ASSESSMENT
Patient is a 52 year old male with PMH of breast CA in remission, lymphoma, HTN, treatment-resistant schizoaffective disorder depressive type, sent in after a fall and recently with worsening disorganized behaviour.   Was planned for transfer back to  but became agitated and transferred to Stepdown.     Disorganized behavior; history of Schizoaffective disorder with Agitation   -on constant observation   -continue haldol BID  -continue ativan 1 mg TID  -continue cogentin  -Valium IV prn   Psychiatry recs   -eventual return to [facility] once bed is available?     Confusion likely due to worsening underlying psych disease  CTH and MRI negative.  B12 low/normal - continue injections as ordered  TSH and ammonia level negative  UA negative    Frequent Falls   Unclear if this is due to psychological illness   patient able to ambulate without assistance when he desires  CT head/ neck no acute findings. MRI brain also negative.  Acute minimally displaced right lateral seventh rib fracture. Healing distal left rib fractures.   fall precautions  Uncooperative with PT evaluation but was a code flight overnight and ambulating without any acute issues    History of breast cancer.   MRI brain  negative for mets  outpatient hem/onc follow up to complete course of treatment    HTN (hypertension).   BP stable; continue lisinopril    HFrEF -  chronic   -TTE with EF of 40-45% for which cardio was consulted  -elevated inflammatory markers and [facility] was concerned for possible clozapine inducted myocarditis  -no evidence of decompensated heart failure  -TNI negative and non ischemic EKG changes  -seen by cardiology on 11/26 and recommended outpatient follow up with primary cardiology    Mild Hyponatremia possibly due to polydipsia vs SIADH - resolved  -appears euvolemic  -urine studies reviewed  -continue salt tabs and free water restriction    DAVID likely due to prerenal azotemia - resolved    DVT ppx - Heparin SC    Dispo -medically stable - needs psych inpatient   Plan discussed with  RN, SW

## 2022-12-06 NOTE — CHART NOTE - NSCHARTNOTEFT_GEN_A_CORE
Called by RN to renew constant observation/wrist restraint.  Pt w/ treatment resistant schizoaffective disorder admitted after fall.  Pt w/ disorganized behavior, confusion, frequent falls.  Pt is at risk of elopement, pt should be on 1:1 as per psych recommendation.  RN stated pt gets aggressive towards staff while cleaning/ obtaining vitals.  VSS.  NAD.  1:1/ wrist restraint ordered for pt's and staff safety.  Monitor and reassess need for continuation of restraints in am.

## 2022-12-07 PROCEDURE — 99223 1ST HOSP IP/OBS HIGH 75: CPT

## 2022-12-07 PROCEDURE — 99233 SBSQ HOSP IP/OBS HIGH 50: CPT

## 2022-12-07 RX ADMIN — HALOPERIDOL DECANOATE 5 MILLIGRAM(S): 100 INJECTION INTRAMUSCULAR at 12:37

## 2022-12-07 RX ADMIN — Medication 2000 UNIT(S): at 12:38

## 2022-12-07 RX ADMIN — HEPARIN SODIUM 5000 UNIT(S): 5000 INJECTION INTRAVENOUS; SUBCUTANEOUS at 17:12

## 2022-12-07 RX ADMIN — Medication 500 MILLIGRAM(S): at 12:39

## 2022-12-07 RX ADMIN — Medication 0.5 MILLIGRAM(S): at 17:12

## 2022-12-07 RX ADMIN — Medication 0.5 MILLIGRAM(S): at 05:44

## 2022-12-07 RX ADMIN — Medication 1 MILLIGRAM(S): at 20:30

## 2022-12-07 RX ADMIN — SODIUM CHLORIDE 1 GRAM(S): 9 INJECTION INTRAMUSCULAR; INTRAVENOUS; SUBCUTANEOUS at 05:45

## 2022-12-07 RX ADMIN — Medication 1 MILLIGRAM(S): at 05:45

## 2022-12-07 RX ADMIN — Medication 1 MILLIGRAM(S): at 12:37

## 2022-12-07 RX ADMIN — PANTOPRAZOLE SODIUM 40 MILLIGRAM(S): 20 TABLET, DELAYED RELEASE ORAL at 05:44

## 2022-12-07 RX ADMIN — SODIUM CHLORIDE 1 GRAM(S): 9 INJECTION INTRAMUSCULAR; INTRAVENOUS; SUBCUTANEOUS at 13:23

## 2022-12-07 RX ADMIN — HALOPERIDOL DECANOATE 10 MILLIGRAM(S): 100 INJECTION INTRAMUSCULAR at 08:47

## 2022-12-07 RX ADMIN — LISINOPRIL 2.5 MILLIGRAM(S): 2.5 TABLET ORAL at 05:44

## 2022-12-07 RX ADMIN — SODIUM CHLORIDE 1 GRAM(S): 9 INJECTION INTRAMUSCULAR; INTRAVENOUS; SUBCUTANEOUS at 20:29

## 2022-12-07 RX ADMIN — Medication 325 MILLIGRAM(S): at 12:38

## 2022-12-07 RX ADMIN — SENNA PLUS 2 TABLET(S): 8.6 TABLET ORAL at 20:29

## 2022-12-07 RX ADMIN — HALOPERIDOL DECANOATE 10 MILLIGRAM(S): 100 INJECTION INTRAMUSCULAR at 20:30

## 2022-12-07 RX ADMIN — POLYETHYLENE GLYCOL 3350 17 GRAM(S): 17 POWDER, FOR SOLUTION ORAL at 12:50

## 2022-12-07 RX ADMIN — CHLORHEXIDINE GLUCONATE 1 APPLICATION(S): 213 SOLUTION TOPICAL at 10:44

## 2022-12-07 NOTE — PROGRESS NOTE ADULT - SUBJECTIVE AND OBJECTIVE BOX
[facility] Division of Hospital Medicine    SUBJECTIVE / OVERNIGHT EVENTS:  agitation, confusion, disoriented, disorganized thoughts, throwing food at Aide       No acute events: but remains irrational   denies any pain, urinating without any issues,           Vital Signs Last 24 Hrs  T(C): 36.8 (07 Dec 2022 10:06), Max: 36.8 (07 Dec 2022 10:06)  T(F): 98.2 (07 Dec 2022 10:06), Max: 98.2 (07 Dec 2022 10:06)  HR: 84 (07 Dec 2022 10:06) (68 - 84)  BP: 108/70 (07 Dec 2022 10:06) (105/69 - 114/73)  BP(mean): --  RR: 16 (07 Dec 2022 10:06) (16 - 18)  SpO2: 100% (07 Dec 2022 10:06) (96% - 100%)    Parameters below as of 07 Dec 2022 10:06  Patient On (Oxygen Delivery Method): room air          Limited physical exam due to mental state, abusive language   CONSTITUTIONAL: NAD, well-developed, comfortable in bed, awake  RESPIRATORY: Normal respiratory effort;   MUSCLOSKELETAL:  No clubbing or cyanosis of digits  PSYCH:  +delusional, abusive, disorganized speech   NEUROLOGY: CN 2-12 are intact and symmetric; moving all extremities    SKIN: No rashes            LABS:                    MEDICATIONS  (STANDING):  ascorbic acid 500 milliGRAM(s) Oral daily  benztropine 0.5 milliGRAM(s) Oral every 12 hours  chlorhexidine 2% Cloths 1 Application(s) Topical daily  cholecalciferol 2000 Unit(s) Oral daily  ferrous    sulfate 325 milliGRAM(s) Oral daily  haloperidol     Tablet 10 milliGRAM(s) Oral <User Schedule>  haloperidol     Tablet 10 milliGRAM(s) Oral <User Schedule>  heparin   Injectable 5000 Unit(s) SubCutaneous every 12 hours  lisinopril 2.5 milliGRAM(s) Oral daily  LORazepam     Tablet 1 milliGRAM(s) Oral three times a day  mupirocin 2% Nasal 1 Application(s) Both Nostrils two times a day  pantoprazole    Tablet 40 milliGRAM(s) Oral before breakfast  polyethylene glycol 3350 17 Gram(s) Oral daily  senna 2 Tablet(s) Oral at bedtime  sodium chloride 1 Gram(s) Oral three times a day  sodium chloride 0.9%. 1000 milliLiter(s) (100 mL/Hr) IV Continuous <Continuous>    MEDICATIONS  (PRN):  acetaminophen     Tablet .. 650 milliGRAM(s) Oral every 6 hours PRN Mild Pain (1 - 3), Moderate Pain (4 - 6)  guaiFENesin Oral Liquid (Sugar-Free) 200 milliGRAM(s) Oral every 6 hours PRN Cough  haloperidol     Tablet 5 milliGRAM(s) Oral every 6 hours PRN agitation          RADIOLOGY & ADDITIONAL TESTS:  Results Reviewed:   Imaging Personally Reviewed:  Electrocardiogram Personally Reviewed:

## 2022-12-08 ENCOUNTER — TRANSCRIPTION ENCOUNTER (OUTPATIENT)
Age: 52
End: 2022-12-08

## 2022-12-08 VITALS
OXYGEN SATURATION: 100 % | RESPIRATION RATE: 20 BRPM | SYSTOLIC BLOOD PRESSURE: 107 MMHG | DIASTOLIC BLOOD PRESSURE: 67 MMHG | HEART RATE: 80 BPM | TEMPERATURE: 99 F

## 2022-12-08 LAB
CULTURE RESULTS: SIGNIFICANT CHANGE UP
CULTURE RESULTS: SIGNIFICANT CHANGE UP
SARS-COV-2 RNA SPEC QL NAA+PROBE: SIGNIFICANT CHANGE UP
SPECIMEN SOURCE: SIGNIFICANT CHANGE UP
SPECIMEN SOURCE: SIGNIFICANT CHANGE UP

## 2022-12-08 PROCEDURE — 93306 TTE W/DOPPLER COMPLETE: CPT

## 2022-12-08 PROCEDURE — 84300 ASSAY OF URINE SODIUM: CPT

## 2022-12-08 PROCEDURE — 72125 CT NECK SPINE W/O DYE: CPT | Mod: MA

## 2022-12-08 PROCEDURE — 85045 AUTOMATED RETICULOCYTE COUNT: CPT

## 2022-12-08 PROCEDURE — 84550 ASSAY OF BLOOD/URIC ACID: CPT

## 2022-12-08 PROCEDURE — 82746 ASSAY OF FOLIC ACID SERUM: CPT

## 2022-12-08 PROCEDURE — 87640 STAPH A DNA AMP PROBE: CPT

## 2022-12-08 PROCEDURE — 70450 CT HEAD/BRAIN W/O DYE: CPT | Mod: MA

## 2022-12-08 PROCEDURE — 84484 ASSAY OF TROPONIN QUANT: CPT

## 2022-12-08 PROCEDURE — 83550 IRON BINDING TEST: CPT

## 2022-12-08 PROCEDURE — 84480 ASSAY TRIIODOTHYRONINE (T3): CPT

## 2022-12-08 PROCEDURE — 81003 URINALYSIS AUTO W/O SCOPE: CPT

## 2022-12-08 PROCEDURE — 99285 EMERGENCY DEPT VISIT HI MDM: CPT

## 2022-12-08 PROCEDURE — 83935 ASSAY OF URINE OSMOLALITY: CPT

## 2022-12-08 PROCEDURE — 36415 COLL VENOUS BLD VENIPUNCTURE: CPT

## 2022-12-08 PROCEDURE — 82140 ASSAY OF AMMONIA: CPT

## 2022-12-08 PROCEDURE — 84443 ASSAY THYROID STIM HORMONE: CPT

## 2022-12-08 PROCEDURE — 71045 X-RAY EXAM CHEST 1 VIEW: CPT

## 2022-12-08 PROCEDURE — 82272 OCCULT BLD FECES 1-3 TESTS: CPT

## 2022-12-08 PROCEDURE — 84466 ASSAY OF TRANSFERRIN: CPT

## 2022-12-08 PROCEDURE — 74177 CT ABD & PELVIS W/CONTRAST: CPT | Mod: MA

## 2022-12-08 PROCEDURE — 70553 MRI BRAIN STEM W/O & W/DYE: CPT

## 2022-12-08 PROCEDURE — 99239 HOSP IP/OBS DSCHRG MGMT >30: CPT

## 2022-12-08 PROCEDURE — 81001 URINALYSIS AUTO W/SCOPE: CPT

## 2022-12-08 PROCEDURE — 80053 COMPREHEN METABOLIC PANEL: CPT

## 2022-12-08 PROCEDURE — U0005: CPT

## 2022-12-08 PROCEDURE — 85610 PROTHROMBIN TIME: CPT

## 2022-12-08 PROCEDURE — 82728 ASSAY OF FERRITIN: CPT

## 2022-12-08 PROCEDURE — 84436 ASSAY OF TOTAL THYROXINE: CPT

## 2022-12-08 PROCEDURE — 80048 BASIC METABOLIC PNL TOTAL CA: CPT

## 2022-12-08 PROCEDURE — 85025 COMPLETE CBC W/AUTO DIFF WBC: CPT

## 2022-12-08 PROCEDURE — 86140 C-REACTIVE PROTEIN: CPT

## 2022-12-08 PROCEDURE — 99233 SBSQ HOSP IP/OBS HIGH 50: CPT

## 2022-12-08 PROCEDURE — 93005 ELECTROCARDIOGRAM TRACING: CPT

## 2022-12-08 PROCEDURE — U0003: CPT

## 2022-12-08 PROCEDURE — 83735 ASSAY OF MAGNESIUM: CPT

## 2022-12-08 PROCEDURE — 71260 CT THORAX DX C+: CPT | Mod: MA

## 2022-12-08 PROCEDURE — 83605 ASSAY OF LACTIC ACID: CPT

## 2022-12-08 PROCEDURE — 85027 COMPLETE CBC AUTOMATED: CPT

## 2022-12-08 PROCEDURE — 82962 GLUCOSE BLOOD TEST: CPT

## 2022-12-08 PROCEDURE — 83540 ASSAY OF IRON: CPT

## 2022-12-08 PROCEDURE — 84100 ASSAY OF PHOSPHORUS: CPT

## 2022-12-08 PROCEDURE — 87040 BLOOD CULTURE FOR BACTERIA: CPT

## 2022-12-08 PROCEDURE — 87641 MR-STAPH DNA AMP PROBE: CPT

## 2022-12-08 PROCEDURE — 82607 VITAMIN B-12: CPT

## 2022-12-08 RX ORDER — HALOPERIDOL DECANOATE 100 MG/ML
1 INJECTION INTRAMUSCULAR
Qty: 0 | Refills: 0 | DISCHARGE
Start: 2022-12-08

## 2022-12-08 RX ORDER — HALOPERIDOL DECANOATE 100 MG/ML
1 INJECTION INTRAMUSCULAR
Qty: 0 | Refills: 0 | DISCHARGE

## 2022-12-08 RX ORDER — SENNA PLUS 8.6 MG/1
2 TABLET ORAL
Qty: 0 | Refills: 0 | DISCHARGE
Start: 2022-12-08

## 2022-12-08 RX ORDER — HALOPERIDOL DECANOATE 100 MG/ML
5 INJECTION INTRAMUSCULAR ONCE
Refills: 0 | Status: DISCONTINUED | OUTPATIENT
Start: 2022-12-08 | End: 2022-12-08

## 2022-12-08 RX ORDER — IBUPROFEN 200 MG
1 TABLET ORAL
Qty: 0 | Refills: 0 | DISCHARGE

## 2022-12-08 RX ORDER — HALOPERIDOL DECANOATE 100 MG/ML
5 INJECTION INTRAMUSCULAR EVERY 6 HOURS
Refills: 0 | Status: DISCONTINUED | OUTPATIENT
Start: 2022-12-08 | End: 2022-12-08

## 2022-12-08 RX ORDER — PANTOPRAZOLE SODIUM 20 MG/1
1 TABLET, DELAYED RELEASE ORAL
Qty: 0 | Refills: 0 | DISCHARGE

## 2022-12-08 RX ORDER — ACETAMINOPHEN 500 MG
2 TABLET ORAL
Qty: 0 | Refills: 0 | DISCHARGE

## 2022-12-08 RX ORDER — SODIUM CHLORIDE 9 MG/ML
1 INJECTION INTRAMUSCULAR; INTRAVENOUS; SUBCUTANEOUS
Qty: 0 | Refills: 0 | DISCHARGE
Start: 2022-12-08

## 2022-12-08 RX ORDER — TRAZODONE HCL 50 MG
0 TABLET ORAL
Qty: 0 | Refills: 0 | DISCHARGE

## 2022-12-08 RX ORDER — PANTOPRAZOLE SODIUM 20 MG/1
1 TABLET, DELAYED RELEASE ORAL
Qty: 0 | Refills: 0 | DISCHARGE
Start: 2022-12-08

## 2022-12-08 RX ORDER — CHOLECALCIFEROL (VITAMIN D3) 125 MCG
2000 CAPSULE ORAL
Qty: 0 | Refills: 0 | DISCHARGE
Start: 2022-12-08

## 2022-12-08 RX ORDER — POLYETHYLENE GLYCOL 3350 17 G/17G
17 POWDER, FOR SOLUTION ORAL
Qty: 0 | Refills: 0 | DISCHARGE
Start: 2022-12-08

## 2022-12-08 RX ORDER — ASCORBIC ACID 60 MG
1 TABLET,CHEWABLE ORAL
Qty: 0 | Refills: 0 | DISCHARGE
Start: 2022-12-08

## 2022-12-08 RX ORDER — BENZTROPINE MESYLATE 1 MG
1 TABLET ORAL
Qty: 0 | Refills: 0 | DISCHARGE
Start: 2022-12-08

## 2022-12-08 RX ADMIN — Medication 0.5 MILLIGRAM(S): at 06:36

## 2022-12-08 RX ADMIN — LISINOPRIL 2.5 MILLIGRAM(S): 2.5 TABLET ORAL at 06:35

## 2022-12-08 RX ADMIN — HALOPERIDOL DECANOATE 10 MILLIGRAM(S): 100 INJECTION INTRAMUSCULAR at 09:49

## 2022-12-08 RX ADMIN — HALOPERIDOL DECANOATE 5 MILLIGRAM(S): 100 INJECTION INTRAMUSCULAR at 18:54

## 2022-12-08 RX ADMIN — HALOPERIDOL DECANOATE 5 MILLIGRAM(S): 100 INJECTION INTRAMUSCULAR at 03:22

## 2022-12-08 RX ADMIN — CHLORHEXIDINE GLUCONATE 1 APPLICATION(S): 213 SOLUTION TOPICAL at 12:20

## 2022-12-08 RX ADMIN — PANTOPRAZOLE SODIUM 40 MILLIGRAM(S): 20 TABLET, DELAYED RELEASE ORAL at 06:34

## 2022-12-08 RX ADMIN — SENNA PLUS 2 TABLET(S): 8.6 TABLET ORAL at 21:00

## 2022-12-08 RX ADMIN — Medication 0.5 MILLIGRAM(S): at 18:25

## 2022-12-08 RX ADMIN — HALOPERIDOL DECANOATE 10 MILLIGRAM(S): 100 INJECTION INTRAMUSCULAR at 21:00

## 2022-12-08 RX ADMIN — SODIUM CHLORIDE 1 GRAM(S): 9 INJECTION INTRAMUSCULAR; INTRAVENOUS; SUBCUTANEOUS at 06:35

## 2022-12-08 RX ADMIN — HEPARIN SODIUM 5000 UNIT(S): 5000 INJECTION INTRAVENOUS; SUBCUTANEOUS at 18:25

## 2022-12-08 RX ADMIN — SODIUM CHLORIDE 1 GRAM(S): 9 INJECTION INTRAMUSCULAR; INTRAVENOUS; SUBCUTANEOUS at 20:59

## 2022-12-08 RX ADMIN — Medication 1 MILLIGRAM(S): at 21:00

## 2022-12-08 RX ADMIN — Medication 2000 UNIT(S): at 13:05

## 2022-12-08 RX ADMIN — SODIUM CHLORIDE 1 GRAM(S): 9 INJECTION INTRAMUSCULAR; INTRAVENOUS; SUBCUTANEOUS at 13:25

## 2022-12-08 RX ADMIN — Medication 500 MILLIGRAM(S): at 13:00

## 2022-12-08 RX ADMIN — Medication 1 MILLIGRAM(S): at 13:25

## 2022-12-08 RX ADMIN — Medication 325 MILLIGRAM(S): at 13:00

## 2022-12-08 RX ADMIN — CHLORHEXIDINE GLUCONATE 1 APPLICATION(S): 213 SOLUTION TOPICAL at 13:05

## 2022-12-08 RX ADMIN — HEPARIN SODIUM 5000 UNIT(S): 5000 INJECTION INTRAVENOUS; SUBCUTANEOUS at 06:34

## 2022-12-08 RX ADMIN — Medication 1 MILLIGRAM(S): at 06:34

## 2022-12-08 RX ADMIN — Medication 2 MILLIGRAM(S): at 18:35

## 2022-12-08 NOTE — BH CONSULTATION LIAISON PROGRESS NOTE - NSBHASSESSMENTFT_PSY_ALL_CORE
Patient is a 51 year old male who resides with his wife and 13 y/o son, with a past psychiatric history of schizoaffective disorder depressive type, following with [facility] and with no history of substance abuse and with a PMH of breast CA / Lymphoma and HTN who is admitted to [facility] for treatment of psychosis who sent patient to [facility] for medical work up s/p fall.     Patient seen and evaluated and currently presents grossly disorganized, tangential and with delusions of paranoia. Medical and cardiology recs appreciated, no signs of metastatic disease and cleared by cardiology . 2PC transfer back to [facility] once bed becomes available      Discussed yesterday with [facility] who. Concern for possible MRSA and need of additional CHemotherapy   Discussed with primary team and oncology today. No active MRSA infection and as per Oncology, no indication to give chemotherapy and cleared to return to [facility].     Recs   -recommend to keep on 1 to 1 observation for elopement risk and impulsivity   -Continue Haldol to 10mg PO BID   -Cogentin 0.5mg PO BID   -Ativan 1mg PO TID   -If patient gets agitated , recommend to give Haldol 5mg and Ativan 2mg together IM Q6 hours PRN for agitation   -2PC admission to inpt psychiatry once medically cleared   -Will follow
Patient is a 51 year old male who resides with his wife and 11 y/o son, with a past psychiatric history of schizoaffective disorder depressive type, following with [facility] and with no history of substance abuse and with a PMH of breast CA / Lymphoma and HTN who is admitted to [facility] for treatment of psychosis who sent patient to [facility] for medical work up s/p fall.     Patient seen and evaluated and currently presents grossly disorganized, tangential and with delusions of paranoia. Medical and cardiology recs appreciated, no signs of metastatic disease and cleared by cardiology. Patient not fully cooperating with PT. once cleared by PT, 2PC transfer back to [facility] once medically cleared     11/30: patient seen for follow up and found to be disorganized, delusional and in bilateral wrist restraint s/p overnight agitation with PRN. If still agitated and requiring restraints, recommend to give both Haldol and ativan PRN together in attempt to treat agitation to get patient off restraints.       Recs   -recommend to keep on 1 to 1 observation for elopement risk and impulsivity   -Haldol 5mg po daily and 10mg QHS  -Cogentin 0.5mg PO BID   -Ativan 1mg PO TID   -If patient gets agitated , recommend to give Haldol 5mg and Ativan 2mg together IM Q6 hours PRN for agitation   -2PC admission to inpt psychiatry once medically cleared   -Will follow
Patient is a 51 year old male who resides with his wife and 11 y/o son, with a past psychiatric history of schizoaffective disorder depressive type, following with [facility] and with no history of substance abuse and with a PMH of breast CA / Lymphoma and HTN who is admitted to [facility] for treatment of psychosis who sent patient to [facility] for medical work up s/p fall.     Patient seen and evaluated and currently presents grossly disorganized, tangential and with delusions of paranoia. Medical and cardiology recs appreciated, no signs of metastatic disease and cleared by cardiology . 2PC transfer back to [facility] once bed becomes available      Discussed yesterday with [facility] who. Concern for possible MRSA and need of additional CHemotherapy   Discussed with primary team and oncology today. No active MRSA infection and as per Oncology, no indication to give chemotherapy and cleared to return to [facility].     Recs   -recommend to keep on 1 to 1 observation for elopement risk and impulsivity   -Continue Haldol to 10mg PO BID   -Cogentin 0.5mg PO BID   -Ativan 1mg PO TID   -If patient gets agitated , recommend to give Haldol 5mg and Ativan 2mg together IM Q6 hours PRN for agitation   -2PC admission to inpt psychiatry once medically cleared   -Will follow
Patient is a 51 year old male who resides with his wife and 13 y/o son, with a past psychiatric history of schizoaffective disorder depressive type, following with [facility] and with no history of substance abuse and with a PMH of breast CA / Lymphoma and HTN who is admitted to [facility] for treatment of psychosis who sent patient to [facility] for medical work up s/p fall.     Patient seen and evaluated and currently presents grossly disorganized, tangential and with delusions of paranoia. Medical and cardiology recs appreciated, no signs of metastatic disease and cleared by cardiology . 2PC transfer back to [facility] once bed becomes available         Recs   -recommend to keep on 1 to 1 observation for elopement risk and impulsivity   -Recommend to titrate Haldol to 10mg PO BID   -Cogentin 0.5mg PO BID   -Ativan 1mg PO TID   -If patient gets agitated , recommend to give Haldol 5mg and Ativan 2mg together IM Q6 hours PRN for agitation   -2PC admission to inpt psychiatry once medically cleared   -Will follow
Patient is a 51 year old male who resides with his wife and 13 y/o son, with a past psychiatric history of schizoaffective disorder depressive type, following with [facility] and with no history of substance abuse and with a PMH of breast CA / Lymphoma and HTN who is admitted to [facility] for treatment of psychosis who sent patient to [facility] for medical work up s/p fall.     Patient seen and evaluated and currently presents grossly disorganized, tangential and with delusions of paranoia. Medical and cardiology recs appreciated, no signs of metastatic disease and cleared by cardiology . 2PC transfer back to [facility] once bed becomes available         Recs   -recommend to keep on 1 to 1 observation for elopement risk and impulsivity   -Recommend to titrate Haldol to 10mg PO BID   -Cogentin 0.5mg PO BID   -Ativan 1mg PO TID   -If patient gets agitated , recommend to give Haldol 5mg and Ativan 2mg together IM Q6 hours PRN for agitation   -2PC admission to inpt psychiatry once medically cleared   -Will follow
Patient is a 51 year old male who resides with his wife and 13 y/o son, with a past psychiatric history of schizoaffective disorder depressive type, following with [facility] and with no history of substance abuse and with a PMH of breast CA / Lymphoma and HTN who is admitted to [facility] for treatment of psychosis who sent patient to [facility] for medical work up s/p fall.     Patient seen and evaluated and currently presents grossly disorganized, tangential and with delusions of paranoia. Medical and cardiology recs appreciated, no signs of metastatic disease and cleared by cardiology . 2PC transfer back to [facility] once bed becomes available         Recs   -recommend to keep on 1 to 1 observation for elopement risk and impulsivity   -Haldol 5mg po daily and 10mg QHS  -Cogentin 0.5mg PO BID   -Ativan 1mg PO TID   -If patient gets agitated , recommend to give Haldol 5mg and Ativan 2mg together IM Q6 hours PRN for agitation   -2PC admission to inpt psychiatry once medically cleared   -Will follow
Patient is a 51 year old male who resides with his wife and 11 y/o son, with a past psychiatric history of schizoaffective disorder depressive type, following with [facility] and with no history of substance abuse and with a PMH of breast CA / Lymphoma and HTN who is admitted to [facility] for treatment of psychosis who sent patient to [facility] for medical work up s/p fall.     Patient seen and evaluated and currently presents grossly disorganized, tangential and with delusions of paranoia. Medical and cardiology recs appreciated, no signs of metastatic disease and cleared by cardiology. Patient not fully cooperating with PT. once cleared by PT, 2PC transfer back to [facility] once medically cleared       Recs   -recommend to keep on 1 to 1 observation for elopement risk and impulsivity   -Haldol 5mg po daily and 10mg QHS  -Cogentin 0.5mg PO BID   -Ativan 1mg PO TID   -If patient gets agitated , recommend to give Haldol 5mg and Ativan 2mg together IM Q6 hours PRN for agitation   -2PC admission to inpt psychiatry once medically cleared   -Will follow

## 2022-12-08 NOTE — BH CONSULTATION LIAISON PROGRESS NOTE - NSBHCHARTREVIEWLAB_PSY_A_CORE FT
Basic Metabolic Panel (09.10.22 @ 07:40)    Sodium, Serum: 137 mmol/L    Potassium, Serum: 4.3 mmol/L    Chloride, Serum: 103 mmol/L    Carbon Dioxide, Serum: 23.0 mmol/L    Anion Gap, Serum: 11 mmol/L    Blood Urea Nitrogen, Serum: 9.7 mg/dL    Creatinine, Serum: 0.90 mg/dL    Glucose, Serum: 99 mg/dL    Calcium, Total Serum: 8.8: Prior Reference Range of 8.6 – 10.2 was amended as of 7/26/2022 to 8.4 –  10.5. mg/dL    eGFR: 103: The estimated glomerular filtration rate (eGFR) is calculated using the  2021 CKD-EPI creatinine equation, which does not have a coefficient for  race and is validated in individuals 18 years of age and older (N Engl J  Med 2021; 385:2035-8663). Creatinine-based eGFR may be inaccurate in  various situations including but not limited to extremes of muscle mass,  altered dietary protein intake, or medications that affect renal tubular  creatinine secretion. mL/min/1.73m2  
Basic Metabolic Panel (09.10.22 @ 07:40)    Sodium, Serum: 137 mmol/L    Potassium, Serum: 4.3 mmol/L    Chloride, Serum: 103 mmol/L    Carbon Dioxide, Serum: 23.0 mmol/L    Anion Gap, Serum: 11 mmol/L    Blood Urea Nitrogen, Serum: 9.7 mg/dL    Creatinine, Serum: 0.90 mg/dL    Glucose, Serum: 99 mg/dL    Calcium, Total Serum: 8.8: Prior Reference Range of 8.6 – 10.2 was amended as of 7/26/2022 to 8.4 –  10.5. mg/dL    eGFR: 103: The estimated glomerular filtration rate (eGFR) is calculated using the  2021 CKD-EPI creatinine equation, which does not have a coefficient for  race and is validated in individuals 18 years of age and older (N Engl J  Med 2021; 385:2053-4301). Creatinine-based eGFR may be inaccurate in  various situations including but not limited to extremes of muscle mass,  altered dietary protein intake, or medications that affect renal tubular  creatinine secretion. mL/min/1.73m2  
Basic Metabolic Panel (09.10.22 @ 07:40)    Sodium, Serum: 137 mmol/L    Potassium, Serum: 4.3 mmol/L    Chloride, Serum: 103 mmol/L    Carbon Dioxide, Serum: 23.0 mmol/L    Anion Gap, Serum: 11 mmol/L    Blood Urea Nitrogen, Serum: 9.7 mg/dL    Creatinine, Serum: 0.90 mg/dL    Glucose, Serum: 99 mg/dL    Calcium, Total Serum: 8.8: Prior Reference Range of 8.6 – 10.2 was amended as of 7/26/2022 to 8.4 –  10.5. mg/dL    eGFR: 103: The estimated glomerular filtration rate (eGFR) is calculated using the  2021 CKD-EPI creatinine equation, which does not have a coefficient for  race and is validated in individuals 18 years of age and older (N Engl J  Med 2021; 385:4650-7317). Creatinine-based eGFR may be inaccurate in  various situations including but not limited to extremes of muscle mass,  altered dietary protein intake, or medications that affect renal tubular  creatinine secretion. mL/min/1.73m2  
Basic Metabolic Panel (09.10.22 @ 07:40)    Sodium, Serum: 137 mmol/L    Potassium, Serum: 4.3 mmol/L    Chloride, Serum: 103 mmol/L    Carbon Dioxide, Serum: 23.0 mmol/L    Anion Gap, Serum: 11 mmol/L    Blood Urea Nitrogen, Serum: 9.7 mg/dL    Creatinine, Serum: 0.90 mg/dL    Glucose, Serum: 99 mg/dL    Calcium, Total Serum: 8.8: Prior Reference Range of 8.6 – 10.2 was amended as of 7/26/2022 to 8.4 –  10.5. mg/dL    eGFR: 103: The estimated glomerular filtration rate (eGFR) is calculated using the  2021 CKD-EPI creatinine equation, which does not have a coefficient for  race and is validated in individuals 18 years of age and older (N Engl J  Med 2021; 385:2448-5376). Creatinine-based eGFR may be inaccurate in  various situations including but not limited to extremes of muscle mass,  altered dietary protein intake, or medications that affect renal tubular  creatinine secretion. mL/min/1.73m2  
Basic Metabolic Panel (09.10.22 @ 07:40)    Sodium, Serum: 137 mmol/L    Potassium, Serum: 4.3 mmol/L    Chloride, Serum: 103 mmol/L    Carbon Dioxide, Serum: 23.0 mmol/L    Anion Gap, Serum: 11 mmol/L    Blood Urea Nitrogen, Serum: 9.7 mg/dL    Creatinine, Serum: 0.90 mg/dL    Glucose, Serum: 99 mg/dL    Calcium, Total Serum: 8.8: Prior Reference Range of 8.6 – 10.2 was amended as of 7/26/2022 to 8.4 –  10.5. mg/dL    eGFR: 103: The estimated glomerular filtration rate (eGFR) is calculated using the  2021 CKD-EPI creatinine equation, which does not have a coefficient for  race and is validated in individuals 18 years of age and older (N Engl J  Med 2021; 385:9734-3166). Creatinine-based eGFR may be inaccurate in  various situations including but not limited to extremes of muscle mass,  altered dietary protein intake, or medications that affect renal tubular  creatinine secretion. mL/min/1.73m2  
Basic Metabolic Panel (09.10.22 @ 07:40)    Sodium, Serum: 137 mmol/L    Potassium, Serum: 4.3 mmol/L    Chloride, Serum: 103 mmol/L    Carbon Dioxide, Serum: 23.0 mmol/L    Anion Gap, Serum: 11 mmol/L    Blood Urea Nitrogen, Serum: 9.7 mg/dL    Creatinine, Serum: 0.90 mg/dL    Glucose, Serum: 99 mg/dL    Calcium, Total Serum: 8.8: Prior Reference Range of 8.6 – 10.2 was amended as of 7/26/2022 to 8.4 –  10.5. mg/dL    eGFR: 103: The estimated glomerular filtration rate (eGFR) is calculated using the  2021 CKD-EPI creatinine equation, which does not have a coefficient for  race and is validated in individuals 18 years of age and older (N Engl J  Med 2021; 385:1183-1037). Creatinine-based eGFR may be inaccurate in  various situations including but not limited to extremes of muscle mass,  altered dietary protein intake, or medications that affect renal tubular  creatinine secretion. mL/min/1.73m2  
Basic Metabolic Panel (09.10.22 @ 07:40)    Sodium, Serum: 137 mmol/L    Potassium, Serum: 4.3 mmol/L    Chloride, Serum: 103 mmol/L    Carbon Dioxide, Serum: 23.0 mmol/L    Anion Gap, Serum: 11 mmol/L    Blood Urea Nitrogen, Serum: 9.7 mg/dL    Creatinine, Serum: 0.90 mg/dL    Glucose, Serum: 99 mg/dL    Calcium, Total Serum: 8.8: Prior Reference Range of 8.6 – 10.2 was amended as of 7/26/2022 to 8.4 –  10.5. mg/dL    eGFR: 103: The estimated glomerular filtration rate (eGFR) is calculated using the  2021 CKD-EPI creatinine equation, which does not have a coefficient for  race and is validated in individuals 18 years of age and older (N Engl J  Med 2021; 385:7663[address]3834). Creatinine-based eGFR may be inaccurate in  various situations including but not limited to extremes of muscle mass,  altered dietary protein intake, or medications that affect renal tubular  creatinine secretion. mL/min/1.73m2

## 2022-12-08 NOTE — BH CONSULTATION LIAISON PROGRESS NOTE - NSBHINDICATION_PSY_ALL_CORE
elopement risk and impulsivity 

## 2022-12-08 NOTE — BH CONSULTATION LIAISON PROGRESS NOTE - NSBHFUPINTERVALCCFT_PSY_A_CORE
im the almighty 
im ok 
'    " patient non verbal and sedated 
I am god
my son 
im the almighty 
im fantastic

## 2022-12-08 NOTE — PROGRESS NOTE ADULT - SUBJECTIVE AND OBJECTIVE BOX
[facility]    SUBJECTIVE / OVERNIGHT EVENTS:  agitation, confusion, disoriented, disorganized thoughts      No acute events: but remains irrational   denies any pain, urinating without any issues,           Vital Signs Last 24 Hrs  T(C): 36.9 (08 Dec 2022 09:31), Max: 36.9 (08 Dec 2022 09:31)  T(F): 98.4 (08 Dec 2022 09:31), Max: 98.4 (08 Dec 2022 09:31)  HR: 92 (08 Dec 2022 09:31) (81 - 92)  BP: 105/64 (08 Dec 2022 09:31) (105/64 - 108/74)  BP(mean): --  RR: 18 (08 Dec 2022 09:31) (18 - 18)  SpO2: 99% (08 Dec 2022 09:31) (98% - 99%)    Parameters below as of 08 Dec 2022 09:31  Patient On (Oxygen Delivery Method): room air        Limited physical exam due to mental state, abusive language   CONSTITUTIONAL: NAD, well-developed, comfortable in bed, awake  RESPIRATORY: Normal respiratory effort;   MUSCLOSKELETAL:  No clubbing or cyanosis of digits  PSYCH:  +delusional, abusive, disorganized speech   NEUROLOGY: CN 2-12 are intact and symmetric; moving all extremities    SKIN: No rashes            LABS:                        MEDICATIONS  (STANDING):  ascorbic acid 500 milliGRAM(s) Oral daily  benztropine 0.5 milliGRAM(s) Oral every 12 hours  chlorhexidine 2% Cloths 1 Application(s) Topical daily  cholecalciferol 2000 Unit(s) Oral daily  ferrous    sulfate 325 milliGRAM(s) Oral daily  haloperidol     Tablet 10 milliGRAM(s) Oral <User Schedule>  haloperidol     Tablet 10 milliGRAM(s) Oral <User Schedule>  heparin   Injectable 5000 Unit(s) SubCutaneous every 12 hours  lisinopril 2.5 milliGRAM(s) Oral daily  LORazepam     Tablet 1 milliGRAM(s) Oral three times a day  mupirocin 2% Nasal 1 Application(s) Both Nostrils two times a day  pantoprazole    Tablet 40 milliGRAM(s) Oral before breakfast  polyethylene glycol 3350 17 Gram(s) Oral daily  senna 2 Tablet(s) Oral at bedtime  sodium chloride 1 Gram(s) Oral three times a day  sodium chloride 0.9%. 1000 milliLiter(s) (100 mL/Hr) IV Continuous <Continuous>    MEDICATIONS  (PRN):  acetaminophen     Tablet .. 650 milliGRAM(s) Oral every 6 hours PRN Mild Pain (1 - 3), Moderate Pain (4 - 6)  guaiFENesin Oral Liquid (Sugar-Free) 200 milliGRAM(s) Oral every 6 hours PRN Cough  haloperidol     Tablet 5 milliGRAM(s) Oral every 6 hours PRN agitation          RADIOLOGY & ADDITIONAL TESTS:  Results Reviewed:   Imaging Personally Reviewed:  Electrocardiogram Personally Reviewed:

## 2022-12-08 NOTE — BH CONSULTATION LIAISON PROGRESS NOTE - OTHER
not formally assessed 

## 2022-12-08 NOTE — BH CONSULTATION LIAISON PROGRESS NOTE - NSBHCHARTREVIEWVS_PSY_A_CORE FT
Vital Signs Last 24 Hrs  T(C): 36.7 (01 Dec 2022 09:44), Max: 36.9 (30 Nov 2022 20:57)  T(F): 98 (01 Dec 2022 09:44), Max: 98.5 (30 Nov 2022 20:57)  HR: 62 (01 Dec 2022 09:44) (62 - 96)  BP: 110/67 (01 Dec 2022 09:44) (110/67 - 139/67)  BP(mean): --  RR: 18 (01 Dec 2022 09:44) (18 - 18)  SpO2: 98% (01 Dec 2022 09:44) (98% - 98%)    Parameters below as of 01 Dec 2022 09:44  Patient On (Oxygen Delivery Method): room air    
Vital Signs Last 24 Hrs  T(C): 37.1 (30 Nov 2022 05:44), Max: 37.2 (29 Nov 2022 17:00)  T(F): 98.8 (30 Nov 2022 05:44), Max: 99 (29 Nov 2022 17:00)  HR: 115 (30 Nov 2022 05:44) (81 - 115)  BP: 112/67 (30 Nov 2022 05:44) (101/58 - 118/76)  BP(mean): --  RR: 20 (30 Nov 2022 05:44) (17 - 20)  SpO2: 96% (30 Nov 2022 05:44) (96% - 98%)    Parameters below as of 30 Nov 2022 05:44  Patient On (Oxygen Delivery Method): room air    
Vital Signs Last 24 Hrs  T(C): 36.8 (02 Dec 2022 12:00), Max: 37 (01 Dec 2022 20:30)  T(F): 98.3 (02 Dec 2022 12:00), Max: 98.6 (01 Dec 2022 20:30)  HR: 84 (02 Dec 2022 16:00) (82 - 109)  BP: 106/70 (02 Dec 2022 16:00) (106/70 - 135/94)  BP(mean): 79 (02 Dec 2022 16:00) (79 - 107)  RR: 18 (02 Dec 2022 16:00) (14 - 26)  SpO2: 96% (02 Dec 2022 16:00) (96% - 100%)    Parameters below as of 02 Dec 2022 16:00  Patient On (Oxygen Delivery Method): room air    
Vital Signs Last 24 Hrs  T(C): 36.8 (29 Nov 2022 12:56), Max: 37.5 (28 Nov 2022 19:34)  T(F): 98.3 (29 Nov 2022 12:56), Max: 99.5 (28 Nov 2022 19:34)  HR: 98 (29 Nov 2022 12:56) (95 - 107)  BP: 118/76 (29 Nov 2022 12:56) (117/62 - 153/95)  BP(mean): --  RR: 17 (29 Nov 2022 12:56) (17 - 18)  SpO2: 96% (29 Nov 2022 12:56) (95% - 97%)    Parameters below as of 29 Nov 2022 12:56  Patient On (Oxygen Delivery Method): room air    
Vital Signs Last 24 Hrs  T(C): 36.8 (07 Dec 2022 10:06), Max: 36.8 (07 Dec 2022 10:06)  T(F): 98.2 (07 Dec 2022 10:06), Max: 98.2 (07 Dec 2022 10:06)  HR: 84 (07 Dec 2022 10:06) (68 - 84)  BP: 108/70 (07 Dec 2022 10:06) (105/69 - 114/73)  BP(mean): --  RR: 16 (07 Dec 2022 10:06) (16 - 18)  SpO2: 100% (07 Dec 2022 10:06) (96% - 100%)    Parameters below as of 07 Dec 2022 10:06  Patient On (Oxygen Delivery Method): room air    
Vital Signs Last 24 Hrs  T(C): 36.8 (08 Dec 2022 16:12), Max: 36.9 (08 Dec 2022 09:31)  T(F): 98.3 (08 Dec 2022 16:12), Max: 98.4 (08 Dec 2022 09:31)  HR: 74 (08 Dec 2022 16:12) (74 - 92)  BP: 108/69 (08 Dec 2022 16:12) (105/64 - 108/74)  BP(mean): --  RR: 18 (08 Dec 2022 16:12) (18 - 18)  SpO2: 98% (08 Dec 2022 16:12) (98% - 99%)    Parameters below as of 08 Dec 2022 16:12  Patient On (Oxygen Delivery Method): room air    
Vital Signs Last 24 Hrs  T(C): 37 (05 Dec 2022 15:34), Max: 37 (05 Dec 2022 15:34)  T(F): 98.6 (05 Dec 2022 15:34), Max: 98.6 (05 Dec 2022 15:34)  HR: 99 (05 Dec 2022 15:34) (96 - 99)  BP: 136/74 (05 Dec 2022 15:34) (126/82 - 136/74)  BP(mean): --  RR: 19 (05 Dec 2022 15:34) (18 - 19)  SpO2: 98% (05 Dec 2022 15:34) (97% - 98%)    Parameters below as of 05 Dec 2022 15:34  Patient On (Oxygen Delivery Method): room air

## 2022-12-08 NOTE — BH CONSULTATION LIAISON PROGRESS NOTE - NSBHMSETHTPROC_PSY_A_CORE
Disorganized/Tangential/Perseverative

## 2022-12-08 NOTE — BH CONSULTATION LIAISON PROGRESS NOTE - CURRENT MEDICATION
MEDICATIONS  (STANDING):  ascorbic acid 500 milliGRAM(s) Oral daily  benztropine 0.5 milliGRAM(s) Oral every 12 hours  cholecalciferol 2000 Unit(s) Oral daily  ferrous    sulfate 325 milliGRAM(s) Oral daily  haloperidol     Tablet 5 milliGRAM(s) Oral <User Schedule>  haloperidol     Tablet 10 milliGRAM(s) Oral <User Schedule>  heparin   Injectable 5000 Unit(s) SubCutaneous every 12 hours  lisinopril 2.5 milliGRAM(s) Oral daily  LORazepam     Tablet 1 milliGRAM(s) Oral three times a day  pantoprazole    Tablet 40 milliGRAM(s) Oral before breakfast  polyethylene glycol 3350 17 Gram(s) Oral daily  senna 2 Tablet(s) Oral at bedtime  sodium chloride 1 Gram(s) Oral three times a day  sodium chloride 0.9%. 1000 milliLiter(s) (100 mL/Hr) IV Continuous <Continuous>    MEDICATIONS  (PRN):  acetaminophen     Tablet .. 650 milliGRAM(s) Oral every 6 hours PRN Mild Pain (1 - 3), Moderate Pain (4 - 6)  guaiFENesin Oral Liquid (Sugar-Free) 200 milliGRAM(s) Oral every 6 hours PRN Cough  haloperidol     Tablet 5 milliGRAM(s) Oral every 6 hours PRN agitation  haloperidol    Injectable 5 milliGRAM(s) IntraMuscular every 6 hours PRN Agitation  LORazepam   Injectable 2 milliGRAM(s) IntraMuscular every 6 hours PRN Agitation  
MEDICATIONS  (STANDING):  ascorbic acid 500 milliGRAM(s) Oral daily  benztropine 0.5 milliGRAM(s) Oral every 12 hours  cholecalciferol 2000 Unit(s) Oral daily  diazepam  Injectable 10 milliGRAM(s) IV Push every 6 hours  ferrous    sulfate 325 milliGRAM(s) Oral daily  haloperidol     Tablet 10 milliGRAM(s) Oral <User Schedule>  haloperidol     Tablet 10 milliGRAM(s) Oral <User Schedule>  heparin   Injectable 5000 Unit(s) SubCutaneous every 12 hours  lisinopril 2.5 milliGRAM(s) Oral daily  LORazepam     Tablet 1 milliGRAM(s) Oral three times a day  pantoprazole    Tablet 40 milliGRAM(s) Oral before breakfast  polyethylene glycol 3350 17 Gram(s) Oral daily  senna 2 Tablet(s) Oral at bedtime  sodium chloride 1 Gram(s) Oral three times a day  sodium chloride 0.9%. 1000 milliLiter(s) (100 mL/Hr) IV Continuous <Continuous>    MEDICATIONS  (PRN):  acetaminophen     Tablet .. 650 milliGRAM(s) Oral every 6 hours PRN Mild Pain (1 - 3), Moderate Pain (4 - 6)  guaiFENesin Oral Liquid (Sugar-Free) 200 milliGRAM(s) Oral every 6 hours PRN Cough  haloperidol     Tablet 5 milliGRAM(s) Oral every 6 hours PRN agitation  LORazepam   Injectable 2 milliGRAM(s) IntraMuscular every 6 hours PRN Agitation  
MEDICATIONS  (STANDING):  ascorbic acid 500 milliGRAM(s) Oral daily  benztropine 0.5 milliGRAM(s) Oral every 12 hours  cholecalciferol 2000 Unit(s) Oral daily  cyanocobalamin Injectable 1000 MICROGram(s) SubCutaneous daily  ferrous    sulfate 325 milliGRAM(s) Oral daily  haloperidol     Tablet 5 milliGRAM(s) Oral <User Schedule>  haloperidol     Tablet 10 milliGRAM(s) Oral <User Schedule>  heparin   Injectable 5000 Unit(s) SubCutaneous every 12 hours  lisinopril 2.5 milliGRAM(s) Oral daily  LORazepam     Tablet 1 milliGRAM(s) Oral three times a day  pantoprazole    Tablet 40 milliGRAM(s) Oral before breakfast  polyethylene glycol 3350 17 Gram(s) Oral daily  senna 2 Tablet(s) Oral at bedtime  sodium chloride 1 Gram(s) Oral three times a day  sodium chloride 0.9%. 1000 milliLiter(s) (100 mL/Hr) IV Continuous <Continuous>    MEDICATIONS  (PRN):  acetaminophen     Tablet .. 650 milliGRAM(s) Oral every 6 hours PRN Mild Pain (1 - 3), Moderate Pain (4 - 6)  guaiFENesin Oral Liquid (Sugar-Free) 200 milliGRAM(s) Oral every 6 hours PRN Cough  haloperidol     Tablet 5 milliGRAM(s) Oral every 6 hours PRN agitation  LORazepam     Tablet 2 milliGRAM(s) Oral every 6 hours PRN Anxiety  LORazepam   Injectable 2 milliGRAM(s) IntraMuscular every 6 hours PRN Agitation  
MEDICATIONS  (STANDING):  ascorbic acid 500 milliGRAM(s) Oral daily  benztropine 0.5 milliGRAM(s) Oral every 12 hours  cholecalciferol 2000 Unit(s) Oral daily  ferrous    sulfate 325 milliGRAM(s) Oral daily  haloperidol     Tablet 5 milliGRAM(s) Oral <User Schedule>  haloperidol     Tablet 10 milliGRAM(s) Oral <User Schedule>  heparin   Injectable 5000 Unit(s) SubCutaneous every 12 hours  lisinopril 2.5 milliGRAM(s) Oral daily  LORazepam     Tablet 1 milliGRAM(s) Oral three times a day  pantoprazole    Tablet 40 milliGRAM(s) Oral before breakfast  polyethylene glycol 3350 17 Gram(s) Oral daily  senna 2 Tablet(s) Oral at bedtime  sodium chloride 1 Gram(s) Oral three times a day  sodium chloride 0.9%. 1000 milliLiter(s) (100 mL/Hr) IV Continuous <Continuous>    MEDICATIONS  (PRN):  acetaminophen     Tablet .. 650 milliGRAM(s) Oral every 6 hours PRN Mild Pain (1 - 3), Moderate Pain (4 - 6)  guaiFENesin Oral Liquid (Sugar-Free) 200 milliGRAM(s) Oral every 6 hours PRN Cough  haloperidol     Tablet 5 milliGRAM(s) Oral every 6 hours PRN agitation  haloperidol    Injectable 5 milliGRAM(s) IntraMuscular every 6 hours PRN Agitation  LORazepam   Injectable 2 milliGRAM(s) IntraMuscular every 6 hours PRN Agitation  
MEDICATIONS  (STANDING):  ascorbic acid 500 milliGRAM(s) Oral daily  benztropine 0.5 milliGRAM(s) Oral every 12 hours  chlorhexidine 2% Cloths 1 Application(s) Topical daily  cholecalciferol 2000 Unit(s) Oral daily  ferrous    sulfate 325 milliGRAM(s) Oral daily  haloperidol     Tablet 10 milliGRAM(s) Oral <User Schedule>  haloperidol     Tablet 10 milliGRAM(s) Oral <User Schedule>  heparin   Injectable 5000 Unit(s) SubCutaneous every 12 hours  lisinopril 2.5 milliGRAM(s) Oral daily  LORazepam     Tablet 1 milliGRAM(s) Oral three times a day  pantoprazole    Tablet 40 milliGRAM(s) Oral before breakfast  polyethylene glycol 3350 17 Gram(s) Oral daily  senna 2 Tablet(s) Oral at bedtime  sodium chloride 1 Gram(s) Oral three times a day  sodium chloride 0.9%. 1000 milliLiter(s) (100 mL/Hr) IV Continuous <Continuous>    MEDICATIONS  (PRN):  acetaminophen     Tablet .. 650 milliGRAM(s) Oral every 6 hours PRN Mild Pain (1 - 3), Moderate Pain (4 - 6)  guaiFENesin Oral Liquid (Sugar-Free) 200 milliGRAM(s) Oral every 6 hours PRN Cough  haloperidol     Tablet 5 milliGRAM(s) Oral every 6 hours PRN agitation  haloperidol    Injectable 5 milliGRAM(s) IntraMuscular every 6 hours PRN Agitation  LORazepam   Injectable 2 milliGRAM(s) IntraMuscular every 6 hours PRN Agitation  
MEDICATIONS  (STANDING):  ascorbic acid 500 milliGRAM(s) Oral daily  benztropine 0.5 milliGRAM(s) Oral every 12 hours  chlorhexidine 2% Cloths 1 Application(s) Topical daily  cholecalciferol 2000 Unit(s) Oral daily  ferrous    sulfate 325 milliGRAM(s) Oral daily  haloperidol     Tablet 10 milliGRAM(s) Oral <User Schedule>  haloperidol     Tablet 10 milliGRAM(s) Oral <User Schedule>  heparin   Injectable 5000 Unit(s) SubCutaneous every 12 hours  lisinopril 2.5 milliGRAM(s) Oral daily  LORazepam     Tablet 1 milliGRAM(s) Oral three times a day  pantoprazole    Tablet 40 milliGRAM(s) Oral before breakfast  polyethylene glycol 3350 17 Gram(s) Oral daily  senna 2 Tablet(s) Oral at bedtime  sodium chloride 1 Gram(s) Oral three times a day  sodium chloride 0.9%. 1000 milliLiter(s) (100 mL/Hr) IV Continuous <Continuous>    MEDICATIONS  (PRN):  acetaminophen     Tablet .. 650 milliGRAM(s) Oral every 6 hours PRN Mild Pain (1 - 3), Moderate Pain (4 - 6)  guaiFENesin Oral Liquid (Sugar-Free) 200 milliGRAM(s) Oral every 6 hours PRN Cough  haloperidol     Tablet 5 milliGRAM(s) Oral every 6 hours PRN agitation  LORazepam   Injectable 2 milliGRAM(s) IntraMuscular every 6 hours PRN Agitation  
MEDICATIONS  (STANDING):  ascorbic acid 500 milliGRAM(s) Oral daily  benztropine 0.5 milliGRAM(s) Oral every 12 hours  chlorhexidine 2% Cloths 1 Application(s) Topical daily  cholecalciferol 2000 Unit(s) Oral daily  ferrous    sulfate 325 milliGRAM(s) Oral daily  haloperidol     Tablet 10 milliGRAM(s) Oral <User Schedule>  haloperidol     Tablet 10 milliGRAM(s) Oral <User Schedule>  heparin   Injectable 5000 Unit(s) SubCutaneous every 12 hours  lisinopril 2.5 milliGRAM(s) Oral daily  LORazepam     Tablet 1 milliGRAM(s) Oral three times a day  mupirocin 2% Nasal 1 Application(s) Both Nostrils two times a day  pantoprazole    Tablet 40 milliGRAM(s) Oral before breakfast  polyethylene glycol 3350 17 Gram(s) Oral daily  senna 2 Tablet(s) Oral at bedtime  sodium chloride 1 Gram(s) Oral three times a day  sodium chloride 0.9%. 1000 milliLiter(s) (100 mL/Hr) IV Continuous <Continuous>    MEDICATIONS  (PRN):  acetaminophen     Tablet .. 650 milliGRAM(s) Oral every 6 hours PRN Mild Pain (1 - 3), Moderate Pain (4 - 6)  guaiFENesin Oral Liquid (Sugar-Free) 200 milliGRAM(s) Oral every 6 hours PRN Cough  haloperidol     Tablet 5 milliGRAM(s) Oral every 6 hours PRN agitation

## 2022-12-08 NOTE — BH CONSULTATION LIAISON PROGRESS NOTE - NSBHMSETHTCONTENT_PSY_A_CORE
Delusions/Obsessions

## 2022-12-08 NOTE — PROGRESS NOTE ADULT - ASSESSMENT
Patient is a 52 year old male with PMH of breast CA in remission, lymphoma, HTN, treatment-resistant schizoaffective disorder depressive type, sent in after a fall and recently with worsening disorganized behaviour. Was planned for transfer back to  but became agitated and transferred to Stepdown.     Disorganized behavior; history of Schizoaffective disorder with Agitation   -on constant observation   -continue haldol BID  -continue ativan 1 mg TID  -continue cogentin  -Valium IV prn   Psychiatry recs   -eventual return to [facility]     Confusion likely due to worsening underlying psych disease  CTH and MRI negative.  B12 low/normal - continue injections as ordered  TSH and ammonia level negative, UA negative    Frequent Falls   Unclear if this is due to psychological illness   patient able to ambulate without assistance when he desires  CT head/ neck no acute findings. MRI brain also negative.  Acute minimally displaced right lateral seventh rib fracture. Healing distal left rib fractures.   fall precautions  Uncooperative with PT evaluation but was a code flight overnight and ambulating without any acute issues    History of breast cancer.- MRI brain  negative for mets  discussed with Dr Pinto's PA - Daniel and Dr Yusuf   no further plans for chemotherapy - can follow up with Dr pinto eventually     HTN (hypertension).   BP stable; continue lisinopril    HFrEF -  chronic   -TTE with EF of 40-45% for which cardio was consulted  -no evidence of decompensated heart failure, TNI negative and non ischemic EKG changes  -seen by cardiology on 11/26 and recommended outpatient follow up with primary cardiology    Mild Hyponatremia possibly due to polydipsia vs SIADH - resolved  -appears euvolemic  -urine studies reviewed  -continue salt tabs and free water restriction    DAVID likely due to prerenal azotemia - resolved    DVT ppx - Heparin SC    Dispo - inpatient psych once transport arranged.

## 2022-12-08 NOTE — BH CONSULTATION LIAISON PROGRESS NOTE - NSBHFUPINTERVALHXFT_PSY_A_CORE
Patient is a 51 year old male who resides with his wife and 11 y/o son, with a past psychiatric history of schizoaffective disorder depressive type, following with [facility] and with no history of substance abuse and with a PMH of breast CA / Lymphoma and HTN who is admitted to [facility] for treatment of psychosis who sent patient to [facility] for medical work up s/p fall.     Patient seen for follow up , chart reviewed and case discussed with team. Patient agitated last night, code flight called and patient medicated with haldol and then with ativan IM     Patient seen and found to be laying in bed awake and alert but disorganized with significant thought blocking and seen responding to internal stimuli but denying any s/h ideation or AVH     As per 1 to 1, patient has been calm today and seen ambulating in room and to bathroom with no difficulty 
Patient is a 51 year old male who resides with his wife and 11 y/o son, with a past psychiatric history of schizoaffective disorder depressive type, following with [facility] and with no history of substance abuse and with a PMH of breast CA / Lymphoma and HTN who is admitted to [facility] for treatment of psychosis who sent patient to [facility] for medical work up s/p fall.     Patient seen for follow up , chart reviewed and case discussed with team. Patient agitated again last night,  patient medicated with haldol and then with ativan IM     Patient seen and found to be laying in bed awake, alert  and off of restraints. Patient disorganized with significant thought blocking and still with delusions of grandiosity stating he is god and calling writer his "son". patient still denying any s/h ideation or [facility]     staff reports patient has been eating, ambulating to the bathroom, bizarre but not aggressive today     
Patient is a 51 year old male who resides with his wife and 11 y/o son, with a past psychiatric history of schizoaffective disorder depressive type, following with [facility] and with no history of substance abuse and with a PMH of breast CA / Lymphoma and HTN who is admitted to [facility] for treatment of psychosis who sent patient to [facility] for medical work up s/p fall.     Patient seen for follow up , chart reviewed and case discussed with team.   Patient seen laying in bed, still disorganized and religiously preoccupied. Patient calling himself god and stating he "forgives" everyone. Patient till with mood lability requiring IM PRNs but denies any s/h ideation or AVH despite being seen talking to himself 
Patient is a 51 year old male who resides with his wife and 11 y/o son, with a past psychiatric history of schizoaffective disorder depressive type, following with [facility] and with no history of substance abuse and with a PMH of breast CA / Lymphoma and HTN who is admitted to [facility] for treatment of psychosis who sent patient to [facility] for medical work up s/p fall.     Patient seen for follow up , chart reviewed and case discussed with team.   Patient seen sitting up in bed, still disorganized and religiously preoccupied. Patient calling himself god and stating he "forgives" everyone. Patient till with mood lability requiring IM PRNs but denies any s/h ideation or AVH despite being seen talking to himself 
Patient is a 51 year old male who resides with his wife and 11 y/o son, with a past psychiatric history of schizoaffective disorder depressive type, following with [facility] and with no history of substance abuse and with a PMH of breast CA / Lymphoma and HTN who is admitted to [facility] for treatment of psychosis who sent patient to [facility] for medical work up s/p fall.     Patient seen for follow up , chart reviewed and case discussed with team. Patient agitated this morning requiring IM Ativan     Patient seen and found to be laying in bed sedated s/p PRN 
Patient is a 51 year old male who resides with his wife and 13 y/o son, with a past psychiatric history of schizoaffective disorder depressive type, following with [facility] and with no history of substance abuse and with a PMH of breast CA / Lymphoma and HTN who is admitted to [facility] for treatment of psychosis who sent patient to [facility] for medical work up s/p fall.     Patient seen for follow up , chart reviewed and case discussed with team. Patient agitated again last night,  patient medicated with haldol and then with ativan IM     Patient seen and found to be laying in bed awake, alert with bilateral wrist restraints in place. Patient disorganized, grandiose, stating he is god and calling the staff around him his children but then becoming labile and paranoid at times stating people are "disrespecting and against" him and still denying any s/h ideation or AVH.     As per 1 to 1, patient has been calm today and seen ambulating in room and to bathroom with no difficulty 
Patient is a 51 year old male who resides with his wife and 11 y/o son, with a past psychiatric history of schizoaffective disorder depressive type, following with [facility] and with no history of substance abuse and with a PMH of breast CA / Lymphoma and HTN who is admitted to [facility] for treatment of psychosis who sent patient to [facility] for medical work up s/p fall.     Patient seen for follow up , chart reviewed and case discussed with team. Patient agitated again last night and required multiple PRN meds including IV Valium and not transferred to 3 T     Patient seen and found to be laying in bed awake, alert  and off of restraints. Patient disorganized and with a euphoric affect stating he is "fantanstic" and again talking of being both sloan and god and denies any s/h ideation or AVH despite being seen responding to int stimuli

## 2022-12-08 NOTE — BH CONSULTATION LIAISON PROGRESS NOTE - NSBHPTASSESSDT_PSY_A_CORE
02-Dec-2022 17:39
08-Dec-2022 18:27
05-Dec-2022 18:09
30-Nov-2022 12:48
01-Dec-2022 18:22
07-Dec-2022 15:04
29-Nov-2022 15:25

## 2022-12-08 NOTE — BH CONSULTATION LIAISON PROGRESS NOTE - NSBHMSESPEECH_PSY_A_CORE
Abnormal as indicated, otherwise normal...

## 2022-12-08 NOTE — PROGRESS NOTE ADULT - REASON FOR ADMISSION
Disorganized behaviour / confusion / fall

## 2022-12-08 NOTE — DISCHARGE NOTE NURSING/CASE MANAGEMENT/SOCIAL WORK - PATIENT PORTAL LINK FT
You can access the FollowMyHealth Patient Portal offered by [facility] by registering at the following website: [email or web address]. By joining [employer]’s FollowMyHealth portal, you will also be able to view your health information using other applications (apps) compatible with our system.

## 2022-12-08 NOTE — PROGRESS NOTE ADULT - PROVIDER SPECIALTY LIST ADULT
Hospitalist
Internal Medicine
Hospitalist

## 2022-12-08 NOTE — BH CONSULTATION LIAISON PROGRESS NOTE - NSBHMSESPABN_PSY_A_CORE
Pressured rate

## 2022-12-08 NOTE — BH CONSULTATION LIAISON PROGRESS NOTE - NSBHATTESTBILLINGAW_PSY_A_CORE
Billing in another system

## 2022-12-08 NOTE — BH CONSULTATION LIAISON PROGRESS NOTE - NSBHFUPREASONCONS_PSY_A_CORE
Looks like just an ultrasound scheduled  I can see Monday am  
psychosis

## 2022-12-09 PROBLEM — Z00.00 ENCOUNTER FOR PREVENTIVE HEALTH EXAMINATION: Status: ACTIVE | Noted: 2022-12-09

## 2023-01-02 ENCOUNTER — EMERGENCY (EMERGENCY)
Facility: HOSPITAL | Age: 53
LOS: 1 days | Discharge: HOME | End: 2023-01-02
Attending: EMERGENCY MEDICINE
Payer: COMMERCIAL

## 2023-01-02 VITALS
OXYGEN SATURATION: 100 % | SYSTOLIC BLOOD PRESSURE: 135 MMHG | RESPIRATION RATE: 18 BRPM | TEMPERATURE: 98 F | HEART RATE: 76 BPM | DIASTOLIC BLOOD PRESSURE: 80 MMHG

## 2023-01-02 VITALS
RESPIRATION RATE: 20 BRPM | SYSTOLIC BLOOD PRESSURE: 127 MMHG | DIASTOLIC BLOOD PRESSURE: 83 MMHG | OXYGEN SATURATION: 98 % | TEMPERATURE: 97 F | WEIGHT: 175.05 LBS | HEART RATE: 88 BPM

## 2023-01-02 PROCEDURE — 99284 EMERGENCY DEPT VISIT MOD MDM: CPT

## 2023-01-02 PROCEDURE — 93971 EXTREMITY STUDY: CPT | Mod: 26,RT

## 2023-01-02 PROCEDURE — 93971 EXTREMITY STUDY: CPT

## 2023-01-02 NOTE — ED ADULT NURSE NOTE - OBJECTIVE STATEMENT
Pt 51 y/o presents to ED From [facility] complaining of Left arm swelling. Pt poor historian, laughing in response to assessment questions. Unable to obtain history at tis time. Radial Pulses+2 bilaterally, warm to touch. cap refill less than 2 seconds. In no acute distress. [facility] aid present at bedside.

## 2023-01-02 NOTE — ED PROVIDER NOTE - NSFOLLOWUPINSTRUCTIONS_ED_ALL_ED_FT
- Follow up with your doctor within 2-3 days.   - Bring results with you to the appointment.   - Take Motrin (Ibuprofen/Advil) 600mg every 6 hours as needed.  - Keep arm elevated.   - Return to the ED for any new or worsening symptoms.

## 2023-01-02 NOTE — ED PROVIDER NOTE - NSICDXFAMILYHX_GEN_ALL_CORE_FT
FAMILY HISTORY:  Patient unable to provide medical history, pt. not give goood hsitory very likely due to his current disorganized behaviour.

## 2023-01-02 NOTE — ED ADULT NURSE NOTE - NS ED PATIENT SAFETY CONCERN
-BP generally well controlled.  -Cont nifedipine and monitor BP     Unable to assess due to medical condition

## 2023-01-02 NOTE — ED ADULT TRIAGE NOTE - CHIEF COMPLAINT QUOTE
Patient presents to ED from [facility] for evaluation of right arm swelling, denies recent injuries, denies pain, no fever, resp even/unlabored.

## 2023-01-02 NOTE — ED PROVIDER NOTE - PHYSICAL EXAMINATION
Const: Awake, alert and oriented to self. In no acute distress. Well appearing.  HEENT: NC/AT. Moist mucous membranes.  Eyes: No scleral icterus. EOMI.  Neck:. Soft and supple. Full ROM without pain.  Cardiac: Regular rate and regular rhythm. +S1/S2. No murmurs. Peripheral pulses 2+ and symmetric. No LE edema.  Resp: Speaking in full sentences. No evidence of respiratory distress. No wheezes, rales or rhonchi.  Abd: Soft, non-tender, non-distended. Normal bowel sounds in all 4 quadrants. No guarding or rebound.  Back: Spine midline and non-tender. No CVAT.  Extremities: 2+ pitting edema of the right upper arm from elbow to hand. Full ROM, 2+ symmetrical radial pulses, 5/5  strength, sensation intact, no discoloration.  Skin: No rashes, abrasions or lacerations.  Neuro: Awake, alert & oriented x 1. Moves all extremities symmetrically.

## 2023-01-02 NOTE — ED PROVIDER NOTE - NSICDXPASTMEDICALHX_GEN_ALL_CORE_FT
PAST MEDICAL HISTORY:  Breast cancer     Lymphoma     Major depression schizoaffective disorder ?

## 2023-01-02 NOTE — ED PROVIDER NOTE - PATIENT PORTAL LINK FT
You can access the FollowMyHealth Patient Portal offered by [facility] by registering at the following website: [email or web address]. By joining [email or web address]’s FollowMyHealth portal, you will also be able to view your health information using other applications (apps) compatible with our system.

## 2023-01-02 NOTE — ED ADULT NURSE NOTE - CHIEF COMPLAINT QUOTE
Patient presents to ED from [facility] for evaluation of right arm swelling, denies recent injuries, denies pain, no fever, resp even/unlabored.

## 2023-01-03 PROBLEM — F32.9 MAJOR DEPRESSIVE DISORDER, SINGLE EPISODE, UNSPECIFIED: Chronic | Status: ACTIVE | Noted: 2022-08-14

## 2023-05-10 NOTE — ED PROVIDER NOTE - CLINICAL SUMMARY MEDICAL DECISION MAKING FREE TEXT BOX
53 y/o M with PMH right breast Ca presents for right UE edema over unknown time period from [facility]. No complaints, neuro-vascularly intact. Will check doppler and reassess. [FreeTextEntry1] : Mata is an 7 year old boy  with ADHD, predominantly hyperactive with sleep difficulties, and is here a follow up for medication management.  He is on a complex  medication regimen of Focalin LA and IR along with guanfacine IR and ER and requires multiple daILy dosing as he is a rapid metabolizer.  \par Recommended trial of melatonin at bedtime for a few days. If no improvement will try clonidine LA at bedtime. \par \par PLAN FROM PREVIOUS VISIT\par -Continue current medication regimen for ADHD;\par   7:30 AM- Focalin XL 40 mg\par   12:00 pm Focalin IR 10 mg\par                Guanfacine IR  1 mg\par   4:00 pm  Focalin IR 10 mg \par                Guanfacine IR 1 mg\par   7:30 pm Guanfacine ER 1 mg\par -Trial of melatonin 2 mg at bedtime for 3-7 days for sleep difficulties\par -If not improvement in sleep with send Rx of long acting clonidine (may require a PA)- short acting clonidine was    effective but did not help with AM symptoms which were severe (running out of the house, etc).\par \par INTERIM VISIT:\par \par Trial of melatonin at bedtime was not effective, PA for clonidine LA at bedtime was approved but not until spring break was over and mother wants to wait until he has a long weekend.  \par The guanfacine IR during the day wasn’t working as well as the clonidine, so switch back to clonidine and giving it at 12 pm and 6 pm, then takes guanfacine ER 1 mg at bedtime.  This regimen seems to help his hyperactivity, settles him down, and helps him sleep, and he wakes up in better mood.  He is doing very well in school, teachers reporting improved behavior and he was asked to give a science presentation.   \par She would like to simplify the regimen but worries about making changes that won't be effective and he'll get into trouble. She is planning to add the clonidine ER over Memorial Day weekend. \par Mother bought a BP machine and takes his blood pressure at night which has been stable.  He has not lost weight, mother reports he has gained a few pounds. \par He has strep throat X 2 last month. \par \par His current regimen is:\par 7:30 AM- Focalin XL 40 mg\par   12:00 pm Focalin IR 10 mg\par                 clonidine 0.1 mg\par   4:00 pm  Focalin IR 10 mg \par    6:00 pm clonidine 0.1 mg\par   8:30 pm Guanfacine ER 1 mg\par \par \par \par FROM 03/27/23 VISIT\par isacc August is an 7 year old boy here for an initial consultation in the Division of Pediatric Neurology for ADHD management.  He has an ADHD diagnosis and his medications has been managed in the past by PCP at [facility].\par \par Child was born full term and healthy, no complications.  He lives with his biological mother and is the product of donor sperm and the h/o donor sperm is unknown.\par He was diagnosed with ADHD and anxiety at 4 1/2 years old by neurologist Krista Correia MD.  He was having  severe disruptive and inattentive problems in pre-K and at home and was started on methylphenidate by PMD. School based services were put in place and mother had parent management training. Mother has since eliminated food coloring and most exogenous sugar. He took Omega3 fish oil for several years. He also started zinc, elderberry, B6, magnesium which he still takes. \par Since that time he has had persistent behavioral challenges at school, at home, on the bus and with peers during team sports and has been on multiple ADHD medications (listed below).  His behavior does improve on the medication but he has required numerous adjustments related to early wear off and sleep problems.  \par At the age of 5 year old he was evaluated by [facility] pediatric psychiatrist Dr Armando Stapleton due to the ongoing challenges while on Focalin LA in the AM plus Focalin IR in the afternoon and clonidine. He recommended to continue current management.\par At 6 year old has f/u with  Dr Stapleton as  he was having more behavior challenges at school, decreased attentiveness and lack of engagement in his work. At home he was persistently hyperactive, often yelling, interrupting, responding poorly to redirection. At this time his am dose of Focalin was 35 mg, focalin IR 7.5 -10 mg in the afternoon, and clonidine 0.1 mg at bedtime.  \par In September 2022 he was taking clonidine 0.1 mg 4 times a day, Focalin XR i the AM, Focalin SA in the afternoon.\yoly August requires the AM long acting formulation of Focalin to be name-brand as the generic formulation was substituted once and had no effect at all on his behavior.  A PA was obtained. \par He had was evaluated by Peds Cardiology 11/10/22 to rule out cardiac issues related to medications and was cleared to continue meds.  \par At 7 years old he followed up with [facility] child psychiatrist Dr Jacquelyn Martinez.  A later afternoon dose of Focalin had been added. At that time he was doing ok in school, the most difficult time was the morning when he as extraordinarily rude, reactive, cursing at times.  He was started on guanfacine ER (extended release) 0.1 mg  instead of clonidine IR at bedtime and wanted to consider weaning the am clonidine.  Other option suggested was  returning to Concerta at some point as had worked in the past.\par The guanfacine ER at bedtime helped his mornings but was having more difficulty falling asleep since stopping the bedtime clonidine. The guanfacine ER only lasted until early afternoon (lasts about 16 hours).. Focalin XR was increased to 40 mg in the am.\par Denies medication side effects except that since clonidine was switched to guanfacine LA at bedtime he is having difficulty with sleep initiation. When on short acting clonidine it did not help with morning hyperactivity issue at all, and now his behavior is better in the morning. He has been gaining weight and no c/o dizziness, BP has been normal.  No history of  safety concerns, harvinder or psychosis. No reports of trauma. \par \par CURRENT DOSING REGIMEN:\par 7:30 AM- Focalin XL 40 mg\par 12:00 pm Focalin IR 10 mg\par                Guanfacine IR  1 mg\par 4:00 pm  Focalin IR 10 mg \par                Guanfacine IR 1 mg\par 7:30 pm Guanfacine ER 1 mg-\par \par He is doing better at school during the day but having difficulty falling asleep.  The guanfacine IR was changed from 11 am to 12 pm as was having behavior problems on the bus due to early wear off. He is still getting in trouble on the bus for yelling sometimes but not as bad.\par He is doing well academically; rated letter M for reading (M is above average in reading)\par He is sloppy in writing, spelling is being address; school will be getting him evaluated as he makes spelling mistakes frequently.  \par I reviewed case with Dr Martinez and discussed options 03/17/23 (prior to this visit)\par option 1:\par Decrease 4 pm dose of Focalin IR from 10 mg to 5 mg to help with sleep\par Mother tried but he was very hyperactive and made no difference on his sleep. \par option 2:\par Increase 8:30 pm dose of guanfacine ER to 2 mg\par Mother doesn't think he needs it and he is so drowsy in the AM she feels it would be too much. Mother asked if the earlier IR dose could be increased but this exceeds the max recommended dose of 0.1 mg for the IR formulation.\par option 3:\par Give Focalin LA 2 times a day and no IR dose of Focalin (given Focalin LA 40 mg at 7:30 am and 20 mg at 12 pm.  Mother feels it will wear off too early in the afternoon, the 40 mg dose only lasts about 4 1/2 hours.\par \par \par \par \par \par \par \par \par \par \par Inattention:\par \par Hyperactivity:\par \par Impulsivity:\par \par \par \par \par BIRTH HISTORY\par \par DEVELOPMENTAL HISTORY\par \par EDUCATIONAL HISTORY\par \par PRIOR  SCHOOL\par \par CURRENT\par -School: \par -Academic performance/grades: \par - developmental/Educational services:\par - School: General education\par -Special Ed services; none\par -Classification such as LD, other health impairment: none\par -Therapy such as OT/PT/SL \par -Other accommodations or services \par \par \par HOME \par Lives with \par Homework:\par Home behavior: \par \par RELATIONSHIPS:\par \par EMOTIONAL\par \par SLEEP\par \par EATING\par \par ACTIVITIES/INTERESTS:\par \par OTHER CONCERNS:\par \par MEDICAL HISTORY: \par Denies a history of TICS\par Denies history of starting spells, twitching, seizure-like activity.\par \par FAMILY HISTORY: \par Family history of ADHD:\par Denies family history of cardiac conditions or early unexplained deaths?\par \par

## 2023-05-26 NOTE — ED ADULT TRIAGE NOTE - GLASGOW COMA SCALE: EYE OPENING, MLM
[address] [place] [address]  Phone: [phone]      05/26/23    Ravi Stanley  [address] [place] [address] DR ABERNATHY  [facility] [id number]              Dear Ravi:    We, at [facility] want to help you receive better care.  To improve the process of getting refills and following up with your provider we are asking that you schedule an appointment with Dr. Miller before your next refill runs out.  We will lcyde you a one time 90 day javid refill. We will not be able to refill your medication without an appointment after 8/24/23.  You can call the clinic at [phone] to schedule. This can be done as a virtual visit.    Sincerely,    [facility]       Sincerely,      Junior Miller MD             (E4) spontaneous

## 2023-07-11 NOTE — ED STATDOCS - NS ED MD DISPO DISCHARGE CCDA
noted  ----- Message -----  From: Alondra Vazquez, OT  Sent: 7/10/2023   4:49 PM PDT  To: Dania Andrade R.N.; Delores Myles      Missed OT visit on 6/22/23 due to daughter calling to cancel visit stating the patient did not feel well. May or may not be up for follow up OT visits int he future. 
Patient/Caregiver provided printed discharge information.

## 2023-12-15 NOTE — ED ADULT TRIAGE NOTE - NSWEIGHTCALCTOOLDRUG_GEN_A_CORE
Schedule blood test for morning time for your pituitary hormones.  Add one more serving of dairy per day or add calcium 600 mg.  Schedule for bone density  See a dentist for cleaning and routine care    used

## 2024-07-01 ENCOUNTER — NON-APPOINTMENT (OUTPATIENT)
Age: 54
End: 2024-07-01

## 2024-07-01 ENCOUNTER — APPOINTMENT (OUTPATIENT)
Dept: CARDIOLOGY | Facility: CLINIC | Age: 54
End: 2024-07-01
Payer: MEDICAID

## 2024-07-01 VITALS
TEMPERATURE: 97.5 F | HEART RATE: 71 BPM | SYSTOLIC BLOOD PRESSURE: 110 MMHG | WEIGHT: 224 LBS | DIASTOLIC BLOOD PRESSURE: 74 MMHG | HEIGHT: 68 IN | OXYGEN SATURATION: 96 % | BODY MASS INDEX: 33.95 KG/M2

## 2024-07-01 DIAGNOSIS — A04.8 OTHER SPECIFIED BACTERIAL INTESTINAL INFECTIONS: ICD-10-CM

## 2024-07-01 DIAGNOSIS — R07.89 OTHER CHEST PAIN: ICD-10-CM

## 2024-07-01 DIAGNOSIS — C81.90 HODGKIN LYMPHOMA, UNSPECIFIED, UNSPECIFIED SITE: ICD-10-CM

## 2024-07-01 DIAGNOSIS — F31.9 BIPOLAR DISORDER, UNSPECIFIED: ICD-10-CM

## 2024-07-01 DIAGNOSIS — C50.929 MALIGNANT NEOPLASM OF UNSPECIFIED SITE OF UNSPECIFIED MALE BREAST: ICD-10-CM

## 2024-07-01 PROCEDURE — G2211 COMPLEX E/M VISIT ADD ON: CPT | Mod: NC

## 2024-07-01 PROCEDURE — 99214 OFFICE O/P EST MOD 30 MIN: CPT | Mod: 25

## 2024-07-01 PROCEDURE — 93000 ELECTROCARDIOGRAM COMPLETE: CPT

## 2024-07-01 RX ORDER — LITHIUM CARBONATE 600 MG/1
600 CAPSULE ORAL 3 TIMES DAILY
Refills: 0 | Status: ACTIVE | COMMUNITY
Start: 2024-07-01

## 2024-07-01 RX ORDER — UREA 10 %
45 LOTION (ML) TOPICAL
Refills: 0 | Status: ACTIVE | COMMUNITY
Start: 2024-07-01

## 2024-07-01 RX ORDER — GABAPENTIN 100 MG/1
100 CAPSULE ORAL
Refills: 0 | Status: ACTIVE | COMMUNITY
Start: 2024-07-01

## 2024-07-01 RX ORDER — DIVALPROEX SODIUM 500 MG/1
500 TABLET, DELAYED RELEASE ORAL
Refills: 0 | Status: ACTIVE | COMMUNITY
Start: 2024-07-01

## 2024-07-19 ENCOUNTER — APPOINTMENT (OUTPATIENT)
Dept: CARDIOLOGY | Facility: CLINIC | Age: 54
End: 2024-07-19
Payer: MEDICAID

## 2024-07-19 PROCEDURE — 93015 CV STRESS TEST SUPVJ I&R: CPT

## 2024-07-19 PROCEDURE — A9502: CPT

## 2024-07-19 PROCEDURE — 93306 TTE W/DOPPLER COMPLETE: CPT

## 2024-07-19 PROCEDURE — 78452 HT MUSCLE IMAGE SPECT MULT: CPT

## 2024-08-16 ENCOUNTER — APPOINTMENT (OUTPATIENT)
Dept: CARDIOLOGY | Facility: CLINIC | Age: 54
End: 2024-08-16

## 2024-12-17 NOTE — ED ADULT TRIAGE NOTE - TEMPERATURE IN FAHRENHEIT (DEGREES F)
History of Present Illness:  The patient is here for evaluation of upper shoulder/neck pain today.  No recent injuries or illnesses although taking care of mother who was recently put in to home hospice    They are requesting possible Osteopathic Manipulative Treatment today.    Physical Exam:  Vitals:    12/16/24 1309   BP: 132/80   Pulse: 98   Resp: 16     General:  Alert and oriented, in no acute distress  Psychiatric:  Normal Affect and Judgment  Osteopathic Musculoskeletal Exam:   The following areas were identified for the following:  Tissue texture change, Positional Asymmetry, Restriction of motion, and/or tenderness      Region Somatic Dysfunction Severity (0, 1, 2)   Head Right cranial torsion 1   Cervical Bilateral paraspinal spasm worse on right 2   Thoracic Decreased upper thoracic motion, especially on the right 1   Lumbar Tight paraspinals bilaterally 1   Sacral Tenderness over right BLANCA today    Pelvic     Lower Extremities     Upper Extremities Poor sc motion especially on the left 1   Rib Cage Elevated first rib left  Restriction of right lower rib cage 1   Abdomen & Other Sites right diaphragm restriction        ASSESSMENT:   Chronic neck pain with exacerbation: tizanidine refilled  fibromyalgia      Somatic Dysfunction:    cranial somatic dysfunction  cervical spine somatic dysfunction  thoracic spine somatic dysfunction  lumbar spine somatic dysfunction  Sacrum somatic dysfunction  upper extremity somatic dysfunction  rib cage somatic dysfunction        PLAN:  Osteopathic Manipulative Treatment was discussed today with the patient the with goal of improving motion and function of the skeletal, arthrodial, myofascial and visceral structures as well as related vascular, lymphatic and neural elements related to the somatic dysfunction and compensatory changes identified in the osteopathic structural examination.  The patient provided verbal consent for treatment.    Osteopathic Manipulative  Treatment Procedure Note    After verbal consent was obtained the following somatic dysfunction was treated:    cranial somatic dysfunction:  treatment performed:  cranial osteopathy     pain outcome:  improved     range of motion: improved  cervical somatic dysfunction: treatment performed: myofascial release   pain outcome:  improved      range of motion: improved  thoracic spine somatic dysfunction:  treatment performed:    G5    pain outcome:  improved      range of motion: improved  lumbar spine somatic dysfunction:  treatment performed:   G5    pain outcome:  improved      range of motion: improved  sacrum somatic dysfunction:  treatment performed:   G5     pain outcome:  improved      range of motion: improved  pelvic somatic dysfunction:  treatment performed:  G5      pain outcome:  improved      range of motion: improved  upper extremity somatic dysfunction:  treatment performed:   myofascial release      pain outcome:  improved      range of motion: improved  rib cage somatic dysfunction:  treatment performed:   G5  pain outcome:  improved      range of motion: improved    The patient was instructed to increase fluids and apply ice to any sore areas over the next 72 hours.  The patient was instructed that some post treatment soreness is not unusual, but if they experience worsening pain or any worsening numbness, tingling, or any bowel or bladder incontinence to contact the clinic or report to the Emergency Department for further evaluation.  The patient verbalized understanding and agreement with the above.     99.1

## 2025-06-04 NOTE — ED ADULT TRIAGE NOTE - HEIGHT IN INCHES
[de-identified] : NAZARIO SOMMER is a 52 y/o M who is s/p Right knee arthroscopy with partial medial meniscectomy on 02/11/2025. Patient states overall he is feeling well. He notes improvement of range of motion and pain, states his strength he feels is lagging behind. Patient has been doing home exercises; he has not been attending physical therapy. Patient notes pain to the inferior patella when bending/kneeling or when going up and down the stairs, otherwise patient can tolerate ambulating on flat ground. He denies any recent injury or trauma. He is not taking any medication for pain at this time. Patient is inquiring today about starting physical therapy. 
[de-identified] : NAZARIO SOMMER is a 52 y/o M who is s/p Right knee arthroscopy with partial medial meniscectomy on 02/11/2025. Patient states overall he is feeling well. He notes improvement of range of motion and pain, states his strength he feels is lagging behind. Patient has been doing home exercises; he has not been attending physical therapy. Patient notes pain to the inferior patella when bending/kneeling or when going up and down the stairs, otherwise patient can tolerate ambulating on flat ground. He denies any recent injury or trauma. He is not taking any medication for pain at this time. Patient is inquiring today about starting physical therapy. 
10

## 2025-06-09 NOTE — ED PROVIDER NOTE - CHIEF COMPLAINT
Patient called requesting refill for Allopurinol 300 mg. Patient made aware medication was refilled on 4/1/25 for 90 with 3 refills to [facility] pharmacy. Patient instructed to contact the pharmacy and speak with someone directly to obtain refills of medication. Patient advised to call back for refill if their pharmacy is unable to assist them. Patient verbalized understanding.     
The patient is a 51y Male complaining of palpitations.

## 2025-08-07 ENCOUNTER — APPOINTMENT (OUTPATIENT)
Dept: GASTROENTEROLOGY | Facility: CLINIC | Age: 55
End: 2025-08-07